# Patient Record
Sex: FEMALE | NOT HISPANIC OR LATINO | Employment: UNEMPLOYED | URBAN - METROPOLITAN AREA
[De-identification: names, ages, dates, MRNs, and addresses within clinical notes are randomized per-mention and may not be internally consistent; named-entity substitution may affect disease eponyms.]

---

## 2018-01-18 NOTE — PROGRESS NOTES
Assessment    1  Injury of upper extremity, left, initial encounter (959 2) (S49 92XA)   2  Fracture, humerus, head (812 09) (S42 293A)    Plan  Fracture, humerus, head    · Hydrocodone-Acetaminophen 7 5-325 MG Oral Tablet; TAKE 1 TABLET 3 times  daily PRN   · *1 - [facility] (ORTHOPEDIC SURGERY ) Physician  Referral  Consult  Status: Active  Requested for: [id number]  Care Summary provided  : Yes  Injury of upper extremity, left, initial encounter    · * XR SHOULDER 2+ VIEW LEFT; Status:Active; Requested for:18Mar2016;     Discussion/Summary  Discussion Summary:   Patient has fracture of head of humerus  Patient was placed in a long-arm splint and a sling  Advised to use Aleve for pain control and hydrocodone as backup  Follow-up with orthopedics on Monday  Information given  If any worsening of the pain or swelling or any numbness tingling or weakness of the hand, advised to go to ER without any delay  Medication Side Effects Reviewed: Possible side effects of new medications were reviewed with the patient/guardian today  Understands and agrees with treatment plan: The treatment plan was reviewed with the patient/guardian  The patient/guardian understands and agrees with the treatment plan      Chief Complaint    1  Arm Pain  Chief Complaint Free Text Note Form: Gale Shin around 3 pm while skying landed on left upper arm iced it and was seen by  not getting better un able to do ROM with arm states her upper lateral side of her arm is hurting  no open areas or ecchymosis noted at present time  History of Present Illness  HPI: Agree with nurse's note  Now patient is complaining of pain and inability to move her left shoulder  Patient complaining of pain localized in left lateral shoulder  Denies any distal numbness tingling or weakness of the hand or elbow  Denies any significant injury to any other part of the body  Denies any head injury or loss of consciousness     Hospital Based Practices Required Assessment:   Pain Assessment   the patient states they have pain  (on a scale of 0 to 10, the patient rates the pain at 9 )   Abuse And Domestic Violence Screen    Yes, the patient is safe at home  The patient states no one is hurting them  Depression And Suicide Screen  No, the patient has not had thoughts of hurting themself  No, the patient has not felt depressed in the past 7 days  Prefered Language is  [place]  Primary Language is  English  Review of Systems  Focused-Female:   Constitutional: No fever, no chills, feels well, no tiredness, no recent weight gain or loss  Social History    · Never smoker   · No alcohol use   · No drug use    Current Meds   1  No Reported Medications Recorded    Allergies    1  No Known Drug Allergies    Vitals  Signs [Data Includes: Current Encounter]   Recorded: [id number] 09:37PM   Temperature: 99 1 F  Heart Rate: 71  Respiration: 18  Systolic: 843  Diastolic: 57  O2 Saturation: 100  Pain Scale: 9    Physical Exam    Constitutional   General appearance: No acute distress, well appearing and well nourished  Musculoskeletal Moderate to severe restriction of activity range of motion in all planes especially lifting arm above the shoulder level  Mild-to-moderate swelling and moderate to severe tenderness to palpation of the lateral deltoid area  Also moderate tenderness to palpation in anterior joint line  Handgrip 5 out of 5  Full range of motion noticed in the wrist and elbow on the left side  Neurovascularly intact distally        Signatures   Electronically signed by : KAYLA Prado ; Mar 23 2016  4:39PM EST                       (Author)

## 2019-12-16 RX ORDER — TOLTERODINE 2 MG/1
2 CAPSULE, EXTENDED RELEASE ORAL
COMMUNITY
Start: 2018-01-08 | End: 2019-12-17 | Stop reason: ALTCHOICE

## 2019-12-16 RX ORDER — HYDROCODONE BITARTRATE AND ACETAMINOPHEN 7.5; 325 MG/1; MG/1
1 TABLET ORAL 3 TIMES DAILY
COMMUNITY
Start: 2016-03-18 | End: 2019-12-17 | Stop reason: ALTCHOICE

## 2019-12-17 ENCOUNTER — OFFICE VISIT (OUTPATIENT)
Dept: FAMILY MEDICINE CLINIC | Facility: CLINIC | Age: 59
End: 2019-12-17
Payer: COMMERCIAL

## 2019-12-17 VITALS
HEIGHT: 61 IN | HEART RATE: 68 BPM | WEIGHT: 147.4 LBS | SYSTOLIC BLOOD PRESSURE: 98 MMHG | DIASTOLIC BLOOD PRESSURE: 62 MMHG | BODY MASS INDEX: 27.83 KG/M2 | OXYGEN SATURATION: 96 % | TEMPERATURE: 97.7 F

## 2019-12-17 DIAGNOSIS — Z12.11 SCREENING FOR COLON CANCER: ICD-10-CM

## 2019-12-17 DIAGNOSIS — Z01.818 PRE-OPERATIVE GENERAL PHYSICAL EXAMINATION: Primary | ICD-10-CM

## 2019-12-17 DIAGNOSIS — H35.372 EPIRETINAL MEMBRANE (ERM) OF LEFT EYE: ICD-10-CM

## 2019-12-17 DIAGNOSIS — Z13.6 SCREENING FOR CARDIOVASCULAR CONDITION: ICD-10-CM

## 2019-12-17 DIAGNOSIS — Z12.11 SCREENING FOR COLORECTAL CANCER: ICD-10-CM

## 2019-12-17 DIAGNOSIS — Z11.4 SCREENING FOR HIV (HUMAN IMMUNODEFICIENCY VIRUS): ICD-10-CM

## 2019-12-17 DIAGNOSIS — Z12.12 SCREENING FOR COLORECTAL CANCER: ICD-10-CM

## 2019-12-17 DIAGNOSIS — Z13.29 SCREENING FOR THYROID DISORDER: ICD-10-CM

## 2019-12-17 PROCEDURE — 99243 OFF/OP CNSLTJ NEW/EST LOW 30: CPT | Performed by: FAMILY MEDICINE

## 2019-12-17 RX ORDER — NEOMYCIN SULFATE, POLYMYXIN B SULFATE AND DEXAMETHASONE 3.5; 10000; 1 MG/ML; [USP'U]/ML; MG/ML
SUSPENSION/ DROPS OPHTHALMIC
Refills: 0 | COMMUNITY
Start: 2019-09-11 | End: 2019-12-17 | Stop reason: ALTCHOICE

## 2019-12-17 NOTE — PROGRESS NOTES
[facility] PRE-OPERATIVE EVALUATION  [facility]  [facility]    NAME: Nadia Mata  AGE: 62 y o  SEX: female  : 1960     DATE: 2019    Family Practice Pre-Operative Evaluation      Chief Complaint: Pre-operative Evaluation     Surgery: Vitrectomy, membrane peeling  Anticipated Date of Surgery: 2020  Referring Provider: Orman Blizzard, MD       History of Present Illness:     Emili Hall is a 62 y o  female who presents to the office today for a preoperative consultation at the request of surgeon, Dr Michelle Tejada, who plans on performing above surgery  Planned anesthesia is local and retrobulbar block  Patient has a bleeding risk of: no recent abnormal bleeding  Patient does not have objections to receiving blood products if needed  Current anti-platelet/anti-coagulation medications that the patient is prescribed includes: none  Assessment of Chronic Conditions:   - no chronic medical conditions     Assessment of Cardiac Risk:  · Denies unstable or severe angina or MI in the last 6 weeks or history of stent placement in the last year   · Denies decompensated heart failure (e g  New onset heart failure, NYHA functional class IV heart failure, or worsening existing heart failure)  · Denies significant arrhythmias such as high grade AV block, symptomatic ventricular arrhythmia, newly recognized ventricular tachycardia, supraventricular tachycardia with resting heart rate >100, or symptomatic bradycardia  · Denies severe heart valve disease including aortic stenosis or symptomatic mitral stenosis     Exercise Capacity:  · Able to walk 4 blocks without symptoms?: Yes  · Able to walk 2 flights without symptoms?: Yes    Prior Anesthesia Reactions: No     Personal history of venous thromboembolic disease?  Yes, over 30 years ago, fractured leg, developed DVT while in cast; treated for approximately 6 months at that time; no issues since    History of steroid use for >2 weeks within last year? No         Review of Systems:     Review of Systems   Constitutional: Negative  HENT: Negative  Eyes: Positive for visual disturbance  Respiratory: Negative  Cardiovascular: Negative  Gastrointestinal: Negative  Genitourinary: Negative  Musculoskeletal: Negative  Skin: Negative  Neurological: Negative  Psychiatric/Behavioral: Negative  Current Problem List:     There is no problem list on file for this patient  Allergies:     No Known Allergies    Current Medications:     No current outpatient medications on file  Past Medical History:       History reviewed  No pertinent past medical history       Past Surgical History:   Procedure Laterality Date    FINGER SURGERY      KNEE ARTHROSCOPY      WISDOM TOOTH EXTRACTION          Family History   Problem Relation Age of Onset    Osteoporosis Mother         Social History     Socioeconomic History    Marital status: /Civil Union     Spouse name: Not on file    Number of children: Not on file    Years of education: Not on file    Highest education level: Not on file   Occupational History    Not on file   Social Needs    Financial resource strain: Not on file    Food insecurity:     Worry: Not on file     Inability: Not on file    Transportation needs:     Medical: Not on file     Non-medical: Not on file   Tobacco Use    Smoking status: Never Smoker    Smokeless tobacco: Never Used   Substance and Sexual Activity    Alcohol use: Yes     Comment: socially    Drug use: Never     Comment: No drug use - As per [employer]     Sexual activity: Not on file   Lifestyle    Physical activity:     Days per week: Not on file     Minutes per session: Not on file    Stress: Not on file   Relationships    Social connections:     Talks on phone: Not on file     Gets together: Not on file     Attends Anabaptism service: Not on file     Active member of club or organization: Not on file     Attends meetings of clubs or organizations: Not on file     Relationship status: Not on file    Intimate partner violence:     Fear of current or ex partner: Not on file     Emotionally abused: Not on file     Physically abused: Not on file     Forced sexual activity: Not on file   Other Topics Concern    Not on file   Social History Narrative    Not on file        Physical Exam:     BP 98/62 (BP Location: Left arm, Patient Position: Sitting, Cuff Size: Adult)   Pulse 68   Temp 97 7 °F (36 5 °C) (Tympanic)   Ht 5' 1 42" (1 56 m)   Wt 66 9 kg (147 lb 6 4 oz)   SpO2 96%   BMI 27 47 kg/m²     Physical Exam   Constitutional: She is oriented to person, place, and time  She appears well-developed and well-nourished  HENT:   Head: Normocephalic and atraumatic  Right Ear: External ear normal    Left Ear: External ear normal    Mouth/Throat: Oropharynx is clear and moist    Eyes: Pupils are equal, round, and reactive to light  Conjunctivae and EOM are normal    Neck: Normal range of motion  Neck supple  No thyromegaly present  No carotid bruits   Cardiovascular: Normal rate, regular rhythm and normal heart sounds  No murmur heard  Pulmonary/Chest: Effort normal and breath sounds normal  No respiratory distress  Abdominal: Soft  Bowel sounds are normal  She exhibits no distension  There is no tenderness  Musculoskeletal: Normal range of motion  Neurological: She is alert and oriented to person, place, and time  No cranial nerve deficit  Skin: Skin is warm  Capillary refill takes less than 2 seconds  Psychiatric: She has a normal mood and affect  Nursing note and vitals reviewed         Data:     Pre-operative work-up    Laboratory Results: n/a   No results found for: WBC, HGB, HCT, PLT, CHOL, TRIG, HDL, LDLDIRECT, ALT, AST, NA, K, CL, CREATININE, BUN, CO2, TSH, PSA, INR, GLUF, HGBA1C, MICROALBUR  EKG: n/a    Chest x-ray: n/a      Previous cardiopulmonary studies within the past year:  · Echocardiogram: no  · Cardiac Catheterization: no  · Stress Test: no  · Pulmonary Function Testing: no      Assessment & Recommendations:     1  Pre-operative general physical examination  CBC and differential    Comprehensive metabolic panel    Lipid panel    TSH, 3rd generation with Free T4 reflex    Vitamin D 25 hydroxy   2  Epiretinal membrane (ERM) of left eye     3  Screening for HIV (human immunodeficiency virus)  Human Immunodeficiency Virus 1/2 Antigen / Antibody ( Fourth Generation) with Reflex Testing   4  Screening for cardiovascular condition  CBC and differential    Comprehensive metabolic panel    Lipid panel    TSH, 3rd generation with Free T4 reflex    Vitamin D 25 hydroxy   5  Screening for thyroid disorder  CBC and differential    Comprehensive metabolic panel    Lipid panel    TSH, 3rd generation with Free T4 reflex    Vitamin D 25 hydroxy   6  Screening for colorectal cancer  Cologuard   7  Screening for colon cancer         Pre-Op Evaluation Assessment  62 y o  female with planned surgery:    Known risk factors for perioperative complications: None  Current medications which may produce withdrawal symptoms if withheld perioperatively: none    Pre-Op Evaluation Plan  1  Further preoperative workup as follows:   - None; no further preoperative work-up is required    2  Medication Management/Recommendations:   - None, continue medication regimen including morning of surgery, with sip of water    3  Prophylaxis for cardiac events with perioperative beta-blockers: not indicated  4  Patient requires further consultation with: None    Clearance  Pt is low medical risk for surgery       Rachel Morton MD  [address] [facility]  [address]  [address] [facility] [address]  Phone#  [phone]  Fax#  [phone]

## 2019-12-17 NOTE — PATIENT INSTRUCTIONS
Good luck with surgery! It was a pleasure meeting you today  I am giving you labs to get done prior to physical   Please schedule physical in 2 months after your surgery  You are due for colon cancer screening  I am giving you an order for the Cologuard  Please check with insurance that it is covered  Have a happy and healthy holiday

## 2020-05-22 ENCOUNTER — TELEPHONE (OUTPATIENT)
Dept: ADMINISTRATIVE | Facility: OTHER | Age: 60
End: 2020-05-22

## 2020-05-26 ENCOUNTER — OFFICE VISIT (OUTPATIENT)
Dept: FAMILY MEDICINE CLINIC | Facility: CLINIC | Age: 60
End: 2020-05-26
Payer: COMMERCIAL

## 2020-05-26 VITALS
BODY MASS INDEX: 26.4 KG/M2 | HEART RATE: 62 BPM | HEIGHT: 63 IN | DIASTOLIC BLOOD PRESSURE: 68 MMHG | WEIGHT: 149 LBS | RESPIRATION RATE: 16 BRPM | SYSTOLIC BLOOD PRESSURE: 102 MMHG | OXYGEN SATURATION: 98 % | TEMPERATURE: 98.2 F

## 2020-05-26 DIAGNOSIS — R79.89 ELEVATED LFTS: ICD-10-CM

## 2020-05-26 DIAGNOSIS — Z11.59 NEED FOR HEPATITIS C SCREENING TEST: ICD-10-CM

## 2020-05-26 DIAGNOSIS — R21 RASH: ICD-10-CM

## 2020-05-26 DIAGNOSIS — Z12.11 SCREENING FOR COLORECTAL CANCER: ICD-10-CM

## 2020-05-26 DIAGNOSIS — D56.1 BETA-THALASSEMIA (HCC): ICD-10-CM

## 2020-05-26 DIAGNOSIS — Z00.00 ANNUAL PHYSICAL EXAM: Primary | ICD-10-CM

## 2020-05-26 DIAGNOSIS — Z12.12 SCREENING FOR COLORECTAL CANCER: ICD-10-CM

## 2020-05-26 DIAGNOSIS — E55.9 VITAMIN D DEFICIENCY: ICD-10-CM

## 2020-05-26 PROCEDURE — 1036F TOBACCO NON-USER: CPT | Performed by: FAMILY MEDICINE

## 2020-05-26 PROCEDURE — 3008F BODY MASS INDEX DOCD: CPT | Performed by: FAMILY MEDICINE

## 2020-05-26 PROCEDURE — 99396 PREV VISIT EST AGE 40-64: CPT | Performed by: FAMILY MEDICINE

## 2020-05-26 PROCEDURE — 99213 OFFICE O/P EST LOW 20 MIN: CPT | Performed by: FAMILY MEDICINE

## 2020-05-26 RX ORDER — ERGOCALCIFEROL 1.25 MG/1
CAPSULE ORAL
Qty: 12 CAPSULE | Refills: 0 | Status: SHIPPED | OUTPATIENT
Start: 2020-05-26 | End: 2022-06-09 | Stop reason: ALTCHOICE

## 2020-07-14 ENCOUNTER — OFFICE VISIT (OUTPATIENT)
Dept: FAMILY MEDICINE CLINIC | Facility: CLINIC | Age: 60
End: 2020-07-14
Payer: COMMERCIAL

## 2020-07-14 VITALS
WEIGHT: 146.7 LBS | BODY MASS INDEX: 25.99 KG/M2 | HEIGHT: 63 IN | TEMPERATURE: 97.5 F | DIASTOLIC BLOOD PRESSURE: 72 MMHG | OXYGEN SATURATION: 98 % | HEART RATE: 58 BPM | SYSTOLIC BLOOD PRESSURE: 108 MMHG

## 2020-07-14 DIAGNOSIS — Z01.818 PRE-OPERATIVE GENERAL PHYSICAL EXAMINATION: ICD-10-CM

## 2020-07-14 DIAGNOSIS — H25.9 SENILE CATARACT OF LEFT EYE, UNSPECIFIED AGE-RELATED CATARACT TYPE: Primary | ICD-10-CM

## 2020-07-14 PROCEDURE — 99213 OFFICE O/P EST LOW 20 MIN: CPT | Performed by: FAMILY MEDICINE

## 2020-07-14 PROCEDURE — 1036F TOBACCO NON-USER: CPT | Performed by: FAMILY MEDICINE

## 2020-07-14 PROCEDURE — 3008F BODY MASS INDEX DOCD: CPT | Performed by: FAMILY MEDICINE

## 2020-07-14 NOTE — PROGRESS NOTES
[facility] PRACTICE PRE-OPERATIVE EVALUATION  [facility] - [facility]    NAME: Nadia Mata  AGE: 62 y o  SEX: female  : 1960     DATE: 2020    Family Practice Pre-Operative Evaluation      Chief Complaint: Pre-operative Evaluation     Surgery: cataract surgery left eye  Anticipated Date of Surgery: 2020  Referring Provider: Manoj Pickens MD       History of Present Illness:     Lisbet Damon is a 62 y o  female who presents to the office today for a preoperative consultation at the request of surgeon, Dr Kristy Hamilton, who plans on performing cataract syrgery on 2020   Planned anesthesia is local and IV sedation  Patient has a bleeding risk of: no recent abnormal bleeding and no remote history of abnormal bleeding  Patient does not have objections to receiving blood products if needed  Current anti-platelet/anti-coagulation medications that the patient is prescribed includes: none  Assessment of Chronic Conditions:   - no significant medical history     Assessment of Cardiac Risk:  · Denies unstable or severe angina or MI in the last 6 weeks or history of stent placement in the last year   · Denies decompensated heart failure (e g  New onset heart failure, NYHA functional class IV heart failure, or worsening existing heart failure)  · Denies significant arrhythmias such as high grade AV block, symptomatic ventricular arrhythmia, newly recognized ventricular tachycardia, supraventricular tachycardia with resting heart rate >100, or symptomatic bradycardia  · Denies severe heart valve disease including aortic stenosis or symptomatic mitral stenosis     Exercise Capacity:  · Able to walk 4 blocks without symptoms?: Yes  · Able to walk 2 flights without symptoms?: Yes    Prior Anesthesia Reactions: No     Personal history of venous thromboembolic disease?  Yes, DVT approx 30 years ago while leg casted, anticoagulated for 6 months; no issues since then    History of steroid use for >2 weeks within last year? No         Review of Systems:     Review of Systems   Constitutional: Negative  HENT: Negative  Eyes: Positive for visual disturbance  Respiratory: Negative  Cardiovascular: Negative  Gastrointestinal: Negative  Genitourinary: Negative  Musculoskeletal: Negative  Neurological: Negative  Psychiatric/Behavioral: Negative  Current Problem List:     Patient Active Problem List   Diagnosis    Beta-thalassemia ([facility] [address] )       Allergies:     No Known Allergies    Current Medications:       Current Outpatient Medications:     ergocalciferol (VITAMIN D2) 50,000 units, Take 1 tablet once or twice weekly until finished, Disp: 12 capsule, Rfl: 0    Past Medical History:       History reviewed  No pertinent past medical history       Past Surgical History:   Procedure Laterality Date    EYE SURGERY      FINGER SURGERY      KNEE ARTHROSCOPY      WISDOM TOOTH EXTRACTION          Family History   Problem Relation Age of Onset    Osteoporosis Mother         Social History     Socioeconomic History    Marital status: /Civil Union     Spouse name: Marie Torres Number of children: 2    Years of education: masters    Highest education level: Master's degree (e g , MA, MS, Reed, MEd, MSW, NATHALY)   Occupational History    Occupation:      Employer: [employer] resource strain: Not hard at all   [address] insecurity:     Worry: Never true     Inability: Never true    Transportation needs:     Medical: No     Non-medical: No   Tobacco Use    Smoking status: Never Smoker    Smokeless tobacco: Never Used   Substance and Sexual Activity    Alcohol use: Yes     Frequency: Monthly or less     Comment: socially    Drug use: Never     Comment: No drug use - As per [employer]     Sexual activity: Yes     Partners: Male   Lifestyle    Physical activity:     Days per week: 4 days     Minutes per session: Not on file    Stress: Not at all   Relationships    Social connections:     Talks on phone: Not on file     Gets together: Not on file     Attends Sikhism service: Never     Active member of club or organization: Yes     Attends meetings of clubs or organizations: Not on file     Relationship status:     Intimate partner violence:     Fear of current or ex partner: No     Emotionally abused: No     Physically abused: No     Forced sexual activity: No   Other Topics Concern    Not on file   Social History Narrative    Not on file        Physical Exam:     /72   Pulse 58   Temp 97 5 °F (36 4 °C) (Tympanic)   Ht 5' 2 76" (1 594 m)   Wt 66 5 kg (146 lb 11 2 oz)   SpO2 98%   BMI 26 19 kg/m²     Physical Exam   Constitutional: She is oriented to person, place, and time  She appears well-developed and well-nourished  HENT:   Head: Normocephalic and atraumatic  Right Ear: External ear normal    Left Ear: External ear normal    Mouth/Throat: Oropharynx is clear and moist    Eyes: Pupils are equal, round, and reactive to light  Conjunctivae and EOM are normal    Neck: Normal range of motion  Neck supple  No thyromegaly present  No carotid bruits   Cardiovascular: Normal rate, regular rhythm and normal heart sounds  No murmur heard  Pulmonary/Chest: Effort normal and breath sounds normal  No respiratory distress  Abdominal: Soft  Bowel sounds are normal  She exhibits no distension  There is no tenderness  Musculoskeletal: Normal range of motion  Neurological: She is alert and oriented to person, place, and time  No cranial nerve deficit  Skin: Skin is warm  Capillary refill takes less than 2 seconds  Psychiatric: She has a normal mood and affect  Nursing note and vitals reviewed         Data:     Pre-operative work-up    Laboratory Results: I have personally reviewed the pertinent laboratory results/reports      EKG: N/A    Chest x-ray: N/A      Previous cardiopulmonary studies within the past year:  · Echocardiogram: N/A  · Cardiac Catheterization: N/A  · Stress Test: N/A  · Pulmonary Function Testing: N/A      Assessment & Recommendations:     1  Senile cataract of left eye, unspecified age-related cataract type     2  Pre-operative general physical examination         Pre-Op Evaluation Assessment  62 y o  female with planned surgery:    Known risk factors for perioperative complications: None  Current medications which may produce withdrawal symptoms if withheld perioperatively: none  Pre-Op Evaluation Plan  1  Further preoperative workup as follows:   - None; no further preoperative work-up is required    2  Medication Management/Recommendations:   - Not applicable, not on any medications    3  Prophylaxis for cardiac events with perioperative beta-blockers: not indicated or as per surgical team    4   Patient requires further consultation with: None    Clearance  Pt is low risk for surgery        Adrianne Quinn MD  [facility]  [address] [place] [address]  Phone#  [phone]  Fax#  [phone]

## 2020-09-26 ENCOUNTER — PREP FOR PROCEDURE (OUTPATIENT)
Dept: GASTROENTEROLOGY | Facility: MEDICAL CENTER | Age: 60
End: 2020-09-26

## 2020-09-26 DIAGNOSIS — Z12.11 COLON CANCER SCREENING: Primary | ICD-10-CM

## 2020-11-06 ENCOUNTER — TELEPHONE (OUTPATIENT)
Dept: GASTROENTEROLOGY | Facility: CLINIC | Age: 60
End: 2020-11-06

## 2020-11-10 ENCOUNTER — ANESTHESIA EVENT (OUTPATIENT)
Dept: GASTROENTEROLOGY | Facility: MEDICAL CENTER | Age: 60
End: 2020-11-10

## 2020-11-11 ENCOUNTER — HOSPITAL ENCOUNTER (OUTPATIENT)
Dept: GASTROENTEROLOGY | Facility: MEDICAL CENTER | Age: 60
Setting detail: OUTPATIENT SURGERY
Discharge: HOME | End: 2020-11-11
Attending: INTERNAL MEDICINE
Payer: COMMERCIAL

## 2020-11-11 ENCOUNTER — ANESTHESIA (OUTPATIENT)
Dept: GASTROENTEROLOGY | Facility: MEDICAL CENTER | Age: 60
End: 2020-11-11

## 2020-11-11 VITALS
HEART RATE: 57 BPM | RESPIRATION RATE: 16 BRPM | TEMPERATURE: 97.5 F | OXYGEN SATURATION: 97 % | DIASTOLIC BLOOD PRESSURE: 52 MMHG | SYSTOLIC BLOOD PRESSURE: 84 MMHG

## 2020-11-11 VITALS — HEART RATE: 59 BPM

## 2020-11-11 DIAGNOSIS — Z12.11 COLON CANCER SCREENING: ICD-10-CM

## 2020-11-11 PROCEDURE — G0121 COLON CA SCRN NOT HI RSK IND: HCPCS | Performed by: INTERNAL MEDICINE

## 2020-11-11 RX ORDER — SODIUM CHLORIDE 9 MG/ML
125 INJECTION, SOLUTION INTRAVENOUS CONTINUOUS
Status: DISCONTINUED | OUTPATIENT
Start: 2020-11-11 | End: 2020-11-15 | Stop reason: HOSPADM

## 2020-11-11 RX ORDER — PROPOFOL 10 MG/ML
INJECTION, EMULSION INTRAVENOUS AS NEEDED
Status: DISCONTINUED | OUTPATIENT
Start: 2020-11-11 | End: 2020-11-11

## 2020-11-11 RX ADMIN — SODIUM CHLORIDE: 0.9 INJECTION, SOLUTION INTRAVENOUS at 08:18

## 2020-11-11 RX ADMIN — PROPOFOL 50 MG: 10 INJECTION, EMULSION INTRAVENOUS at 08:37

## 2020-11-11 RX ADMIN — PROPOFOL 100 MG: 10 INJECTION, EMULSION INTRAVENOUS at 08:33

## 2020-11-11 RX ADMIN — PROPOFOL 50 MG: 10 INJECTION, EMULSION INTRAVENOUS at 08:42

## 2021-06-01 ENCOUNTER — OFFICE VISIT (OUTPATIENT)
Dept: FAMILY MEDICINE CLINIC | Facility: CLINIC | Age: 61
End: 2021-06-01
Payer: COMMERCIAL

## 2021-06-01 VITALS
HEART RATE: 60 BPM | BODY MASS INDEX: 25.56 KG/M2 | DIASTOLIC BLOOD PRESSURE: 60 MMHG | HEIGHT: 62 IN | TEMPERATURE: 97.6 F | SYSTOLIC BLOOD PRESSURE: 90 MMHG | WEIGHT: 138.9 LBS | OXYGEN SATURATION: 98 %

## 2021-06-01 DIAGNOSIS — Z13.29 SCREENING FOR THYROID DISORDER: ICD-10-CM

## 2021-06-01 DIAGNOSIS — E55.9 VITAMIN D DEFICIENCY: ICD-10-CM

## 2021-06-01 DIAGNOSIS — R79.89 ELEVATED LFTS: ICD-10-CM

## 2021-06-01 DIAGNOSIS — H43.10 DIABETIC VITREOUS HEMORRHAGE (HCC): ICD-10-CM

## 2021-06-01 DIAGNOSIS — Z00.00 ANNUAL PHYSICAL EXAM: Primary | ICD-10-CM

## 2021-06-01 DIAGNOSIS — Z12.31 ENCOUNTER FOR SCREENING MAMMOGRAM FOR MALIGNANT NEOPLASM OF BREAST: ICD-10-CM

## 2021-06-01 DIAGNOSIS — E11.39 DIABETIC VITREOUS HEMORRHAGE (HCC): ICD-10-CM

## 2021-06-01 DIAGNOSIS — Z23 ENCOUNTER FOR IMMUNIZATION: ICD-10-CM

## 2021-06-01 DIAGNOSIS — Z13.6 SCREENING FOR CARDIOVASCULAR CONDITION: ICD-10-CM

## 2021-06-01 DIAGNOSIS — D56.1 BETA-THALASSEMIA (HCC): ICD-10-CM

## 2021-06-01 DIAGNOSIS — M79.645 PAIN OF LEFT THUMB: ICD-10-CM

## 2021-06-01 PROBLEM — H25.13 AGE-RELATED NUCLEAR CATARACT OF BOTH EYES: Status: ACTIVE | Noted: 2019-09-11

## 2021-06-01 PROBLEM — H43.812 VITREOUS DETACHMENT OF LEFT EYE: Status: ACTIVE | Noted: 2019-09-11

## 2021-06-01 PROBLEM — H04.123 DRY EYES: Status: ACTIVE | Noted: 2020-07-13

## 2021-06-01 PROBLEM — H43.392 VITREOUS OPACITIES OF LEFT EYE: Status: ACTIVE | Noted: 2021-03-05

## 2021-06-01 PROBLEM — H16.229 KERATOCONJUNCTIVITIS SICCA NOT DUE TO SJOGREN'S SYNDROME: Status: ACTIVE | Noted: 2020-07-13

## 2021-06-01 PROBLEM — H26.40 SECONDARY CATARACT OF LEFT EYE: Status: ACTIVE | Noted: 2021-03-05

## 2021-06-01 PROBLEM — H33.019: Status: ACTIVE | Noted: 2019-09-11

## 2021-06-01 PROBLEM — H33.309 TEAR OF RETINA WITHOUT DETACHMENT: Status: ACTIVE | Noted: 2020-07-13

## 2021-06-01 PROBLEM — H33.312 HORSESHOE TEAR OF RETINA OF LEFT EYE WITHOUT DETACHMENT: Status: ACTIVE | Noted: 2020-05-27

## 2021-06-01 PROCEDURE — 1036F TOBACCO NON-USER: CPT | Performed by: FAMILY MEDICINE

## 2021-06-01 PROCEDURE — 3008F BODY MASS INDEX DOCD: CPT | Performed by: FAMILY MEDICINE

## 2021-06-01 PROCEDURE — 90715 TDAP VACCINE 7 YRS/> IM: CPT

## 2021-06-01 PROCEDURE — 99396 PREV VISIT EST AGE 40-64: CPT | Performed by: FAMILY MEDICINE

## 2021-06-01 PROCEDURE — 3725F SCREEN DEPRESSION PERFORMED: CPT | Performed by: FAMILY MEDICINE

## 2021-06-01 PROCEDURE — 90471 IMMUNIZATION ADMIN: CPT

## 2021-06-01 RX ORDER — DIPHENHYDRAMINE HCL 25 MG
1 CAPSULE ORAL
COMMUNITY

## 2021-06-01 RX ORDER — CLOBETASOL PROPIONATE 0.46 MG/ML
SOLUTION TOPICAL
COMMUNITY

## 2021-06-01 NOTE — PATIENT INSTRUCTIONS
Please get your labwork done fasting  Do not eat/drink for about 8-12 hours prior to getting the labwork done, however you may drink water or black coffee while you are fasting  Please use a [facility] lab if possible, as we receive the lab results more quickly  We will notify you of your labwork results even if they are normal   Please contact us if you do not hear about your lab results after one week  Low Carb Recommendations:  Please follow a low carbohydrate, high protein diet  [employer] is a good mick/computer program to keep food logs  Your meals should be less than 30 grams of carbohydrates  Your snacks should be less than 15 grams of carbohydrates  You should drink at least 64 ounces of water daily  You should walk at least 30 minutes daily  I am going to get records from dermatology to review  Please call for any questions or concerns  Yearly well exams are recommended  Wellness Visit for Adults   AMBULATORY CARE:   A wellness visit  is when you see your healthcare provider to get screened for health problems  Your healthcare provider will also give you advice on how to stay healthy  Write down your questions so you remember to ask them  Ask your healthcare provider how often you should have a wellness visit  What happens at a wellness visit:  Your healthcare provider will ask about your health, and your family history of health problems  This includes high blood pressure, heart disease, and cancer  He or she will ask if you have symptoms that concern you, if you smoke, and about your mood  You may also be asked about your intake of medicines, supplements, food, and alcohol  Any of the following may be done:  · Your weight  will be checked  Your height may also be checked so your body mass index (BMI) can be calculated  Your BMI shows if you are at a healthy weight  · Your blood pressure  and heart rate will be checked  Your temperature may also be checked      · Blood and urine tests  may be done  Blood tests may be done to check your cholesterol levels  Abnormal cholesterol levels increase your risk for heart disease and stroke  You may also need a blood or urine test to check for diabetes if you are at increased risk  Urine tests may be done to look for signs of an infection or kidney disease  · A physical exam  includes checking your heartbeat and lungs with a stethoscope  Your healthcare provider may also check your skin to look for sun damage  · Screening tests  may be recommended  A screening test is done to check for diseases that may not cause symptoms  The screening tests you may need depend on your age, gender, family history, and lifestyle habits  For example, colorectal screening may be recommended if you are 48years old or older  Screening tests you need if you are a woman:   · A Pap smear  is used to screen for cervical cancer  Pap smears are usually done every 3 to 5 years depending on your age  You may need them more often if you have had abnormal Pap smear test results in the past  Ask your healthcare provider how often you should have a Pap smear  · A mammogram  is an x-ray of your breasts to screen for breast cancer  Experts recommend mammograms every 2 years starting at age 48 years  You may need a mammogram at age 52 years or younger if you have an increased risk for breast cancer  Talk to your healthcare provider about when you should start having mammograms and how often you need them  Vaccines you may need:   · Get an influenza vaccine  every year  The influenza vaccine protects you from the flu  Several types of viruses cause the flu  The viruses change over time, so new vaccines are made each year  · Get a tetanus-diphtheria (Td) booster vaccine  every 10 years  This vaccine protects you against tetanus and diphtheria  Tetanus is a severe infection that may cause painful muscle spasms and lockjaw   Diphtheria is a severe bacterial infection that causes a thick covering in the back of your mouth and throat  · Get a human papillomavirus (HPV) vaccine  if you are female and aged 23 to 32 or male 23 to 24 and never received it  This vaccine protects you from HPV infection  HPV is the most common infection spread by sexual contact  HPV may also cause vaginal, penile, and anal cancers  · Get a pneumococcal vaccine  if you are aged 72 years or older  The pneumococcal vaccine is an injection given to protect you from pneumococcal disease  Pneumococcal disease is an infection caused by pneumococcal bacteria  The infection may cause pneumonia, meningitis, or an ear infection  · Get a shingles vaccine  if you are 60 or older, even if you have had shingles before  The shingles vaccine is an injection to protect you from the varicella-zoster virus  This is the same virus that causes chickenpox  Shingles is a painful rash that develops in people who had chickenpox or have been exposed to the virus  How to eat healthy:  My Plate is a model for planning healthy meals  It shows the types and amounts of foods that should go on your plate  Fruits and vegetables make up about half of your plate, and grains and protein make up the other half  A serving of dairy is included on the side of your plate  The amount of calories and serving sizes you need depends on your age, gender, weight, and height  Examples of healthy foods are listed below:  · Eat a variety of vegetables  such as dark green, red, and orange vegetables  You can also include canned vegetables low in sodium (salt) and frozen vegetables without added butter or sauces  · Eat a variety of fresh fruits , canned fruit in 100% juice, frozen fruit, and dried fruit  · Include whole grains  At least half of the grains you eat should be whole grains   Examples include whole-wheat bread, wheat pasta, brown rice, and whole-grain cereals such as oatmeal     · Eat a variety of protein foods such as seafood (fish and shellfish), lean meat, and poultry without skin (turkey and chicken)  Examples of lean meats include pork leg, shoulder, or tenderloin, and beef round, sirloin, tenderloin, and extra lean ground beef  Other protein foods include eggs and egg substitutes, beans, peas, soy products, nuts, and seeds  · Choose low-fat dairy products such as skim or 1% milk or low-fat yogurt, cheese, and cottage cheese  · Limit unhealthy fats  such as butter, hard margarine, and shortening  Exercise:  Exercise at least 30 minutes per day on most days of the week  Some examples of exercise include walking, biking, dancing, and swimming  You can also fit in more physical activity by taking the stairs instead of the elevator or parking farther away from stores  Include muscle strengthening activities 2 days each week  Regular exercise provides many health benefits  It helps you manage your weight, and decreases your risk for type 2 diabetes, heart disease, stroke, and high blood pressure  Exercise can also help improve your mood  Ask your healthcare provider about the best exercise plan for you  General health and safety guidelines:   · Do not smoke  Nicotine and other chemicals in cigarettes and cigars can cause lung damage  Ask your healthcare provider for information if you currently smoke and need help to quit  E-cigarettes or smokeless tobacco still contain nicotine  Talk to your healthcare provider before you use these products  · Limit alcohol  A drink of alcohol is 12 ounces of beer, 5 ounces of wine, or 1½ ounces of liquor  · Lose weight, if needed  Being overweight increases your risk of certain health conditions  These include heart disease, high blood pressure, type 2 diabetes, and certain types of cancer  · Protect your skin  Do not sunbathe or use tanning beds  Use sunscreen with a SPF 15 or higher  Apply sunscreen at least 15 minutes before you go outside   Reapply sunscreen every 2 hours  Wear protective clothing, hats, and sunglasses when you are outside  · Drive safely  Always wear your seatbelt  Make sure everyone in your car wears a seatbelt  A seatbelt can save your life if you are in an accident  Do not use your cell phone when you are driving  This could distract you and cause an accident  Pull over if you need to make a call or send a text message  · Practice safe sex  Use latex condoms if are sexually active and have more than one partner  Your healthcare provider may recommend screening tests for sexually transmitted infections (STIs)  · Wear helmets, lifejackets, and protective gear  Always wear a helmet when you ride a bike or motorcycle, go skiing, or play sports that could cause a head injury  Wear protective equipment when you play sports  Wear a lifejacket when you are on a boat or doing water sports  © Copyright [address] Information is for End User's use only and may not be sold, redistributed or otherwise used for commercial purposes  All illustrations and images included in CareNotes® are the copyrighted property of [employer] D [employer] M [employer]  or [address] [place] [address]  The above information is an  only  It is not intended as medical advice for individual conditions or treatments  Talk to your doctor, nurse or pharmacist before following any medical regimen to see if it is safe and effective for you  Wellness Visit for Adults   AMBULATORY CARE:   A wellness visit  is when you see your healthcare provider to get screened for health problems  Your healthcare provider will also give you advice on how to stay healthy  Write down your questions so you remember to ask them  Ask your healthcare provider how often you should have a wellness visit  What happens at a wellness visit:  Your healthcare provider will ask about your health, and your family history of health problems  This includes high blood pressure, heart disease, and cancer   He or she will ask if you have symptoms that concern you, if you smoke, and about your mood  You may also be asked about your intake of medicines, supplements, food, and alcohol  Any of the following may be done:  · Your weight  will be checked  Your height may also be checked so your body mass index (BMI) can be calculated  Your BMI shows if you are at a healthy weight  · Your blood pressure  and heart rate will be checked  Your temperature may also be checked  · Blood and urine tests  may be done  Blood tests may be done to check your cholesterol levels  Abnormal cholesterol levels increase your risk for heart disease and stroke  You may also need a blood or urine test to check for diabetes if you are at increased risk  Urine tests may be done to look for signs of an infection or kidney disease  · A physical exam  includes checking your heartbeat and lungs with a stethoscope  Your healthcare provider may also check your skin to look for sun damage  · Screening tests  may be recommended  A screening test is done to check for diseases that may not cause symptoms  The screening tests you may need depend on your age, gender, family history, and lifestyle habits  For example, colorectal screening may be recommended if you are 48years old or older  Screening tests you need if you are a woman:   · A Pap smear  is used to screen for cervical cancer  Pap smears are usually done every 3 to 5 years depending on your age  You may need them more often if you have had abnormal Pap smear test results in the past  Ask your healthcare provider how often you should have a Pap smear  · A mammogram  is an x-ray of your breasts to screen for breast cancer  Experts recommend mammograms every 2 years starting at age 48 years  You may need a mammogram at age 52 years or younger if you have an increased risk for breast cancer   Talk to your healthcare provider about when you should start having mammograms and how often you need them     Vaccines you may need:   · Get an influenza vaccine  every year  The influenza vaccine protects you from the flu  Several types of viruses cause the flu  The viruses change over time, so new vaccines are made each year  · Get a tetanus-diphtheria (Td) booster vaccine  every 10 years  This vaccine protects you against tetanus and diphtheria  Tetanus is a severe infection that may cause painful muscle spasms and lockjaw  Diphtheria is a severe bacterial infection that causes a thick covering in the back of your mouth and throat  · Get a human papillomavirus (HPV) vaccine  if you are female and aged 23 to 32 or male 23 to 24 and never received it  This vaccine protects you from HPV infection  HPV is the most common infection spread by sexual contact  HPV may also cause vaginal, penile, and anal cancers  · Get a pneumococcal vaccine  if you are aged 72 years or older  The pneumococcal vaccine is an injection given to protect you from pneumococcal disease  Pneumococcal disease is an infection caused by pneumococcal bacteria  The infection may cause pneumonia, meningitis, or an ear infection  · Get a shingles vaccine  if you are 60 or older, even if you have had shingles before  The shingles vaccine is an injection to protect you from the varicella-zoster virus  This is the same virus that causes chickenpox  Shingles is a painful rash that develops in people who had chickenpox or have been exposed to the virus  How to eat healthy:  My Plate is a model for planning healthy meals  It shows the types and amounts of foods that should go on your plate  Fruits and vegetables make up about half of your plate, and grains and protein make up the other half  A serving of dairy is included on the side of your plate  The amount of calories and serving sizes you need depends on your age, gender, weight, and height   Examples of healthy foods are listed below:  · Eat a variety of vegetables  such as dark green, red, and orange vegetables  You can also include canned vegetables low in sodium (salt) and frozen vegetables without added butter or sauces  · Eat a variety of fresh fruits , canned fruit in 100% juice, frozen fruit, and dried fruit  · Include whole grains  At least half of the grains you eat should be whole grains  Examples include whole-wheat bread, wheat pasta, brown rice, and whole-grain cereals such as oatmeal     · Eat a variety of protein foods such as seafood (fish and shellfish), lean meat, and poultry without skin (turkey and chicken)  Examples of lean meats include pork leg, shoulder, or tenderloin, and beef round, sirloin, tenderloin, and extra lean ground beef  Other protein foods include eggs and egg substitutes, beans, peas, soy products, nuts, and seeds  · Choose low-fat dairy products such as skim or 1% milk or low-fat yogurt, cheese, and cottage cheese  · Limit unhealthy fats  such as butter, hard margarine, and shortening  Exercise:  Exercise at least 30 minutes per day on most days of the week  Some examples of exercise include walking, biking, dancing, and swimming  You can also fit in more physical activity by taking the stairs instead of the elevator or parking farther away from stores  Include muscle strengthening activities 2 days each week  Regular exercise provides many health benefits  It helps you manage your weight, and decreases your risk for type 2 diabetes, heart disease, stroke, and high blood pressure  Exercise can also help improve your mood  Ask your healthcare provider about the best exercise plan for you  General health and safety guidelines:   · Do not smoke  Nicotine and other chemicals in cigarettes and cigars can cause lung damage  Ask your healthcare provider for information if you currently smoke and need help to quit  E-cigarettes or smokeless tobacco still contain nicotine  Talk to your healthcare provider before you use these products      · Limit alcohol  A drink of alcohol is 12 ounces of beer, 5 ounces of wine, or 1½ ounces of liquor  · Lose weight, if needed  Being overweight increases your risk of certain health conditions  These include heart disease, high blood pressure, type 2 diabetes, and certain types of cancer  · Protect your skin  Do not sunbathe or use tanning beds  Use sunscreen with a SPF 15 or higher  Apply sunscreen at least 15 minutes before you go outside  Reapply sunscreen every 2 hours  Wear protective clothing, hats, and sunglasses when you are outside  · Drive safely  Always wear your seatbelt  Make sure everyone in your car wears a seatbelt  A seatbelt can save your life if you are in an accident  Do not use your cell phone when you are driving  This could distract you and cause an accident  Pull over if you need to make a call or send a text message  · Practice safe sex  Use latex condoms if are sexually active and have more than one partner  Your healthcare provider may recommend screening tests for sexually transmitted infections (STIs)  · Wear helmets, lifejackets, and protective gear  Always wear a helmet when you ride a bike or motorcycle, go skiing, or play sports that could cause a head injury  Wear protective equipment when you play sports  Wear a lifejacket when you are on a boat or doing water sports  © Copyright [address] Information is for End User's use only and may not be sold, redistributed or otherwise used for commercial purposes  All illustrations and images included in CareNotes® are the copyrighted property of [employer] M [employer]  or [facility] Jarvis Roberts   The above information is an  only  It is not intended as medical advice for individual conditions or treatments  Talk to your doctor, nurse or pharmacist before following any medical regimen to see if it is safe and effective for you

## 2021-06-01 NOTE — PROGRESS NOTES
Subjective:    HPI  Evonne Thorpe is a 56 y o  female here today for:  Chief Complaint   Patient presents with    Physical Exam          ---Above per clinical staff & reviewed  ---  HPI:  78-year-old female here for her well exam   Patient is doing well  Patient had her to eye surgeries and is feeling better  Patient is due for her mammogram   An order was given to her  Patient is due for a tetanus vaccine which was given today  Patient had colonoscopy in 2020 which was normal   Patient had mildly elevated LFTs last year and hepatitis-C screening and liver function panel was ordered  Patient never got this done so these labs are reprinted for her  Patient had COVID in November and is holding off on getting the vaccine at this time  Patient has lost 6 lb  She states that she is able to exercise more  Patient has seen dermatology for her skin issues on her eyes  I do not have these records in her chart and we will call the dermatology office for this  She states that she was told it was psoriasis and was given a steroid liquid for her scalp  They did not give her any treatment for her eyelids  She now has a spot on her neck that has been bothering her  I did tell her that I would get those records to review and that I would call in a steroid cream for her neck  Patient complains of left thumb pain  I did discuss that this could be arthritis and not an uncommon location  I did discuss an x-ray just check the severity  I also discussed possible physical therapy and/or injection if it becomes increasingly bothersome  The following portions of the patient's history were reviewed and updated as appropriate: allergies, current medications, past family history, past medical history, past social history, past surgical history and problem list     History reviewed  No pertinent past medical history      Past Surgical History:   Procedure Laterality Date    EYE SURGERY      FINGER SURGERY      KNEE ARTHROSCOPY      WISDOM TOOTH EXTRACTION         Social History     Socioeconomic History    Marital status: /Civil Union     Spouse name: Betsey Curran Number of children: 2    Years of education: masters    Highest education level: Master's degree (eamon jacobson , MA, MS, Reed, MEd, MSW, NATHALY)   Occupational History    Occupation:      Employer: [address] [employer] Financial resource strain: Not hard at all    Food insecurity     Worry: Never true     Inability: Never true    Transportation needs     Medical: No     Non-medical: No   Tobacco Use    Smoking status: Never Smoker    Smokeless tobacco: Never Used   Substance and Sexual Activity    Alcohol use: Yes     Frequency: Monthly or less     Comment: socially    Drug use: Never     Comment: No drug use - As per [employer]     Sexual activity: Yes     Partners: Male   Lifestyle    Physical activity     Days per week: 4 days     Minutes per session: None    Stress: Not at all   Relationships    Social connections     Talks on phone: None     Gets together: None     Attends Confucianist service: Never     Active member of club or organization: Yes     Attends meetings of clubs or organizations: None     Relationship status:     Intimate partner violence     Fear of current or ex partner: No     Emotionally abused: No     Physically abused: No     Forced sexual activity: No   Other Topics Concern    None   Social History Narrative    None       Current Outpatient Medications   Medication Sig Dispense Refill    clobetasol (TEMOVATE) 0 05 % external solution clobetasol 0 05 % scalp solution      dextran 70-hypromellose (Artificial Tears) 0 1-0 3 % ophthalmic solution Apply 1 drop to eye      ergocalciferol (VITAMIN D2) 50,000 units Take 1 tablet once or twice weekly until finished 12 capsule 0     No current facility-administered medications for this visit  Review of Systems   Constitutional: Negative  Negative for chills and fever  HENT: Negative  Negative for ear pain and sore throat  Eyes: Negative for pain and visual disturbance  Respiratory: Negative  Negative for cough and shortness of breath  Cardiovascular: Negative  Negative for chest pain and palpitations  Gastrointestinal: Negative  Negative for abdominal pain and vomiting  Genitourinary: Negative  Negative for dysuria and hematuria  Musculoskeletal: Positive for joint swelling  Negative for arthralgias and back pain  Skin: Positive for rash  Negative for color change  Neurological: Negative  Negative for seizures and syncope  Psychiatric/Behavioral: Negative  All other systems reviewed and are negative  Objective:    BP 90/60 (BP Location: Left arm, Patient Position: Sitting, Cuff Size: Adult)   Pulse 60   Temp 97 6 °F (36 4 °C) (Temporal)   Ht 5' 2" (1 575 m)   Wt 63 kg (138 lb 14 4 oz)   SpO2 98%   BMI 25 41 kg/m²   Wt Readings from Last 3 Encounters:   06/01/21 63 kg (138 lb 14 4 oz)   07/14/20 66 5 kg (146 lb 11 2 oz)   05/26/20 67 6 kg (149 lb)     BP Readings from Last 3 Encounters:   06/01/21 90/60   11/11/20 (!) 84/52   07/14/20 108/72       No results found for: WBC, HGB, HCT, PLT, CHOL, TRIG, HDL, LDLDIRECT, ALT, AST, NA, K, CL, CREATININE, BUN, CO2, TSH, PSA, INR, GLUF, HGBA1C, MICROALBUR    Physical Exam  Vitals signs and nursing note reviewed  Constitutional:       Appearance: Normal appearance  She is well-developed  HENT:      Head: Normocephalic and atraumatic  Right Ear: External ear normal       Left Ear: External ear normal       Nose: Nose normal       Mouth/Throat:      Mouth: Mucous membranes are moist    Eyes:      Conjunctiva/sclera: Conjunctivae normal       Pupils: Pupils are equal, round, and reactive to light  Neck:      Musculoskeletal: Normal range of motion and neck supple  Thyroid: No thyromegaly        Comments: No carotid bruits  Cardiovascular:      Rate and Rhythm: Normal rate and regular rhythm  Heart sounds: Normal heart sounds  No murmur  Pulmonary:      Effort: Pulmonary effort is normal  No respiratory distress  Breath sounds: Normal breath sounds  Abdominal:      General: Bowel sounds are normal  There is no distension  Palpations: Abdomen is soft  Tenderness: There is no abdominal tenderness  Musculoskeletal: Normal range of motion  General: No swelling, tenderness or deformity  Skin:     General: Skin is warm  Capillary Refill: Capillary refill takes less than 2 seconds  Neurological:      Mental Status: She is alert and oriented to person, place, and time  Cranial Nerves: No cranial nerve deficit  Psychiatric:         Mood and Affect: Mood normal          Thought Content: Thought content normal                BMI Counseling: Body mass index is 25 41 kg/m²  The BMI is above normal  Nutrition recommendations include moderation in carbohydrate intake and increasing intake of lean protein  Exercise recommendations include moderate physical activity 150 minutes/week  No pharmacotherapy was ordered  Patient referred to PCP due to patient being overweight  Assessment/Plan:   Jhoan Nolan was seen today for physical exam     Diagnoses and all orders for this visit:    Annual physical exam  -     Lipid panel; Future  -     CBC and Platelet; Future  -     Comprehensive metabolic panel; Future  -     TSH, 3rd generation with Free T4 reflex; Future    Encounter for screening mammogram for malignant neoplasm of breast  -     Mammo screening bilateral w 3d & cad; Future  -     Lipid panel; Future  -     CBC and Platelet; Future  -     Comprehensive metabolic panel; Future  -     TSH, 3rd generation with Free T4 reflex; Future    Screening for cardiovascular condition  -     Lipid panel; Future  -     CBC and Platelet; Future  -     Comprehensive metabolic panel;  Future  -     TSH, 3rd generation with Free T4 reflex; Future    Screening for thyroid disorder  -     Lipid panel; Future  -     CBC and Platelet; Future  -     Comprehensive metabolic panel; Future  -     TSH, 3rd generation with Free T4 reflex; Future    Elevated LFTs  -     Lipid panel; Future  -     CBC and Platelet; Future  -     Comprehensive metabolic panel; Future  -     TSH, 3rd generation with Free T4 reflex; Future    Beta-thalassemia (HCC)    Diabetic vitreous hemorrhage (HCC)    Vitamin D deficiency  -     Vitamin D 25 hydroxy; Future    Encounter for immunization  -     TDAP VACCINE GREATER THAN OR EQUAL TO 8YO IM    Pain of left thumb  -     XR hand 3+ vw left; Future      Return in about 1 year (around 6/1/2022) for Annual physical   Patient Instructions     Please get your labwork done fasting  Do not eat/drink for about 8-12 hours prior to getting the labwork done, however you may drink water or black coffee while you are fasting  Please use a [facility] lab if possible, as we receive the lab results more quickly  We will notify you of your labwork results even if they are normal   Please contact us if you do not hear about your lab results after one week  Low Carb Recommendations:  Please follow a low carbohydrate, high protein diet  [employer] is a good mick/computer program to keep food logs  Your meals should be less than 30 grams of carbohydrates  Your snacks should be less than 15 grams of carbohydrates  You should drink at least 64 ounces of water daily  You should walk at least 30 minutes daily  I am going to get records from dermatology to review  Please call for any questions or concerns  Yearly well exams are recommended  Wellness Visit for Adults   AMBULATORY CARE:   A wellness visit  is when you see your healthcare provider to get screened for health problems  Your healthcare provider will also give you advice on how to stay healthy  Write down your questions so you remember to ask them   Ask your healthcare provider how often you should have a wellness visit  What happens at a wellness visit:  Your healthcare provider will ask about your health, and your family history of health problems  This includes high blood pressure, heart disease, and cancer  He or she will ask if you have symptoms that concern you, if you smoke, and about your mood  You may also be asked about your intake of medicines, supplements, food, and alcohol  Any of the following may be done:  · Your weight  will be checked  Your height may also be checked so your body mass index (BMI) can be calculated  Your BMI shows if you are at a healthy weight  · Your blood pressure  and heart rate will be checked  Your temperature may also be checked  · Blood and urine tests  may be done  Blood tests may be done to check your cholesterol levels  Abnormal cholesterol levels increase your risk for heart disease and stroke  You may also need a blood or urine test to check for diabetes if you are at increased risk  Urine tests may be done to look for signs of an infection or kidney disease  · A physical exam  includes checking your heartbeat and lungs with a stethoscope  Your healthcare provider may also check your skin to look for sun damage  · Screening tests  may be recommended  A screening test is done to check for diseases that may not cause symptoms  The screening tests you may need depend on your age, gender, family history, and lifestyle habits  For example, colorectal screening may be recommended if you are 48years old or older  Screening tests you need if you are a woman:   · A Pap smear  is used to screen for cervical cancer  Pap smears are usually done every 3 to 5 years depending on your age  You may need them more often if you have had abnormal Pap smear test results in the past  Ask your healthcare provider how often you should have a Pap smear  · A mammogram  is an x-ray of your breasts to screen for breast cancer   Experts recommend mammograms every 2 years starting at age 48 years  You may need a mammogram at age 52 years or younger if you have an increased risk for breast cancer  Talk to your healthcare provider about when you should start having mammograms and how often you need them  Vaccines you may need:   · Get an influenza vaccine  every year  The influenza vaccine protects you from the flu  Several types of viruses cause the flu  The viruses change over time, so new vaccines are made each year  · Get a tetanus-diphtheria (Td) booster vaccine  every 10 years  This vaccine protects you against tetanus and diphtheria  Tetanus is a severe infection that may cause painful muscle spasms and lockjaw  Diphtheria is a severe bacterial infection that causes a thick covering in the back of your mouth and throat  · Get a human papillomavirus (HPV) vaccine  if you are female and aged 23 to 32 or male 23 to 24 and never received it  This vaccine protects you from HPV infection  HPV is the most common infection spread by sexual contact  HPV may also cause vaginal, penile, and anal cancers  · Get a pneumococcal vaccine  if you are aged 72 years or older  The pneumococcal vaccine is an injection given to protect you from pneumococcal disease  Pneumococcal disease is an infection caused by pneumococcal bacteria  The infection may cause pneumonia, meningitis, or an ear infection  · Get a shingles vaccine  if you are 60 or older, even if you have had shingles before  The shingles vaccine is an injection to protect you from the varicella-zoster virus  This is the same virus that causes chickenpox  Shingles is a painful rash that develops in people who had chickenpox or have been exposed to the virus  How to eat healthy:  My Plate is a model for planning healthy meals  It shows the types and amounts of foods that should go on your plate   Fruits and vegetables make up about half of your plate, and grains and protein make up the other half  A serving of dairy is included on the side of your plate  The amount of calories and serving sizes you need depends on your age, gender, weight, and height  Examples of healthy foods are listed below:  · Eat a variety of vegetables  such as dark green, red, and orange vegetables  You can also include canned vegetables low in sodium (salt) and frozen vegetables without added butter or sauces  · Eat a variety of fresh fruits , canned fruit in 100% juice, frozen fruit, and dried fruit  · Include whole grains  At least half of the grains you eat should be whole grains  Examples include whole-wheat bread, wheat pasta, brown rice, and whole-grain cereals such as oatmeal     · Eat a variety of protein foods such as seafood (fish and shellfish), lean meat, and poultry without skin (turkey and chicken)  Examples of lean meats include pork leg, shoulder, or tenderloin, and beef round, sirloin, tenderloin, and extra lean ground beef  Other protein foods include eggs and egg substitutes, beans, peas, soy products, nuts, and seeds  · Choose low-fat dairy products such as skim or 1% milk or low-fat yogurt, cheese, and cottage cheese  · Limit unhealthy fats  such as butter, hard margarine, and shortening  Exercise:  Exercise at least 30 minutes per day on most days of the week  Some examples of exercise include walking, biking, dancing, and swimming  You can also fit in more physical activity by taking the stairs instead of the elevator or parking farther away from stores  Include muscle strengthening activities 2 days each week  Regular exercise provides many health benefits  It helps you manage your weight, and decreases your risk for type 2 diabetes, heart disease, stroke, and high blood pressure  Exercise can also help improve your mood  Ask your healthcare provider about the best exercise plan for you  General health and safety guidelines:   · Do not smoke    Nicotine and other chemicals in cigarettes and cigars can cause lung damage  Ask your healthcare provider for information if you currently smoke and need help to quit  E-cigarettes or smokeless tobacco still contain nicotine  Talk to your healthcare provider before you use these products  · Limit alcohol  A drink of alcohol is 12 ounces of beer, 5 ounces of wine, or 1½ ounces of liquor  · Lose weight, if needed  Being overweight increases your risk of certain health conditions  These include heart disease, high blood pressure, type 2 diabetes, and certain types of cancer  · Protect your skin  Do not sunbathe or use tanning beds  Use sunscreen with a SPF 15 or higher  Apply sunscreen at least 15 minutes before you go outside  Reapply sunscreen every 2 hours  Wear protective clothing, hats, and sunglasses when you are outside  · Drive safely  Always wear your seatbelt  Make sure everyone in your car wears a seatbelt  A seatbelt can save your life if you are in an accident  Do not use your cell phone when you are driving  This could distract you and cause an accident  Pull over if you need to make a call or send a text message  · Practice safe sex  Use latex condoms if are sexually active and have more than one partner  Your healthcare provider may recommend screening tests for sexually transmitted infections (STIs)  · Wear helmets, lifejackets, and protective gear  Always wear a helmet when you ride a bike or motorcycle, go skiing, or play sports that could cause a head injury  Wear protective equipment when you play sports  Wear a lifejacket when you are on a boat or doing water sports  © Copyright [address] Information is for End User's use only and may not be sold, redistributed or otherwise used for commercial purposes  All illustrations and images included in CareNotes® are the copyrighted property of [employer]  or [facility] Jarvis Roberts   The above information is an  only   It is not intended as medical advice for individual conditions or treatments  Talk to your doctor, nurse or pharmacist before following any medical regimen to see if it is safe and effective for you  Wellness Visit for Adults   AMBULATORY CARE:   A wellness visit  is when you see your healthcare provider to get screened for health problems  Your healthcare provider will also give you advice on how to stay healthy  Write down your questions so you remember to ask them  Ask your healthcare provider how often you should have a wellness visit  What happens at a wellness visit:  Your healthcare provider will ask about your health, and your family history of health problems  This includes high blood pressure, heart disease, and cancer  He or she will ask if you have symptoms that concern you, if you smoke, and about your mood  You may also be asked about your intake of medicines, supplements, food, and alcohol  Any of the following may be done:  · Your weight  will be checked  Your height may also be checked so your body mass index (BMI) can be calculated  Your BMI shows if you are at a healthy weight  · Your blood pressure  and heart rate will be checked  Your temperature may also be checked  · Blood and urine tests  may be done  Blood tests may be done to check your cholesterol levels  Abnormal cholesterol levels increase your risk for heart disease and stroke  You may also need a blood or urine test to check for diabetes if you are at increased risk  Urine tests may be done to look for signs of an infection or kidney disease  · A physical exam  includes checking your heartbeat and lungs with a stethoscope  Your healthcare provider may also check your skin to look for sun damage  · Screening tests  may be recommended  A screening test is done to check for diseases that may not cause symptoms  The screening tests you may need depend on your age, gender, family history, and lifestyle habits   For example, colorectal screening may be recommended if you are 48years old or older  Screening tests you need if you are a woman:   · A Pap smear  is used to screen for cervical cancer  Pap smears are usually done every 3 to 5 years depending on your age  You may need them more often if you have had abnormal Pap smear test results in the past  Ask your healthcare provider how often you should have a Pap smear  · A mammogram  is an x-ray of your breasts to screen for breast cancer  Experts recommend mammograms every 2 years starting at age 48 years  You may need a mammogram at age 52 years or younger if you have an increased risk for breast cancer  Talk to your healthcare provider about when you should start having mammograms and how often you need them  Vaccines you may need:   · Get an influenza vaccine  every year  The influenza vaccine protects you from the flu  Several types of viruses cause the flu  The viruses change over time, so new vaccines are made each year  · Get a tetanus-diphtheria (Td) booster vaccine  every 10 years  This vaccine protects you against tetanus and diphtheria  Tetanus is a severe infection that may cause painful muscle spasms and lockjaw  Diphtheria is a severe bacterial infection that causes a thick covering in the back of your mouth and throat  · Get a human papillomavirus (HPV) vaccine  if you are female and aged 23 to 32 or male 23 to 24 and never received it  This vaccine protects you from HPV infection  HPV is the most common infection spread by sexual contact  HPV may also cause vaginal, penile, and anal cancers  · Get a pneumococcal vaccine  if you are aged 72 years or older  The pneumococcal vaccine is an injection given to protect you from pneumococcal disease  Pneumococcal disease is an infection caused by pneumococcal bacteria  The infection may cause pneumonia, meningitis, or an ear infection  · Get a shingles vaccine  if you are 60 or older, even if you have had shingles before   The shingles vaccine is an injection to protect you from the varicella-zoster virus  This is the same virus that causes chickenpox  Shingles is a painful rash that develops in people who had chickenpox or have been exposed to the virus  How to eat healthy:  My Plate is a model for planning healthy meals  It shows the types and amounts of foods that should go on your plate  Fruits and vegetables make up about half of your plate, and grains and protein make up the other half  A serving of dairy is included on the side of your plate  The amount of calories and serving sizes you need depends on your age, gender, weight, and height  Examples of healthy foods are listed below:  · Eat a variety of vegetables  such as dark green, red, and orange vegetables  You can also include canned vegetables low in sodium (salt) and frozen vegetables without added butter or sauces  · Eat a variety of fresh fruits , canned fruit in 100% juice, frozen fruit, and dried fruit  · Include whole grains  At least half of the grains you eat should be whole grains  Examples include whole-wheat bread, wheat pasta, brown rice, and whole-grain cereals such as oatmeal     · Eat a variety of protein foods such as seafood (fish and shellfish), lean meat, and poultry without skin (turkey and chicken)  Examples of lean meats include pork leg, shoulder, or tenderloin, and beef round, sirloin, tenderloin, and extra lean ground beef  Other protein foods include eggs and egg substitutes, beans, peas, soy products, nuts, and seeds  · Choose low-fat dairy products such as skim or 1% milk or low-fat yogurt, cheese, and cottage cheese  · Limit unhealthy fats  such as butter, hard margarine, and shortening  Exercise:  Exercise at least 30 minutes per day on most days of the week  Some examples of exercise include walking, biking, dancing, and swimming   You can also fit in more physical activity by taking the stairs instead of the elevator or parking farther away from stores  Include muscle strengthening activities 2 days each week  Regular exercise provides many health benefits  It helps you manage your weight, and decreases your risk for type 2 diabetes, heart disease, stroke, and high blood pressure  Exercise can also help improve your mood  Ask your healthcare provider about the best exercise plan for you  General health and safety guidelines:   · Do not smoke  Nicotine and other chemicals in cigarettes and cigars can cause lung damage  Ask your healthcare provider for information if you currently smoke and need help to quit  E-cigarettes or smokeless tobacco still contain nicotine  Talk to your healthcare provider before you use these products  · Limit alcohol  A drink of alcohol is 12 ounces of beer, 5 ounces of wine, or 1½ ounces of liquor  · Lose weight, if needed  Being overweight increases your risk of certain health conditions  These include heart disease, high blood pressure, type 2 diabetes, and certain types of cancer  · Protect your skin  Do not sunbathe or use tanning beds  Use sunscreen with a SPF 15 or higher  Apply sunscreen at least 15 minutes before you go outside  Reapply sunscreen every 2 hours  Wear protective clothing, hats, and sunglasses when you are outside  · Drive safely  Always wear your seatbelt  Make sure everyone in your car wears a seatbelt  A seatbelt can save your life if you are in an accident  Do not use your cell phone when you are driving  This could distract you and cause an accident  Pull over if you need to make a call or send a text message  · Practice safe sex  Use latex condoms if are sexually active and have more than one partner  Your healthcare provider may recommend screening tests for sexually transmitted infections (STIs)  · Wear helmets, lifejackets, and protective gear    Always wear a helmet when you ride a bike or motorcycle, go skiing, or play sports that could cause a head injury  Wear protective equipment when you play sports  Wear a lifejacket when you are on a boat or doing water sports  © Copyright [address] Information is for End User's use only and may not be sold, redistributed or otherwise used for commercial purposes  All illustrations and images included in CareNotes® are the copyrighted property of A D A M , Inc  or [facility] Jarvis Roberts   The above information is an  only  It is not intended as medical advice for individual conditions or treatments  Talk to your doctor, nurse or pharmacist before following any medical regimen to see if it is safe and effective for you

## 2021-06-10 ENCOUNTER — APPOINTMENT (OUTPATIENT)
Dept: LAB | Facility: CLINIC | Age: 61
End: 2021-06-10
Payer: COMMERCIAL

## 2021-06-10 DIAGNOSIS — Z00.00 ANNUAL PHYSICAL EXAM: ICD-10-CM

## 2021-06-10 DIAGNOSIS — R79.89 ELEVATED LFTS: ICD-10-CM

## 2021-06-10 DIAGNOSIS — Z13.29 SCREENING FOR THYROID DISORDER: ICD-10-CM

## 2021-06-10 DIAGNOSIS — E55.9 VITAMIN D DEFICIENCY: ICD-10-CM

## 2021-06-10 DIAGNOSIS — Z12.31 ENCOUNTER FOR SCREENING MAMMOGRAM FOR MALIGNANT NEOPLASM OF BREAST: ICD-10-CM

## 2021-06-10 DIAGNOSIS — Z13.6 SCREENING FOR CARDIOVASCULAR CONDITION: ICD-10-CM

## 2021-06-10 LAB
25(OH)D3 SERPL-MCNC: 21.2 NG/ML (ref 30–100)
ALBUMIN SERPL BCP-MCNC: 3.7 G/DL (ref 3.5–5)
ALP SERPL-CCNC: 46 U/L (ref 46–116)
ALT SERPL W P-5'-P-CCNC: 39 U/L (ref 12–78)
ANION GAP SERPL CALCULATED.3IONS-SCNC: 3 MMOL/L (ref 4–13)
AST SERPL W P-5'-P-CCNC: 30 U/L (ref 5–45)
BILIRUB SERPL-MCNC: 0.67 MG/DL (ref 0.2–1)
BUN SERPL-MCNC: 18 MG/DL (ref 5–25)
CALCIUM SERPL-MCNC: 9.6 MG/DL (ref 8.3–10.1)
CHLORIDE SERPL-SCNC: 104 MMOL/L (ref 100–108)
CHOLEST SERPL-MCNC: 179 MG/DL (ref 50–200)
CO2 SERPL-SCNC: 30 MMOL/L (ref 21–32)
CREAT SERPL-MCNC: 0.88 MG/DL (ref 0.6–1.3)
ERYTHROCYTE [DISTWIDTH] IN BLOOD BY AUTOMATED COUNT: 18.1 % (ref 11.6–15.1)
GFR SERPL CREATININE-BSD FRML MDRD: 71 ML/MIN/1.73SQ M
GLUCOSE P FAST SERPL-MCNC: 81 MG/DL (ref 65–99)
HCT VFR BLD AUTO: 37.1 % (ref 34.8–46.1)
HDLC SERPL-MCNC: 82 MG/DL
HGB BLD-MCNC: 10.8 G/DL (ref 11.5–15.4)
LDLC SERPL CALC-MCNC: 84 MG/DL (ref 0–100)
MCH RBC QN AUTO: 18.2 PG (ref 26.8–34.3)
MCHC RBC AUTO-ENTMCNC: 29.1 G/DL (ref 31.4–37.4)
MCV RBC AUTO: 63 FL (ref 82–98)
NONHDLC SERPL-MCNC: 97 MG/DL
PLATELET # BLD AUTO: 245 THOUSANDS/UL (ref 149–390)
PMV BLD AUTO: 10.9 FL (ref 8.9–12.7)
POTASSIUM SERPL-SCNC: 4.6 MMOL/L (ref 3.5–5.3)
PROT SERPL-MCNC: 7.5 G/DL (ref 6.4–8.2)
RBC # BLD AUTO: 5.92 MILLION/UL (ref 3.81–5.12)
SODIUM SERPL-SCNC: 137 MMOL/L (ref 136–145)
TRIGL SERPL-MCNC: 66 MG/DL
TSH SERPL DL<=0.05 MIU/L-ACNC: 1.99 UIU/ML (ref 0.36–3.74)
WBC # BLD AUTO: 5.43 THOUSAND/UL (ref 4.31–10.16)

## 2021-06-10 PROCEDURE — 85027 COMPLETE CBC AUTOMATED: CPT

## 2021-06-10 PROCEDURE — 82306 VITAMIN D 25 HYDROXY: CPT

## 2021-06-10 PROCEDURE — 84443 ASSAY THYROID STIM HORMONE: CPT

## 2021-06-10 PROCEDURE — 80061 LIPID PANEL: CPT

## 2021-06-10 PROCEDURE — 80053 COMPREHEN METABOLIC PANEL: CPT

## 2021-06-10 PROCEDURE — 36415 COLL VENOUS BLD VENIPUNCTURE: CPT

## 2021-06-14 ENCOUNTER — APPOINTMENT (OUTPATIENT)
Dept: RADIOLOGY | Facility: MEDICAL CENTER | Age: 61
End: 2021-06-14
Payer: COMMERCIAL

## 2021-06-14 ENCOUNTER — TELEPHONE (OUTPATIENT)
Dept: FAMILY MEDICINE CLINIC | Facility: CLINIC | Age: 61
End: 2021-06-14

## 2021-06-14 DIAGNOSIS — R21 RASH: Primary | ICD-10-CM

## 2021-06-14 DIAGNOSIS — M79.645 PAIN OF LEFT THUMB: ICD-10-CM

## 2021-06-14 PROCEDURE — 73140 X-RAY EXAM OF FINGER(S): CPT

## 2021-06-14 RX ORDER — TRIAMCINOLONE ACETONIDE 5 MG/G
CREAM TOPICAL 3 TIMES DAILY
Qty: 30 G | Refills: 0 | Status: SHIPPED | OUTPATIENT
Start: 2021-06-14 | End: 2022-06-09 | Stop reason: ALTCHOICE

## 2021-06-14 NOTE — TELEPHONE ENCOUNTER
Patient called in stating that she will need a stronger cream called in for her rash on her neck? She said she discussed this with you at her last visit?

## 2021-07-14 ENCOUNTER — TELEPHONE (OUTPATIENT)
Dept: FAMILY MEDICINE CLINIC | Facility: CLINIC | Age: 61
End: 2021-07-14

## 2021-07-14 NOTE — TELEPHONE ENCOUNTER
----- Message from Norah Tee sent at 7/13/2021  5:09 PM EDT -----  Regarding: Test Results Question  Contact: [phone]  When viewing lab and x-ray test results on [place], I see a notation that Dr Lian Steward tried to call me and would try again the next day  That was from almost a month ago  As far as I can tell, no one ever tried to call me about these test results

## 2022-05-23 ENCOUNTER — RA CDI HCC (OUTPATIENT)
Dept: OTHER | Facility: HOSPITAL | Age: 62
End: 2022-05-23

## 2022-05-23 NOTE — PROGRESS NOTES
[facility] 75  coding opportunities       Chart reviewed, no opportunity found: CHART REVIEWED, NO OPPORTUNITY FOUND        Patients Insurance        Commercial Insurance: [employer]

## 2022-06-09 ENCOUNTER — OFFICE VISIT (OUTPATIENT)
Dept: FAMILY MEDICINE CLINIC | Facility: CLINIC | Age: 62
End: 2022-06-09
Payer: COMMERCIAL

## 2022-06-09 ENCOUNTER — TELEPHONE (OUTPATIENT)
Dept: ADMINISTRATIVE | Facility: OTHER | Age: 62
End: 2022-06-09

## 2022-06-09 VITALS
SYSTOLIC BLOOD PRESSURE: 102 MMHG | OXYGEN SATURATION: 98 % | BODY MASS INDEX: 26.08 KG/M2 | HEIGHT: 63 IN | DIASTOLIC BLOOD PRESSURE: 62 MMHG | HEART RATE: 60 BPM | WEIGHT: 147.2 LBS | TEMPERATURE: 96.7 F

## 2022-06-09 DIAGNOSIS — R79.89 ELEVATED LFTS: ICD-10-CM

## 2022-06-09 DIAGNOSIS — Z78.0 ASYMPTOMATIC POSTMENOPAUSAL STATE: ICD-10-CM

## 2022-06-09 DIAGNOSIS — D56.1 BETA-THALASSEMIA (HCC): ICD-10-CM

## 2022-06-09 DIAGNOSIS — Z13.820 SCREENING FOR OSTEOPOROSIS: ICD-10-CM

## 2022-06-09 DIAGNOSIS — Z13.6 SCREENING FOR CARDIOVASCULAR CONDITION: ICD-10-CM

## 2022-06-09 DIAGNOSIS — Z12.31 ENCOUNTER FOR SCREENING MAMMOGRAM FOR BREAST CANCER: ICD-10-CM

## 2022-06-09 DIAGNOSIS — M25.50 ARTHRALGIA, UNSPECIFIED JOINT: ICD-10-CM

## 2022-06-09 DIAGNOSIS — M79.675 PAIN OF TOE OF LEFT FOOT: ICD-10-CM

## 2022-06-09 DIAGNOSIS — Z00.00 ANNUAL PHYSICAL EXAM: Primary | ICD-10-CM

## 2022-06-09 DIAGNOSIS — H33.309: ICD-10-CM

## 2022-06-09 DIAGNOSIS — L40.9 PSORIASIS: ICD-10-CM

## 2022-06-09 PROBLEM — S42.293A CLOSED FRACTURE OF HEAD OF HUMERUS: Status: ACTIVE | Noted: 2021-08-10

## 2022-06-09 PROBLEM — H35.372 EPIRETINAL MEMBRANE (ERM) OF LEFT EYE: Status: ACTIVE | Noted: 2019-09-11

## 2022-06-09 PROBLEM — E11.39 DIABETIC VITREOUS HEMORRHAGE (HCC): Status: RESOLVED | Noted: 2019-09-11 | Resolved: 2022-06-09

## 2022-06-09 PROBLEM — H43.10 DIABETIC VITREOUS HEMORRHAGE (HCC): Status: RESOLVED | Noted: 2019-09-11 | Resolved: 2022-06-09

## 2022-06-09 PROCEDURE — 3008F BODY MASS INDEX DOCD: CPT | Performed by: FAMILY MEDICINE

## 2022-06-09 PROCEDURE — 99396 PREV VISIT EST AGE 40-64: CPT | Performed by: FAMILY MEDICINE

## 2022-06-09 PROCEDURE — 99214 OFFICE O/P EST MOD 30 MIN: CPT | Performed by: FAMILY MEDICINE

## 2022-06-09 PROCEDURE — 1036F TOBACCO NON-USER: CPT | Performed by: FAMILY MEDICINE

## 2022-06-09 PROCEDURE — 3725F SCREEN DEPRESSION PERFORMED: CPT | Performed by: FAMILY MEDICINE

## 2022-06-09 NOTE — PATIENT INSTRUCTIONS
Please get your labwork done fasting  Do not eat/drink for about 8-12 hours prior to getting the labwork done, however you may drink water or black coffee while you are fasting  Please use a [facility] lab if possible, as we receive the lab results more quickly  We will notify you of your labwork results even if they are normal   Please contact us if you do not hear about your lab results after one week  Low Carb Recommendations:  Please follow a low carbohydrate, high protein diet  Wh[employer]View[employer] is a good mick/computer program to keep food logs  Your meals should be less than 30 grams of carbohydrates  Your snacks should be less than 15 grams of carbohydrates  You should drink at least 64 ounces of water daily  You should walk at least 30 minutes daily  Get toe xray when you can  Check with insurance regarding DEXA scan, pneumonia vaccine  Mammogram due in July  Yearly visit recommended  Call for any questions or concerns  Wellness Visit for Adults   AMBULATORY CARE:   A wellness visit  is when you see your healthcare provider to get screened for health problems  Your healthcare provider will also give you advice on how to stay healthy  Write down your questions so you remember to ask them  Ask your healthcare provider how often you should have a wellness visit  What happens at a wellness visit:  Your healthcare provider will ask about your health, and your family history of health problems  This includes high blood pressure, heart disease, and cancer  He or she will ask if you have symptoms that concern you, if you smoke, and about your mood  You may also be asked about your intake of medicines, supplements, food, and alcohol  Any of the following may be done: Your weight  will be checked  Your height may also be checked so your body mass index (BMI) can be calculated  Your BMI shows if you are at a healthy weight  Your blood pressure  and heart rate will be checked   Your temperature may also be checked  Blood and urine tests  may be done  Blood tests may be done to check your cholesterol levels  Abnormal cholesterol levels increase your risk for heart disease and stroke  You may also need a blood or urine test to check for diabetes if you are at increased risk  Urine tests may be done to look for signs of an infection or kidney disease  A physical exam  includes checking your heartbeat and lungs with a stethoscope  Your healthcare provider may also check your skin to look for sun damage  Screening tests  may be recommended  A screening test is done to check for diseases that may not cause symptoms  The screening tests you may need depend on your age, gender, family history, and lifestyle habits  For example, colorectal screening may be recommended if you are 48years old or older  Screening tests you need if you are a woman:   A Pap smear  is used to screen for cervical cancer  Pap smears are usually done every 3 to 5 years depending on your age  You may need them more often if you have had abnormal Pap smear test results in the past  Ask your healthcare provider how often you should have a Pap smear  A mammogram  is an x-ray of your breasts to screen for breast cancer  Experts recommend mammograms every 2 years starting at age 48 years  You may need a mammogram at age 52 years or younger if you have an increased risk for breast cancer  Talk to your healthcare provider about when you should start having mammograms and how often you need them  Vaccines you may need:   Get an influenza vaccine  every year  The influenza vaccine protects you from the flu  Several types of viruses cause the flu  The viruses change over time, so new vaccines are made each year  Get a tetanus-diphtheria (Td) booster vaccine  every 10 years  This vaccine protects you against tetanus and diphtheria  Tetanus is a severe infection that may cause painful muscle spasms and lockjaw  Diphtheria is a severe bacterial infection that causes a thick covering in the back of your mouth and throat  Get a human papillomavirus (HPV) vaccine  if you are female and aged 23 to 32 or male 23 to 24 and never received it  This vaccine protects you from HPV infection  HPV is the most common infection spread by sexual contact  HPV may also cause vaginal, penile, and anal cancers  Get a pneumococcal vaccine  if you are aged 72 years or older  The pneumococcal vaccine is an injection given to protect you from pneumococcal disease  Pneumococcal disease is an infection caused by pneumococcal bacteria  The infection may cause pneumonia, meningitis, or an ear infection  Get a shingles vaccine  if you are 60 or older, even if you have had shingles before  The shingles vaccine is an injection to protect you from the varicella-zoster virus  This is the same virus that causes chickenpox  Shingles is a painful rash that develops in people who had chickenpox or have been exposed to the virus  How to eat healthy:  My Plate is a model for planning healthy meals  It shows the types and amounts of foods that should go on your plate  Fruits and vegetables make up about half of your plate, and grains and protein make up the other half  A serving of dairy is included on the side of your plate  The amount of calories and serving sizes you need depends on your age, gender, weight, and height  Examples of healthy foods are listed below:  Eat a variety of vegetables  such as dark green, red, and orange vegetables  You can also include canned vegetables low in sodium (salt) and frozen vegetables without added butter or sauces  Eat a variety of fresh fruits , canned fruit in 100% juice, frozen fruit, and dried fruit  Include whole grains  At least half of the grains you eat should be whole grains   Examples include whole-wheat bread, wheat pasta, brown rice, and whole-grain cereals such as oatmeal     Eat a variety of protein foods such as seafood (fish and shellfish), lean meat, and poultry without skin (turkey and chicken)  Examples of lean meats include pork leg, shoulder, or tenderloin, and beef round, sirloin, tenderloin, and extra lean ground beef  Other protein foods include eggs and egg substitutes, beans, peas, soy products, nuts, and seeds  Choose low-fat dairy products such as skim or 1% milk or low-fat yogurt, cheese, and cottage cheese  Limit unhealthy fats  such as butter, hard margarine, and shortening  Exercise:  Exercise at least 30 minutes per day on most days of the week  Some examples of exercise include walking, biking, dancing, and swimming  You can also fit in more physical activity by taking the stairs instead of the elevator or parking farther away from stores  Include muscle strengthening activities 2 days each week  Regular exercise provides many health benefits  It helps you manage your weight, and decreases your risk for type 2 diabetes, heart disease, stroke, and high blood pressure  Exercise can also help improve your mood  Ask your healthcare provider about the best exercise plan for you  General health and safety guidelines:   Do not smoke  Nicotine and other chemicals in cigarettes and cigars can cause lung damage  Ask your healthcare provider for information if you currently smoke and need help to quit  E-cigarettes or smokeless tobacco still contain nicotine  Talk to your healthcare provider before you use these products  Limit alcohol  A drink of alcohol is 12 ounces of beer, 5 ounces of wine, or 1½ ounces of liquor  Lose weight, if needed  Being overweight increases your risk of certain health conditions  These include heart disease, high blood pressure, type 2 diabetes, and certain types of cancer  Protect your skin  Do not sunbathe or use tanning beds  Use sunscreen with a SPF 15 or higher  Apply sunscreen at least 15 minutes before you go outside   Reapply sunscreen every 2 hours  Wear protective clothing, hats, and sunglasses when you are outside  Drive safely  Always wear your seatbelt  Make sure everyone in your car wears a seatbelt  A seatbelt can save your life if you are in an accident  Do not use your cell phone when you are driving  This could distract you and cause an accident  Pull over if you need to make a call or send a text message  Practice safe sex  Use latex condoms if are sexually active and have more than one partner  Your healthcare provider may recommend screening tests for sexually transmitted infections (STIs)  Wear helmets, lifejackets, and protective gear  Always wear a helmet when you ride a bike or motorcycle, go skiing, or play sports that could cause a head injury  Wear protective equipment when you play sports  Wear a lifejacket when you are on a boat or doing water sports  © Copyright [employer] 2022 Information is for End User's use only and may not be sold, redistributed or otherwise used for commercial purposes  All illustrations and images included in CareNotes® are the copyrighted property of [employer]  or [facility] aJrvis Roberts   The above information is an  only  It is not intended as medical advice for individual conditions or treatments  Talk to your doctor, nurse or pharmacist before following any medical regimen to see if it is safe and effective for you

## 2022-06-09 NOTE — PROGRESS NOTES
Subjective:    HPI  Lisseth Morales is a 59 y o  female here today for:  Chief Complaint   Patient presents with    Physical Exam    Toe Pain     Got hit with a softball on her right foot and her toe looks different and she is having right  knee pain started two days ago          ---Above per clinical staff & reviewed  ---  HPI:  54yof here for well exam  Pt also has some issues to discuss  Due for mammogram and DEXA- had one in 2013 for family history of bone loss- recommended she check with insurance regarding DEXA scan  Has been seen in past by dermatology and had biopsy of area on eyelid- diagnosed with psoriasis  Is wondering if joint pain was possibly related to psoriasis  Hit in foot with softball awhile ago, still has issue with toe discoloration- pic of toe at time of incident showed what looked like hematoma right at the base of the nail    The following portions of the patient's history were reviewed and updated as appropriate: allergies, current medications, past family history, past medical history, past social history, past surgical history and problem list     History reviewed  No pertinent past medical history      Past Surgical History:   Procedure Laterality Date    EYE SURGERY      FINGER SURGERY      KNEE ARTHROSCOPY      WISDOM TOOTH EXTRACTION         Social History     Socioeconomic History    Marital status: /Civil Union     Spouse name: Kristin Tolbert Number of children: 2    Years of education: masters    Highest education level: Master's degree (e g , MA, MS, Reed, MEd, MSW, NATHALY)   Occupational History    Occupation:      Employer: [employer]   Tobacco Use    Smoking status: Never Smoker    Smokeless tobacco: Never Used   Vaping Use    Vaping Use: Never used   Substance and Sexual Activity    Alcohol use: Yes     Comment: socially    Drug use: Never     Comment: No drug use - As per [employer]     Sexual activity: Yes     Partners: Male   Other Topics Concern    None   Social History Narrative    None     Social Determinants of Health     Financial Resource Strain: Not on file   Food Insecurity: Not on file   Transportation Needs: Not on file   Physical Activity: Not on file   Stress: Not on file   Social Connections: Not on file   Intimate Partner Violence: Not on file   Housing Stability: Not on file       Current Outpatient Medications   Medication Sig Dispense Refill    clobetasol (TEMOVATE) 0 05 % external solution clobetasol 0 05 % scalp solution      dextran 70-hypromellose (Artificial Tears) 0 1-0 3 % ophthalmic solution Apply 1 drop to eye      Nutritional Supplements (VITAMIN D BOOSTER PO) Take by mouth       No current facility-administered medications for this visit  Review of Systems   Constitutional: Negative  Negative for chills and fever  HENT: Negative  Negative for ear pain and sore throat  Eyes: Negative for pain and visual disturbance  Respiratory: Negative  Negative for cough and shortness of breath  Cardiovascular: Negative  Negative for chest pain and palpitations  Gastrointestinal: Negative  Negative for abdominal pain and vomiting  Genitourinary: Negative  Negative for dysuria and hematuria  Musculoskeletal: Positive for joint swelling  Negative for arthralgias and back pain  Skin: Positive for rash  Negative for color change  Neurological: Negative  Negative for seizures and syncope  Psychiatric/Behavioral: Negative           Objective:    /62 (BP Location: Left arm, Patient Position: Sitting, Cuff Size: Adult)   Pulse 60   Temp (!) 96 7 °F (35 9 °C) (Temporal)   Ht 5' 2 99" (1 6 m)   Wt 66 8 kg (147 lb 3 2 oz)   SpO2 98%   BMI 26 08 kg/m²   Wt Readings from Last 3 Encounters:   06/09/22 66 8 kg (147 lb 3 2 oz)   06/01/21 63 kg (138 lb 14 4 oz)   07/14/20 66 5 kg (146 lb 11 2 oz)     BP Readings from Last 3 Encounters:   06/09/22 102/62   06/01/21 90/60   11/11/20 (!) 84/52       Lab Results   Component Value Date    WBC 5 10 06/16/2022    HGB 10 2 (L) 06/16/2022    HCT 33 5 (L) 06/16/2022     06/16/2022    TRIG 66 06/10/2021    HDL 82 06/10/2021    ALT 39 06/10/2021    AST 30 06/10/2021    K 4 6 06/10/2021     06/10/2021    CREATININE 0 88 06/10/2021    BUN 18 06/10/2021    CO2 30 06/10/2021    GLUF 81 06/10/2021       Physical Exam  Vitals and nursing note reviewed  Constitutional:       Appearance: Normal appearance  She is well-developed  HENT:      Head: Normocephalic and atraumatic  Right Ear: External ear normal       Left Ear: External ear normal       Nose: Nose normal       Mouth/Throat:      Mouth: Mucous membranes are moist    Eyes:      Conjunctiva/sclera: Conjunctivae normal       Pupils: Pupils are equal, round, and reactive to light  Neck:      Thyroid: No thyromegaly  Comments: No carotid bruits  Cardiovascular:      Rate and Rhythm: Normal rate and regular rhythm  Heart sounds: Normal heart sounds  No murmur heard  Pulmonary:      Effort: Pulmonary effort is normal  No respiratory distress  Breath sounds: Normal breath sounds  Abdominal:      General: Bowel sounds are normal  There is no distension  Palpations: Abdomen is soft  Tenderness: There is no abdominal tenderness  Musculoskeletal:         General: Normal range of motion  Cervical back: Normal range of motion and neck supple  Comments: Toe with slight discoloration at base of cuticle   Skin:     General: Skin is warm  Capillary Refill: Capillary refill takes less than 2 seconds  Neurological:      Mental Status: She is alert and oriented to person, place, and time  Cranial Nerves: No cranial nerve deficit  Psychiatric:         Mood and Affect: Mood normal          Thought Content: Thought content normal                BMI Counseling: Body mass index is 26 08 kg/m²   The BMI is above normal  Nutrition recommendations include moderation in carbohydrate intake and increasing intake of lean protein  Exercise recommendations include moderate physical activity 150 minutes/week  No pharmacotherapy was ordered  Patient referred to PCP  Rationale for BMI follow-up plan is due to patient being overweight or obese  Depression Screening and Follow-up Plan: Patient was screened for depression during today's encounter  They screened negative with a PHQ-2 score of 0  Assessment/Plan:   Abi Foster was seen today for physical exam and toe pain  Diagnoses and all orders for this visit:    Annual physical exam  -     Lipid panel; Future  -     Hemoglobin A1C; Future  -     CBC and Platelet; Future  -     Comprehensive metabolic panel; Future  -     TSH, 3rd generation with Free T4 reflex; Future    Beta-thalassemia ([facility] [address] )  -     Lipid panel; Future  -     Hemoglobin A1C; Future  -     CBC and Platelet; Future  -     Comprehensive metabolic panel; Future  -     TSH, 3rd generation with Free T4 reflex; Future    Tear of retina without detachment, unspecified laterality  -     Lipid panel; Future  -     Hemoglobin A1C; Future  -     CBC and Platelet; Future  -     Comprehensive metabolic panel; Future  -     TSH, 3rd generation with Free T4 reflex; Future  -     Uric acid; Future  -     Sedimentation rate, automated; Future  -     C-reactive protein; Future  -     RF Screen w/ Reflex to Titer; Future  -     Cyclic citrul peptide antibody, IgG; Future  -     Antinuclear Antibodies (AIME), IFA; Future    Elevated LFTs  -     Lipid panel; Future  -     Hemoglobin A1C; Future  -     CBC and Platelet; Future  -     Comprehensive metabolic panel; Future  -     TSH, 3rd generation with Free T4 reflex; Future    Encounter for screening mammogram for breast cancer  -     Mammo screening bilateral w 3d & cad; Future  -     Lipid panel; Future  -     Hemoglobin A1C; Future  -     CBC and Platelet; Future  -     Comprehensive metabolic panel;  Future  -     TSH, 3rd generation with Free T4 reflex; Future    Screening for cardiovascular condition  -     Lipid panel; Future  -     Hemoglobin A1C; Future  -     CBC and Platelet; Future  -     Comprehensive metabolic panel; Future  -     TSH, 3rd generation with Free T4 reflex; Future    Asymptomatic postmenopausal state  -     DXA bone density spine hip and pelvis; Future    Screening for osteoporosis  -     DXA bone density spine hip and pelvis; Future    Pain of toe of left foot  -     XR foot 3+ vw left; Future  -     Uric acid; Future  -     Sedimentation rate, automated; Future  -     C-reactive protein; Future  -     RF Screen w/ Reflex to Titer; Future  -     Cyclic citrul peptide antibody, IgG; Future  -     Antinuclear Antibodies (AIME), IFA; Future    BMI 26 0-26 9,adult    Psoriasis  -     Uric acid; Future  -     Sedimentation rate, automated; Future  -     C-reactive protein; Future  -     RF Screen w/ Reflex to Titer; Future  -     Cyclic citrul peptide antibody, IgG; Future  -     Antinuclear Antibodies (AIME), IFA; Future    Arthralgia, unspecified joint  -     Uric acid; Future  -     Sedimentation rate, automated; Future  -     C-reactive protein; Future  -     RF Screen w/ Reflex to Titer; Future  -     Cyclic citrul peptide antibody, IgG; Future  -     Antinuclear Antibodies (AIME), IFA; Future    Return in about 1 year (around 6/9/2023) for Annual physical   Patient Instructions   Please get your labwork done fasting  Do not eat/drink for about 8-12 hours prior to getting the labwork done, however you may drink water or black coffee while you are fasting  Please use a [facility] lab if possible, as we receive the lab results more quickly  We will notify you of your labwork results even if they are normal   Please contact us if you do not hear about your lab results after one week  Low Carb Recommendations:  Please follow a low carbohydrate, high protein diet    MyRADEUMPal is a good mick/computer program to keep food logs  Your meals should be less than 30 grams of carbohydrates  Your snacks should be less than 15 grams of carbohydrates  You should drink at least 64 ounces of water daily  You should walk at least 30 minutes daily  Get toe xray when you can  Check with insurance regarding DEXA scan, pneumonia vaccine  Mammogram due in July  Yearly visit recommended  Call for any questions or concerns  Wellness Visit for Adults   AMBULATORY CARE:   A wellness visit  is when you see your healthcare provider to get screened for health problems  Your healthcare provider will also give you advice on how to stay healthy  Write down your questions so you remember to ask them  Ask your healthcare provider how often you should have a wellness visit  What happens at a wellness visit:  Your healthcare provider will ask about your health, and your family history of health problems  This includes high blood pressure, heart disease, and cancer  He or she will ask if you have symptoms that concern you, if you smoke, and about your mood  You may also be asked about your intake of medicines, supplements, food, and alcohol  Any of the following may be done: Your weight  will be checked  Your height may also be checked so your body mass index (BMI) can be calculated  Your BMI shows if you are at a healthy weight  Your blood pressure  and heart rate will be checked  Your temperature may also be checked  Blood and urine tests  may be done  Blood tests may be done to check your cholesterol levels  Abnormal cholesterol levels increase your risk for heart disease and stroke  You may also need a blood or urine test to check for diabetes if you are at increased risk  Urine tests may be done to look for signs of an infection or kidney disease  A physical exam  includes checking your heartbeat and lungs with a stethoscope  Your healthcare provider may also check your skin to look for sun damage      Screening tests may be recommended  A screening test is done to check for diseases that may not cause symptoms  The screening tests you may need depend on your age, gender, family history, and lifestyle habits  For example, colorectal screening may be recommended if you are 48years old or older  Screening tests you need if you are a woman:   A Pap smear  is used to screen for cervical cancer  Pap smears are usually done every 3 to 5 years depending on your age  You may need them more often if you have had abnormal Pap smear test results in the past  Ask your healthcare provider how often you should have a Pap smear  A mammogram  is an x-ray of your breasts to screen for breast cancer  Experts recommend mammograms every 2 years starting at age 48 years  You may need a mammogram at age 52 years or younger if you have an increased risk for breast cancer  Talk to your healthcare provider about when you should start having mammograms and how often you need them  Vaccines you may need:   Get an influenza vaccine  every year  The influenza vaccine protects you from the flu  Several types of viruses cause the flu  The viruses change over time, so new vaccines are made each year  Get a tetanus-diphtheria (Td) booster vaccine  every 10 years  This vaccine protects you against tetanus and diphtheria  Tetanus is a severe infection that may cause painful muscle spasms and lockjaw  Diphtheria is a severe bacterial infection that causes a thick covering in the back of your mouth and throat  Get a human papillomavirus (HPV) vaccine  if you are female and aged 23 to 32 or male 23 to 24 and never received it  This vaccine protects you from HPV infection  HPV is the most common infection spread by sexual contact  HPV may also cause vaginal, penile, and anal cancers  Get a pneumococcal vaccine  if you are aged 72 years or older  The pneumococcal vaccine is an injection given to protect you from pneumococcal disease   Pneumococcal disease is an infection caused by pneumococcal bacteria  The infection may cause pneumonia, meningitis, or an ear infection  Get a shingles vaccine  if you are 60 or older, even if you have had shingles before  The shingles vaccine is an injection to protect you from the varicella-zoster virus  This is the same virus that causes chickenpox  Shingles is a painful rash that develops in people who had chickenpox or have been exposed to the virus  How to eat healthy:  My Plate is a model for planning healthy meals  It shows the types and amounts of foods that should go on your plate  Fruits and vegetables make up about half of your plate, and grains and protein make up the other half  A serving of dairy is included on the side of your plate  The amount of calories and serving sizes you need depends on your age, gender, weight, and height  Examples of healthy foods are listed below:  Eat a variety of vegetables  such as dark green, red, and orange vegetables  You can also include canned vegetables low in sodium (salt) and frozen vegetables without added butter or sauces  Eat a variety of fresh fruits , canned fruit in 100% juice, frozen fruit, and dried fruit  Include whole grains  At least half of the grains you eat should be whole grains  Examples include whole-wheat bread, wheat pasta, brown rice, and whole-grain cereals such as oatmeal     Eat a variety of protein foods such as seafood (fish and shellfish), lean meat, and poultry without skin (turkey and chicken)  Examples of lean meats include pork leg, shoulder, or tenderloin, and beef round, sirloin, tenderloin, and extra lean ground beef  Other protein foods include eggs and egg substitutes, beans, peas, soy products, nuts, and seeds  Choose low-fat dairy products such as skim or 1% milk or low-fat yogurt, cheese, and cottage cheese  Limit unhealthy fats  such as butter, hard margarine, and shortening         Exercise:  Exercise at least 30 minutes per day on most days of the week  Some examples of exercise include walking, biking, dancing, and swimming  You can also fit in more physical activity by taking the stairs instead of the elevator or parking farther away from stores  Include muscle strengthening activities 2 days each week  Regular exercise provides many health benefits  It helps you manage your weight, and decreases your risk for type 2 diabetes, heart disease, stroke, and high blood pressure  Exercise can also help improve your mood  Ask your healthcare provider about the best exercise plan for you  General health and safety guidelines:   Do not smoke  Nicotine and other chemicals in cigarettes and cigars can cause lung damage  Ask your healthcare provider for information if you currently smoke and need help to quit  E-cigarettes or smokeless tobacco still contain nicotine  Talk to your healthcare provider before you use these products  Limit alcohol  A drink of alcohol is 12 ounces of beer, 5 ounces of wine, or 1½ ounces of liquor  Lose weight, if needed  Being overweight increases your risk of certain health conditions  These include heart disease, high blood pressure, type 2 diabetes, and certain types of cancer  Protect your skin  Do not sunbathe or use tanning beds  Use sunscreen with a SPF 15 or higher  Apply sunscreen at least 15 minutes before you go outside  Reapply sunscreen every 2 hours  Wear protective clothing, hats, and sunglasses when you are outside  Drive safely  Always wear your seatbelt  Make sure everyone in your car wears a seatbelt  A seatbelt can save your life if you are in an accident  Do not use your cell phone when you are driving  This could distract you and cause an accident  Pull over if you need to make a call or send a text message  Practice safe sex  Use latex condoms if are sexually active and have more than one partner   Your healthcare provider may recommend screening tests for sexually transmitted infections (STIs)  Wear helmets, lifejackets, and protective gear  Always wear a helmet when you ride a bike or motorcycle, go skiing, or play sports that could cause a head injury  Wear protective equipment when you play sports  Wear a lifejacket when you are on a boat or doing water sports  © Copyright [employer] 2022 Information is for End User's use only and may not be sold, redistributed or otherwise used for commercial purposes  All illustrations and images included in CareNotes® are the copyrighted property of [employer]  or [address] [place], [place] [address]mendel Roberts   The above information is an  only  It is not intended as medical advice for individual conditions or treatments  Talk to your doctor, nurse or pharmacist before following any medical regimen to see if it is safe and effective for you

## 2022-06-09 NOTE — TELEPHONE ENCOUNTER
Upon review of the In Basket request we were able to locate, review, and update the patient chart as requested for Mammogram     Any additional questions or concerns should be emailed to the Practice Liaisons via [email or web address] email, please do not reply via In Basket      Thank you  Vincent Garcia MA

## 2022-06-09 NOTE — TELEPHONE ENCOUNTER
----- Message from Parth Putnam sent at 6/8/2022  3:53 PM EDT -----  Regarding: care gap request  06/08/22 3:53 PM    Hello, our patient attached above has had Mammogram completed/performed  Please assist in updating the patient chart by pulling the Care Everywhere (CE) document  The date of service is 07/09/2021       Thank you,  Grzegorz Gary MA   [address]

## 2022-06-15 ENCOUNTER — APPOINTMENT (OUTPATIENT)
Dept: RADIOLOGY | Facility: MEDICAL CENTER | Age: 62
End: 2022-06-15
Payer: COMMERCIAL

## 2022-06-15 DIAGNOSIS — M79.675 PAIN OF TOE OF LEFT FOOT: ICD-10-CM

## 2022-06-15 PROCEDURE — 73630 X-RAY EXAM OF FOOT: CPT

## 2022-06-16 ENCOUNTER — TELEPHONE (OUTPATIENT)
Dept: FAMILY MEDICINE CLINIC | Facility: CLINIC | Age: 62
End: 2022-06-16

## 2022-06-16 ENCOUNTER — LAB (OUTPATIENT)
Dept: LAB | Facility: CLINIC | Age: 62
End: 2022-06-16
Payer: COMMERCIAL

## 2022-06-16 DIAGNOSIS — R79.89 ELEVATED LFTS: ICD-10-CM

## 2022-06-16 DIAGNOSIS — M79.675 PAIN OF TOE OF LEFT FOOT: ICD-10-CM

## 2022-06-16 DIAGNOSIS — Z00.00 ANNUAL PHYSICAL EXAM: ICD-10-CM

## 2022-06-16 DIAGNOSIS — Z12.31 ENCOUNTER FOR SCREENING MAMMOGRAM FOR BREAST CANCER: ICD-10-CM

## 2022-06-16 DIAGNOSIS — Z13.6 SCREENING FOR CARDIOVASCULAR CONDITION: ICD-10-CM

## 2022-06-16 DIAGNOSIS — M25.50 ARTHRALGIA, UNSPECIFIED JOINT: ICD-10-CM

## 2022-06-16 DIAGNOSIS — H33.309: ICD-10-CM

## 2022-06-16 DIAGNOSIS — L40.9 PSORIASIS: ICD-10-CM

## 2022-06-16 DIAGNOSIS — D56.1 BETA-THALASSEMIA (HCC): ICD-10-CM

## 2022-06-16 LAB
ALBUMIN SERPL BCP-MCNC: 3.6 G/DL (ref 3.5–5)
ALP SERPL-CCNC: 43 U/L (ref 46–116)
ALT SERPL W P-5'-P-CCNC: 31 U/L (ref 12–78)
ANION GAP SERPL CALCULATED.3IONS-SCNC: 1 MMOL/L (ref 4–13)
AST SERPL W P-5'-P-CCNC: 28 U/L (ref 5–45)
BILIRUB SERPL-MCNC: 0.7 MG/DL (ref 0.2–1)
BUN SERPL-MCNC: 18 MG/DL (ref 5–25)
CALCIUM SERPL-MCNC: 8.8 MG/DL (ref 8.3–10.1)
CHLORIDE SERPL-SCNC: 110 MMOL/L (ref 100–108)
CHOLEST SERPL-MCNC: 160 MG/DL
CO2 SERPL-SCNC: 29 MMOL/L (ref 21–32)
CREAT SERPL-MCNC: 0.89 MG/DL (ref 0.6–1.3)
CRP SERPL QL: <3 MG/L
ERYTHROCYTE [DISTWIDTH] IN BLOOD BY AUTOMATED COUNT: 17.7 % (ref 11.6–15.1)
ERYTHROCYTE [SEDIMENTATION RATE] IN BLOOD: 24 MM/HOUR (ref 0–29)
EST. AVERAGE GLUCOSE BLD GHB EST-MCNC: 97 MG/DL
GFR SERPL CREATININE-BSD FRML MDRD: 69 ML/MIN/1.73SQ M
GLUCOSE P FAST SERPL-MCNC: 88 MG/DL (ref 65–99)
HBA1C MFR BLD: 5 %
HCT VFR BLD AUTO: 33.5 % (ref 34.8–46.1)
HDLC SERPL-MCNC: 78 MG/DL
HGB BLD-MCNC: 10.2 G/DL (ref 11.5–15.4)
LDLC SERPL CALC-MCNC: 69 MG/DL (ref 0–100)
MCH RBC QN AUTO: 18.9 PG (ref 26.8–34.3)
MCHC RBC AUTO-ENTMCNC: 30.4 G/DL (ref 31.4–37.4)
MCV RBC AUTO: 62 FL (ref 82–98)
NONHDLC SERPL-MCNC: 82 MG/DL
PLATELET # BLD AUTO: 222 THOUSANDS/UL (ref 149–390)
PMV BLD AUTO: 10.9 FL (ref 8.9–12.7)
POTASSIUM SERPL-SCNC: 4.1 MMOL/L (ref 3.5–5.3)
PROT SERPL-MCNC: 7.4 G/DL (ref 6.4–8.2)
RBC # BLD AUTO: 5.39 MILLION/UL (ref 3.81–5.12)
RHEUMATOID FACT SER QL LA: NEGATIVE
SODIUM SERPL-SCNC: 140 MMOL/L (ref 136–145)
TRIGL SERPL-MCNC: 66 MG/DL
TSH SERPL DL<=0.05 MIU/L-ACNC: 3.34 UIU/ML (ref 0.45–4.5)
URATE SERPL-MCNC: 4 MG/DL (ref 2–6.8)
WBC # BLD AUTO: 5.1 THOUSAND/UL (ref 4.31–10.16)

## 2022-06-16 PROCEDURE — 86430 RHEUMATOID FACTOR TEST QUAL: CPT

## 2022-06-16 PROCEDURE — 85652 RBC SED RATE AUTOMATED: CPT

## 2022-06-16 PROCEDURE — 84550 ASSAY OF BLOOD/URIC ACID: CPT

## 2022-06-16 PROCEDURE — 80061 LIPID PANEL: CPT

## 2022-06-16 PROCEDURE — 80053 COMPREHEN METABOLIC PANEL: CPT

## 2022-06-16 PROCEDURE — 83036 HEMOGLOBIN GLYCOSYLATED A1C: CPT

## 2022-06-16 PROCEDURE — 86140 C-REACTIVE PROTEIN: CPT

## 2022-06-16 PROCEDURE — 86038 ANTINUCLEAR ANTIBODIES: CPT

## 2022-06-16 PROCEDURE — 85027 COMPLETE CBC AUTOMATED: CPT

## 2022-06-16 PROCEDURE — 84443 ASSAY THYROID STIM HORMONE: CPT

## 2022-06-16 PROCEDURE — 36415 COLL VENOUS BLD VENIPUNCTURE: CPT

## 2022-06-16 PROCEDURE — 3044F HG A1C LEVEL LT 7.0%: CPT | Performed by: FAMILY MEDICINE

## 2022-06-16 PROCEDURE — 86200 CCP ANTIBODY: CPT

## 2022-06-16 NOTE — PROGRESS NOTES
Subjective:    HPI  Eulalia Dawson is a 59 y o  female here today for:  Chief Complaint   Patient presents with    Physical Exam    Toe Pain     Got hit with a softball on her right foot and her toe looks different and she is having right  knee pain started two days ago          ---Above per clinical staff & reviewed  ---  HPI:  54yof here for well exam  Pt also has some issues to discuss  Due for mammogram and DEXA- had one in 2013 for family history of bone loss- recommended she check with insurance regarding DEXA scan  Has been seen in past by dermatology and had biopsy of area on eyelid- diagnosed with psoriasis  Is wondering if joint pain was possibly related to psoriasis  Discussed that I would look into it and order appropriate labs  Hit in foot with softball awhile ago, still has issue with toe discoloration- pic of toe at time of incident showed what looked like hematoma right at the base of the nail  Will get xray of foot to rule out fracture  PHQ-2/9 Depression Screening    Little interest or pleasure in doing things: 0 - not at all  Feeling down, depressed, or hopeless: 0 - not at all  PHQ-2 Score: 0  PHQ-2 Interpretation: Negative depression screen           The following portions of the patient's history were reviewed and updated as appropriate: allergies, current medications, past family history, past medical history, past social history, past surgical history and problem list     History reviewed  No pertinent past medical history      Past Surgical History:   Procedure Laterality Date    EYE SURGERY      FINGER SURGERY      KNEE ARTHROSCOPY      WISDOM TOOTH EXTRACTION         Social History     Socioeconomic History    Marital status: /Civil Union     Spouse name: Kenton Allen Number of children: 2    Years of education: masters    Highest education level: Master's degree (e g , MA, MS, Reed, MEd, MSW, NATHALY)   Occupational History    Occupation:      Employer: [employer]   Tobacco Use    Smoking status: Never Smoker    Smokeless tobacco: Never Used   Vaping Use    Vaping Use: Never used   Substance and Sexual Activity    Alcohol use: Yes     Comment: socially    Drug use: Never     Comment: No drug use - As per [employer]     Sexual activity: Yes     Partners: Male   Other Topics Concern    None   Social History Narrative    None     Social Determinants of Health     Financial Resource Strain: Not on file   Food Insecurity: Not on file   Transportation Needs: Not on file   Physical Activity: Not on file   Stress: Not on file   Social Connections: Not on file   Intimate Partner Violence: Not on file   Housing Stability: Not on file       Current Outpatient Medications   Medication Sig Dispense Refill    clobetasol (TEMOVATE) 0 05 % external solution clobetasol 0 05 % scalp solution      dextran 70-hypromellose (Artificial Tears) 0 1-0 3 % ophthalmic solution Apply 1 drop to eye      Nutritional Supplements (VITAMIN D BOOSTER PO) Take by mouth       No current facility-administered medications for this visit  Review of Systems   Constitutional: Negative  Negative for chills and fever  HENT: Negative  Negative for ear pain and sore throat  Eyes: Negative for pain and visual disturbance  Respiratory: Negative  Negative for cough and shortness of breath  Cardiovascular: Negative  Negative for chest pain and palpitations  Gastrointestinal: Negative  Negative for abdominal pain and vomiting  Genitourinary: Negative  Negative for dysuria and hematuria  Musculoskeletal: Positive for joint swelling  Negative for arthralgias and back pain  Skin: Positive for rash  Negative for color change  Neurological: Negative  Negative for seizures and syncope  Psychiatric/Behavioral: Negative           Objective:    /62 (BP Location: Left arm, Patient Position: Sitting, Cuff Size: Adult)   Pulse 60   Temp (!) 96 7 °F (35 9 °C) (Temporal)   Ht 5' 2 99" (1 6 m)   Wt 66 8 kg (147 lb 3 2 oz)   SpO2 98%   BMI 26 08 kg/m²   Wt Readings from Last 3 Encounters:   06/09/22 66 8 kg (147 lb 3 2 oz)   06/01/21 63 kg (138 lb 14 4 oz)   07/14/20 66 5 kg (146 lb 11 2 oz)     BP Readings from Last 3 Encounters:   06/09/22 102/62   06/01/21 90/60   11/11/20 (!) 84/52       Lab Results   Component Value Date    WBC 5 43 06/10/2021    HGB 10 8 (L) 06/10/2021    HCT 37 1 06/10/2021     06/10/2021    TRIG 66 06/10/2021    HDL 82 06/10/2021    ALT 39 06/10/2021    AST 30 06/10/2021    K 4 6 06/10/2021     06/10/2021    CREATININE 0 88 06/10/2021    BUN 18 06/10/2021    CO2 30 06/10/2021    GLUF 81 06/10/2021       Physical Exam  Vitals and nursing note reviewed  Constitutional:       Appearance: Normal appearance  She is well-developed  HENT:      Head: Normocephalic and atraumatic  Right Ear: External ear normal       Left Ear: External ear normal       Nose: Nose normal       Mouth/Throat:      Mouth: Mucous membranes are moist    Eyes:      Conjunctiva/sclera: Conjunctivae normal       Pupils: Pupils are equal, round, and reactive to light  Neck:      Thyroid: No thyromegaly  Comments: No carotid bruits  Cardiovascular:      Rate and Rhythm: Normal rate and regular rhythm  Heart sounds: Normal heart sounds  No murmur heard  Pulmonary:      Effort: Pulmonary effort is normal  No respiratory distress  Breath sounds: Normal breath sounds  Abdominal:      General: Bowel sounds are normal  There is no distension  Palpations: Abdomen is soft  Tenderness: There is no abdominal tenderness  Musculoskeletal:         General: Normal range of motion  Cervical back: Normal range of motion and neck supple  Comments: Toe with slight discoloration at base of cuticle   Skin:     General: Skin is warm  Capillary Refill: Capillary refill takes less than 2 seconds     Neurological:      Mental Status: She is alert and oriented to person, place, and time  Cranial Nerves: No cranial nerve deficit  Psychiatric:         Mood and Affect: Mood normal          Thought Content: Thought content normal                BMI Counseling: Body mass index is 26 08 kg/m²  The BMI is above normal  Nutrition recommendations include moderation in carbohydrate intake and increasing intake of lean protein  Exercise recommendations include moderate physical activity 150 minutes/week  No pharmacotherapy was ordered  Patient referred to PCP  Rationale for BMI follow-up plan is due to patient being overweight or obese  Depression Screening and Follow-up Plan: Patient was screened for depression during today's encounter  They screened negative with a PHQ-2 score of 0  Assessment/Plan:   Jayne Mcclain was seen today for physical exam and toe pain  Diagnoses and all orders for this visit:    Annual physical exam  -     Lipid panel; Future  -     Hemoglobin A1C; Future  -     CBC and Platelet; Future  -     Comprehensive metabolic panel; Future  -     TSH, 3rd generation with Free T4 reflex; Future    Beta-thalassemia ([facility] [address] )  -     Lipid panel; Future  -     Hemoglobin A1C; Future  -     CBC and Platelet; Future  -     Comprehensive metabolic panel; Future  -     TSH, 3rd generation with Free T4 reflex; Future    Tear of retina without detachment, unspecified laterality  -     Lipid panel; Future  -     Hemoglobin A1C; Future  -     CBC and Platelet; Future  -     Comprehensive metabolic panel; Future  -     TSH, 3rd generation with Free T4 reflex; Future  -     Uric acid; Future  -     Sedimentation rate, automated; Future  -     C-reactive protein; Future  -     RF Screen w/ Reflex to Titer; Future  -     Cyclic citrul peptide antibody, IgG; Future  -     Antinuclear Antibodies (AIME), IFA; Future    Elevated LFTs  -     Lipid panel; Future  -     Hemoglobin A1C; Future  -     CBC and Platelet;  Future  -     Comprehensive metabolic panel; Future  -     TSH, 3rd generation with Free T4 reflex; Future    Encounter for screening mammogram for breast cancer  -     Mammo screening bilateral w 3d & cad; Future  -     Lipid panel; Future  -     Hemoglobin A1C; Future  -     CBC and Platelet; Future  -     Comprehensive metabolic panel; Future  -     TSH, 3rd generation with Free T4 reflex; Future    Screening for cardiovascular condition  -     Lipid panel; Future  -     Hemoglobin A1C; Future  -     CBC and Platelet; Future  -     Comprehensive metabolic panel; Future  -     TSH, 3rd generation with Free T4 reflex; Future    Asymptomatic postmenopausal state  -     DXA bone density spine hip and pelvis; Future    Screening for osteoporosis  -     DXA bone density spine hip and pelvis; Future    Pain of toe of left foot  -     XR foot 3+ vw left; Future  -     Uric acid; Future  -     Sedimentation rate, automated; Future  -     C-reactive protein; Future  -     RF Screen w/ Reflex to Titer; Future  -     Cyclic citrul peptide antibody, IgG; Future  -     Antinuclear Antibodies (AIME), IFA; Future    BMI 26 0-26 9,adult    Psoriasis  -     Uric acid; Future  -     Sedimentation rate, automated; Future  -     C-reactive protein; Future  -     RF Screen w/ Reflex to Titer; Future  -     Cyclic citrul peptide antibody, IgG; Future  -     Antinuclear Antibodies (AIME), IFA; Future    Arthralgia, unspecified joint  -     Uric acid; Future  -     Sedimentation rate, automated; Future  -     C-reactive protein; Future  -     RF Screen w/ Reflex to Titer; Future  -     Cyclic citrul peptide antibody, IgG; Future  -     Antinuclear Antibodies (AIME), IFA; Future      Return in about 1 year (around 6/9/2023) for Annual physical   Patient Instructions     Please get your labwork done fasting  Do not eat/drink for about 8-12 hours prior to getting the labwork done, however you may drink water or black coffee while you are fasting    Please use a [employer] [employer]s lab if possible, as we receive the lab results more quickly  We will notify you of your labwork results even if they are normal   Please contact us if you do not hear about your lab results after one week  Low Carb Recommendations:  Please follow a low carbohydrate, high protein diet  [employer] is a good mick/computer program to keep food logs  Your meals should be less than 30 grams of carbohydrates  Your snacks should be less than 15 grams of carbohydrates  You should drink at least 64 ounces of water daily  You should walk at least 30 minutes daily  Get toe xray when you can  Check with insurance regarding DEXA scan, pneumonia vaccine  Mammogram due in July  Yearly visit recommended  Call for any questions or concerns  Wellness Visit for Adults   AMBULATORY CARE:   A wellness visit  is when you see your healthcare provider to get screened for health problems  Your healthcare provider will also give you advice on how to stay healthy  Write down your questions so you remember to ask them  Ask your healthcare provider how often you should have a wellness visit  What happens at a wellness visit:  Your healthcare provider will ask about your health, and your family history of health problems  This includes high blood pressure, heart disease, and cancer  He or she will ask if you have symptoms that concern you, if you smoke, and about your mood  You may also be asked about your intake of medicines, supplements, food, and alcohol  Any of the following may be done:  · Your weight  will be checked  Your height may also be checked so your body mass index (BMI) can be calculated  Your BMI shows if you are at a healthy weight  · Your blood pressure  and heart rate will be checked  Your temperature may also be checked  · Blood and urine tests  may be done  Blood tests may be done to check your cholesterol levels  Abnormal cholesterol levels increase your risk for heart disease and stroke   You may also need a blood or urine test to check for diabetes if you are at increased risk  Urine tests may be done to look for signs of an infection or kidney disease  · A physical exam  includes checking your heartbeat and lungs with a stethoscope  Your healthcare provider may also check your skin to look for sun damage  · Screening tests  may be recommended  A screening test is done to check for diseases that may not cause symptoms  The screening tests you may need depend on your age, gender, family history, and lifestyle habits  For example, colorectal screening may be recommended if you are 48years old or older  Screening tests you need if you are a woman:   · A Pap smear  is used to screen for cervical cancer  Pap smears are usually done every 3 to 5 years depending on your age  You may need them more often if you have had abnormal Pap smear test results in the past  Ask your healthcare provider how often you should have a Pap smear  · A mammogram  is an x-ray of your breasts to screen for breast cancer  Experts recommend mammograms every 2 years starting at age 48 years  You may need a mammogram at age 52 years or younger if you have an increased risk for breast cancer  Talk to your healthcare provider about when you should start having mammograms and how often you need them  Vaccines you may need:   · Get an influenza vaccine  every year  The influenza vaccine protects you from the flu  Several types of viruses cause the flu  The viruses change over time, so new vaccines are made each year  · Get a tetanus-diphtheria (Td) booster vaccine  every 10 years  This vaccine protects you against tetanus and diphtheria  Tetanus is a severe infection that may cause painful muscle spasms and lockjaw  Diphtheria is a severe bacterial infection that causes a thick covering in the back of your mouth and throat      · Get a human papillomavirus (HPV) vaccine  if you are female and aged 23 to 32 or male 23 to 24 and never received it  This vaccine protects you from HPV infection  HPV is the most common infection spread by sexual contact  HPV may also cause vaginal, penile, and anal cancers  · Get a pneumococcal vaccine  if you are aged 72 years or older  The pneumococcal vaccine is an injection given to protect you from pneumococcal disease  Pneumococcal disease is an infection caused by pneumococcal bacteria  The infection may cause pneumonia, meningitis, or an ear infection  · Get a shingles vaccine  if you are 60 or older, even if you have had shingles before  The shingles vaccine is an injection to protect you from the varicella-zoster virus  This is the same virus that causes chickenpox  Shingles is a painful rash that develops in people who had chickenpox or have been exposed to the virus  How to eat healthy:  My Plate is a model for planning healthy meals  It shows the types and amounts of foods that should go on your plate  Fruits and vegetables make up about half of your plate, and grains and protein make up the other half  A serving of dairy is included on the side of your plate  The amount of calories and serving sizes you need depends on your age, gender, weight, and height  Examples of healthy foods are listed below:  · Eat a variety of vegetables  such as dark green, red, and orange vegetables  You can also include canned vegetables low in sodium (salt) and frozen vegetables without added butter or sauces  · Eat a variety of fresh fruits , canned fruit in 100% juice, frozen fruit, and dried fruit  · Include whole grains  At least half of the grains you eat should be whole grains  Examples include whole-wheat bread, wheat pasta, brown rice, and whole-grain cereals such as oatmeal     · Eat a variety of protein foods such as seafood (fish and shellfish), lean meat, and poultry without skin (turkey and chicken)   Examples of lean meats include pork leg, shoulder, or tenderloin, and beef round, sirloin, tenderloin, and extra lean ground beef  Other protein foods include eggs and egg substitutes, beans, peas, soy products, nuts, and seeds  · Choose low-fat dairy products such as skim or 1% milk or low-fat yogurt, cheese, and cottage cheese  · Limit unhealthy fats  such as butter, hard margarine, and shortening  Exercise:  Exercise at least 30 minutes per day on most days of the week  Some examples of exercise include walking, biking, dancing, and swimming  You can also fit in more physical activity by taking the stairs instead of the elevator or parking farther away from stores  Include muscle strengthening activities 2 days each week  Regular exercise provides many health benefits  It helps you manage your weight, and decreases your risk for type 2 diabetes, heart disease, stroke, and high blood pressure  Exercise can also help improve your mood  Ask your healthcare provider about the best exercise plan for you  General health and safety guidelines:   · Do not smoke  Nicotine and other chemicals in cigarettes and cigars can cause lung damage  Ask your healthcare provider for information if you currently smoke and need help to quit  E-cigarettes or smokeless tobacco still contain nicotine  Talk to your healthcare provider before you use these products  · Limit alcohol  A drink of alcohol is 12 ounces of beer, 5 ounces of wine, or 1½ ounces of liquor  · Lose weight, if needed  Being overweight increases your risk of certain health conditions  These include heart disease, high blood pressure, type 2 diabetes, and certain types of cancer  · Protect your skin  Do not sunbathe or use tanning beds  Use sunscreen with a SPF 15 or higher  Apply sunscreen at least 15 minutes before you go outside  Reapply sunscreen every 2 hours  Wear protective clothing, hats, and sunglasses when you are outside  · Drive safely  Always wear your seatbelt   Make sure everyone in your car wears a seatbelt  A seatbelt can save your life if you are in an accident  Do not use your cell phone when you are driving  This could distract you and cause an accident  Pull over if you need to make a call or send a text message  · Practice safe sex  Use latex condoms if are sexually active and have more than one partner  Your healthcare provider may recommend screening tests for sexually transmitted infections (STIs)  · Wear helmets, lifejackets, and protective gear  Always wear a helmet when you ride a bike or motorcycle, go skiing, or play sports that could cause a head injury  Wear protective equipment when you play sports  Wear a lifejacket when you are on a boat or doing water sports  © Copyright [employer] 2022 Information is for End User's use only and may not be sold, redistributed or otherwise used for commercial purposes  All illustrations and images included in CareNotes® are the copyrighted property of A D A M [employer] Inc  or [facility] Jarvis Roberts   The above information is an  only  It is not intended as medical advice for individual conditions or treatments  Talk to your doctor, nurse or pharmacist before following any medical regimen to see if it is safe and effective for you

## 2022-06-16 NOTE — TELEPHONE ENCOUNTER
Please let pt know there is a acute/subacute of her toe  I would recommends she see podiatry to see what type of treatment is recommended at this point since it is several weeks out from her injury

## 2022-06-16 NOTE — TELEPHONE ENCOUNTER
L Foot significant findings, review report from 6/15/22  If any questions please call reading/signing radiologist on report

## 2022-06-17 LAB
ANA NUCLEOLAR TITR SER: ABNORMAL {TITER}
ANA TITR SER IF: POSITIVE {TITER}
CCP AB SER IA-ACNC: 1.5
SL AMB NOTE:: ABNORMAL

## 2022-06-23 DIAGNOSIS — H33.309: ICD-10-CM

## 2022-06-23 DIAGNOSIS — M25.50 ARTHRALGIA, UNSPECIFIED JOINT: ICD-10-CM

## 2022-06-23 DIAGNOSIS — R76.8 ANA POSITIVE: Primary | ICD-10-CM

## 2022-06-23 DIAGNOSIS — L40.9 PSORIASIS: ICD-10-CM

## 2022-08-05 ENCOUNTER — HOSPITAL ENCOUNTER (OUTPATIENT)
Dept: BONE DENSITY | Facility: MEDICAL CENTER | Age: 62
Discharge: HOME | End: 2022-08-05
Payer: COMMERCIAL

## 2022-08-05 DIAGNOSIS — Z13.820 SCREENING FOR OSTEOPOROSIS: ICD-10-CM

## 2022-08-05 DIAGNOSIS — Z78.0 ASYMPTOMATIC POSTMENOPAUSAL STATE: ICD-10-CM

## 2022-08-05 PROCEDURE — 77080 DXA BONE DENSITY AXIAL: CPT

## 2022-10-12 PROBLEM — H16.229 KERATOCONJUNCTIVITIS SICCA NOT DUE TO SJOGREN'S SYNDROME: Status: RESOLVED | Noted: 2020-07-13 | Resolved: 2022-10-12

## 2022-12-19 ENCOUNTER — APPOINTMENT (OUTPATIENT)
Dept: LAB | Facility: CLINIC | Age: 62
End: 2022-12-19

## 2022-12-19 DIAGNOSIS — H16.223 KERATOCONJUNCTIVITIS SICCA OF BOTH EYES NOT DUE TO SJOGREN'S SYNDROME: ICD-10-CM

## 2022-12-19 DIAGNOSIS — E55.9 VITAMIN D DEFICIENCY DISEASE: ICD-10-CM

## 2022-12-20 LAB
ENA SS-A AB SER-ACNC: <0.2 AI (ref 0–0.9)
ENA SS-B AB SER-ACNC: <0.2 AI (ref 0–0.9)

## 2023-06-05 ENCOUNTER — RA CDI HCC (OUTPATIENT)
Dept: OTHER | Facility: HOSPITAL | Age: 63
End: 2023-06-05

## 2023-06-05 NOTE — PROGRESS NOTES
[facility]  coding opportunities       Chart reviewed, no opportunity found: CHART REVIEWED, NO OPPORTUNITY FOUND        Patients Insurance        Commercial Insurance: [address] [place] [address]

## 2023-06-20 ENCOUNTER — OFFICE VISIT (OUTPATIENT)
Dept: FAMILY MEDICINE CLINIC | Facility: CLINIC | Age: 63
End: 2023-06-20
Payer: COMMERCIAL

## 2023-06-20 VITALS
TEMPERATURE: 97.3 F | HEIGHT: 63 IN | BODY MASS INDEX: 25.91 KG/M2 | DIASTOLIC BLOOD PRESSURE: 70 MMHG | WEIGHT: 146.2 LBS | HEART RATE: 55 BPM | OXYGEN SATURATION: 98 % | SYSTOLIC BLOOD PRESSURE: 122 MMHG

## 2023-06-20 DIAGNOSIS — H25.13 AGE-RELATED NUCLEAR CATARACT OF BOTH EYES: ICD-10-CM

## 2023-06-20 DIAGNOSIS — D56.1 BETA-THALASSEMIA (HCC): ICD-10-CM

## 2023-06-20 DIAGNOSIS — Z13.6 SCREENING FOR CARDIOVASCULAR CONDITION: ICD-10-CM

## 2023-06-20 DIAGNOSIS — Z13.29 SCREENING FOR THYROID DISORDER: ICD-10-CM

## 2023-06-20 DIAGNOSIS — Z00.00 ANNUAL PHYSICAL EXAM: Primary | ICD-10-CM

## 2023-06-20 DIAGNOSIS — H04.123 DRY EYES: ICD-10-CM

## 2023-06-20 PROCEDURE — 99396 PREV VISIT EST AGE 40-64: CPT | Performed by: FAMILY MEDICINE

## 2023-06-20 NOTE — PROGRESS NOTES
Subjective:    HPI  Larry Ortega is a 57 y o  female here today for:  Chief Complaint   Patient presents with   • Physical Exam          ---Above per clinical staff & reviewed  ---  HPI:  [id number] here for well exam  Pt doing well  Has seen dermatology, rheumatology  Had PAP smear 2022 at [address] has area of dryness on eyelids- tried calling dermatology for another cream, has not had callback  Had used Eucrisa but it is $700- used cream that daughter had for eczema and this worked  Due for labs  Declines HIV and Hep C screening    PHQ-2/9 Depression Screening    Little interest or pleasure in doing things: 0 - not at all  Feeling down, depressed, or hopeless: 0 - not at all  Trouble falling or staying asleep, or sleeping too much: 0 - not at all  Feeling tired or having little energy: 0 - not at all  Poor appetite or overeatin - not at all  Feeling bad about yourself - or that you are a failure or have let yourself or your family down: 0 - not at all  Trouble concentrating on things, such as reading the newspaper or watching television: 0 - not at all  Moving or speaking so slowly that other people could have noticed  Or the opposite - being so fidgety or restless that you have been moving around a lot more than usual: 0 - not at all  Thoughts that you would be better off dead, or of hurting yourself in some way: 0 - not at all  PHQ-2 Score: 0  PHQ-2 Interpretation: Negative depression screen  PHQ-9 Score: 0   PHQ-9 Interpretation: No or Minimal depression        LINDA-7 Flowsheet Screening    Flowsheet Row Most Recent Value   Over the last 2 weeks, how often have you been bothered by any of the following problems?     Feeling nervous, anxious, or on edge 0   Not being able to stop or control worrying 0   Worrying too much about different things 0   Trouble relaxing 0   Being so restless that it is hard to sit still 0   Becoming easily annoyed or irritable 0   Feeling afraid as if something awful might happen 0   LINDA-7 Total Score 0                  The following portions of the patient's history were reviewed and updated as appropriate: allergies, current medications, past family history, past medical history, past social history, past surgical history and problem list     History reviewed  No pertinent past medical history  Past Surgical History:   Procedure Laterality Date   • EYE SURGERY     • FINGER SURGERY     • KNEE ARTHROSCOPY     • WISDOM TOOTH EXTRACTION         Social History     Socioeconomic History   • Marital status: /Civil Union     Spouse name: Linda Hernandez   • Number of children: 2   • Years of education: masters   • Highest education level: Master's degree (e g , MA, MS, Reed, MEd, MSW, NATHALY)   Occupational History   • Occupation:      Employer: [employer]    Tobacco Use   • Smoking status: Never   • Smokeless tobacco: Never   Vaping Use   • Vaping Use: Never used   Substance and Sexual Activity   • Alcohol use: Yes     Comment: socially   • Drug use: Never     Comment: No drug use - As per [employer]    • Sexual activity: Yes     Partners: Male   Other Topics Concern   • None   Social History Narrative   • None     Social Determinants of Health     Financial Resource Strain: Low Risk  (6/1/2021)    Overall Financial Resource Strain (CARDIA)    • Difficulty of Paying Living Expenses: Not hard at all   Food Insecurity: No Food Insecurity (6/1/2021)    Hunger Vital Sign    • Worried About Running Out of Food in the Last Year: Never true    • Ran Out of Food in the Last Year: Never true   Transportation Needs: No Transportation Needs (6/1/2021)    PRAPARE - Transportation    • Lack of Transportation (Medical): No    • Lack of Transportation (Non-Medical):  No   Physical Activity: Unknown (5/26/2020)    Exercise Vital Sign    • Days of Exercise per Week: 4 days    • Minutes of Exercise per Session: Not asked   Stress: No Stress Concern Present (5/26/2020)    [employer] of Occupational Health - Occupational Stress Questionnaire    • Feeling of Stress : Not at all   Social Connections: Unknown (5/26/2020)    Social Connection and Isolation Panel [NHANES]    • Frequency of Communication with Friends and Family: Not on file    • Frequency of Social Gatherings with Friends and Family: Not on file    • Attends Protestant Services: Never    • Active Member of Clubs or Organizations: Yes    • Attends Club or Organization Meetings: Not on file    • Marital Status:    Intimate Partner Violence: Not At Risk (5/26/2020)    Humiliation, Afraid, Rape, and Kick questionnaire    • Fear of Current or Ex-Partner: No    • Emotionally Abused: No    • Physically Abused: No    • Sexually Abused: No   Housing Stability: Not on file       Current Outpatient Medications   Medication Sig Dispense Refill   • clobetasol (TEMOVATE) 0 05 % external solution clobetasol 0 05 % scalp solution     • dextran 70-hypromellose (Artificial Tears) 0 1-0 3 % ophthalmic solution Apply 1 drop to eye     • Nutritional Supplements (VITAMIN D BOOSTER PO) Take by mouth       No current facility-administered medications for this visit  Review of Systems   Constitutional: Negative  Negative for chills and fever  HENT: Negative  Negative for ear pain and sore throat  Eyes: Negative for pain and visual disturbance  Respiratory: Negative  Negative for cough and shortness of breath  Cardiovascular: Negative  Negative for chest pain and palpitations  Gastrointestinal: Negative  Negative for abdominal pain and vomiting  Genitourinary: Negative  Negative for dysuria and hematuria  Musculoskeletal: Negative for arthralgias and back pain  Skin: Negative for color change and rash  Neurological: Negative  Negative for seizures and syncope  Psychiatric/Behavioral: Negative           Objective:    /70 (BP Location: Left arm, Patient Position: Sitting, Cuff Size: Adult)   Pulse 55   Temp (!) 97 3 "°F (36 3 °C) (Temporal)   Ht 5' 3\" (1 6 m)   Wt 66 3 kg (146 lb 3 2 oz)   SpO2 98%   BMI 25 90 kg/m²   Wt Readings from Last 3 Encounters:   06/20/23 66 3 kg (146 lb 3 2 oz)   06/09/22 66 8 kg (147 lb 3 2 oz)   06/01/21 63 kg (138 lb 14 4 oz)     BP Readings from Last 3 Encounters:   06/20/23 122/70   06/09/22 102/62   06/01/21 90/60       Lab Results   Component Value Date    WBC 5 10 06/16/2022    HGB 10 2 (L) 06/16/2022    HCT 33 5 (L) 06/16/2022     06/16/2022    TRIG 66 06/16/2022    HDL 78 06/16/2022    ALT 31 06/16/2022    AST 28 06/16/2022    K 4 1 06/16/2022     (H) 06/16/2022    CREATININE 0 89 06/16/2022    BUN 18 06/16/2022    CO2 29 06/16/2022    GLUF 88 06/16/2022    HGBA1C 5 0 06/16/2022       Physical Exam  Vitals and nursing note reviewed  Constitutional:       Appearance: Normal appearance  She is well-developed  HENT:      Head: Normocephalic and atraumatic  Right Ear: Tympanic membrane and external ear normal       Left Ear: Tympanic membrane and external ear normal       Nose: Nose normal       Mouth/Throat:      Mouth: Mucous membranes are moist    Eyes:      Conjunctiva/sclera: Conjunctivae normal       Pupils: Pupils are equal, round, and reactive to light  Neck:      Thyroid: No thyromegaly  Comments: No carotid bruits  Cardiovascular:      Rate and Rhythm: Normal rate and regular rhythm  Heart sounds: Normal heart sounds  No murmur heard  Pulmonary:      Effort: Pulmonary effort is normal  No respiratory distress  Breath sounds: Normal breath sounds  Abdominal:      General: Bowel sounds are normal  There is no distension  Palpations: Abdomen is soft  Tenderness: There is no abdominal tenderness  Musculoskeletal:         General: Normal range of motion  Cervical back: Normal range of motion and neck supple  Skin:     General: Skin is warm  Capillary Refill: Capillary refill takes less than 2 seconds     Neurological:      " Mental Status: She is alert and oriented to person, place, and time  Cranial Nerves: No cranial nerve deficit  Psychiatric:         Mood and Affect: Mood normal          Thought Content: Thought content normal                        Assessment/Plan:   Zion Ogden was seen today for physical exam     Diagnoses and all orders for this visit:    Annual physical exam  -     Lipid panel; Future  -     CBC and Platelet; Future  -     Comprehensive metabolic panel; Future  -     TSH, 3rd generation with Free T4 reflex; Future    Screening for cardiovascular condition  -     Lipid panel; Future  -     CBC and Platelet; Future  -     Comprehensive metabolic panel; Future  -     TSH, 3rd generation with Free T4 reflex; Future    BMI 25 0-25 9,adult  -     Lipid panel; Future  -     CBC and Platelet; Future  -     Comprehensive metabolic panel; Future  -     TSH, 3rd generation with Free T4 reflex; Future    Age-related nuclear cataract of both eyes  -     Lipid panel; Future  -     CBC and Platelet; Future  -     Comprehensive metabolic panel; Future  -     TSH, 3rd generation with Free T4 reflex; Future    Beta-thalassemia ([facility] [address] )  -     Lipid panel; Future  -     CBC and Platelet; Future  -     Comprehensive metabolic panel; Future  -     TSH, 3rd generation with Free T4 reflex; Future    Dry eyes  -     Lipid panel; Future  -     CBC and Platelet; Future  -     Comprehensive metabolic panel; Future  -     TSH, 3rd generation with Free T4 reflex; Future    Screening for thyroid disorder  -     Lipid panel; Future  -     CBC and Platelet; Future  -     Comprehensive metabolic panel; Future  -     TSH, 3rd generation with Free T4 reflex; Future      Return in about 1 year (around 6/20/2024) for Annual physical   Patient Instructions     Please get your labwork done fasting  Do not eat/drink for about 8-12 hours prior to getting the labwork done, however you may drink water or black coffee while you are fasting    Please use a [facility] lab if possible, as we receive the lab results more quickly  We will notify you of your labwork results even if they are normal   Please contact us if you do not hear about your lab results after one week  Low Carb Recommendations:  Please follow a low carbohydrate, high protein diet  [employer] is a good mick/computer program to keep food logs  Your meals should be less than 30 grams of carbohydrates  Your snacks should be less than 15 grams of carbohydrates  You should drink at least 64 ounces of water daily  You should walk at least 30 minutes daily  Wellness Visit for Adults   AMBULATORY CARE:   A wellness visit  is when you see your healthcare provider to get screened for health problems  Your healthcare provider will also give you advice on how to stay healthy  Write down your questions so you remember to ask them  Ask your healthcare provider how often you should have a wellness visit  What happens at a wellness visit:  Your healthcare provider will ask about your health, and your family history of health problems  This includes high blood pressure, heart disease, and cancer  He or she will ask if you have symptoms that concern you, if you smoke, and about your mood  You may also be asked about your intake of medicines, supplements, food, and alcohol  Any of the following may be done:  • Your weight  will be checked  Your height may also be checked so your body mass index (BMI) can be calculated  Your BMI shows if you are at a healthy weight  • Your blood pressure  and heart rate will be checked  Your temperature may also be checked  • Blood and urine tests  may be done  Blood tests may be done to check your cholesterol levels  Abnormal cholesterol levels increase your risk for heart disease and stroke  You may also need a blood or urine test to check for diabetes if you are at increased risk   Urine tests may be done to look for signs of an infection or kidney disease  • A physical exam  includes checking your heartbeat and lungs with a stethoscope  Your healthcare provider may also check your skin to look for sun damage  • Screening tests  may be recommended  A screening test is done to check for diseases that may not cause symptoms  The screening tests you may need depend on your age, gender, family history, and lifestyle habits  For example, colorectal screening may be recommended if you are 48years old or older  Screening tests you need if you are a woman:   • A Pap smear  is used to screen for cervical cancer  Pap smears are usually done every 3 to 5 years depending on your age  You may need them more often if you have had abnormal Pap smear test results in the past  Ask your healthcare provider how often you should have a Pap smear  • A mammogram  is an x-ray of your breasts to screen for breast cancer  Experts recommend mammograms every 2 years starting at age 48 years  You may need a mammogram at age 52 years or younger if you have an increased risk for breast cancer  Talk to your healthcare provider about when you should start having mammograms and how often you need them  Vaccines you may need:   • Get an influenza vaccine  every year  The influenza vaccine protects you from the flu  Several types of viruses cause the flu  The viruses change over time, so new vaccines are made each year  • Get a tetanus-diphtheria (Td) booster vaccine  every 10 years  This vaccine protects you against tetanus and diphtheria  Tetanus is a severe infection that may cause painful muscle spasms and lockjaw  Diphtheria is a severe bacterial infection that causes a thick covering in the back of your mouth and throat  • Get a human papillomavirus (HPV) vaccine  if you are female and aged 23 to 32 or male 23 to 24 and never received it  This vaccine protects you from HPV infection  HPV is the most common infection spread by sexual contact   HPV may also cause vaginal, penile, and anal cancers  • Get a pneumococcal vaccine  if you are aged 72 years or older  The pneumococcal vaccine is an injection given to protect you from pneumococcal disease  Pneumococcal disease is an infection caused by pneumococcal bacteria  The infection may cause pneumonia, meningitis, or an ear infection  • Get a shingles vaccine  if you are 60 or older, even if you have had shingles before  The shingles vaccine is an injection to protect you from the varicella-zoster virus  This is the same virus that causes chickenpox  Shingles is a painful rash that develops in people who had chickenpox or have been exposed to the virus  How to eat healthy:  My Plate is a model for planning healthy meals  It shows the types and amounts of foods that should go on your plate  Fruits and vegetables make up about half of your plate, and grains and protein make up the other half  A serving of dairy is included on the side of your plate  The amount of calories and serving sizes you need depends on your age, gender, weight, and height  Examples of healthy foods are listed below:  • Eat a variety of vegetables  such as dark green, red, and orange vegetables  You can also include canned vegetables low in sodium (salt) and frozen vegetables without added butter or sauces  • Eat a variety of fresh fruits , canned fruit in 100% juice, frozen fruit, and dried fruit  • Include whole grains  At least half of the grains you eat should be whole grains  Examples include whole-wheat bread, wheat pasta, brown rice, and whole-grain cereals such as oatmeal     • Eat a variety of protein foods such as seafood (fish and shellfish), lean meat, and poultry without skin (turkey and chicken)  Examples of lean meats include pork leg, shoulder, or tenderloin, and beef round, sirloin, tenderloin, and extra lean ground beef   Other protein foods include eggs and egg substitutes, beans, peas, soy products, nuts, and seeds     • Choose low-fat dairy products such as skim or 1% milk or low-fat yogurt, cheese, and cottage cheese  • Limit unhealthy fats  such as butter, hard margarine, and shortening  Exercise:  Exercise at least 30 minutes per day on most days of the week  Some examples of exercise include walking, biking, dancing, and swimming  You can also fit in more physical activity by taking the stairs instead of the elevator or parking farther away from stores  Include muscle strengthening activities 2 days each week  Regular exercise provides many health benefits  It helps you manage your weight, and decreases your risk for type 2 diabetes, heart disease, stroke, and high blood pressure  Exercise can also help improve your mood  Ask your healthcare provider about the best exercise plan for you  General health and safety guidelines:   • Do not smoke  Nicotine and other chemicals in cigarettes and cigars can cause lung damage  Ask your healthcare provider for information if you currently smoke and need help to quit  E-cigarettes or smokeless tobacco still contain nicotine  Talk to your healthcare provider before you use these products  • Limit alcohol  A drink of alcohol is 12 ounces of beer, 5 ounces of wine, or 1½ ounces of liquor  • Lose weight, if needed  Being overweight increases your risk of certain health conditions  These include heart disease, high blood pressure, type 2 diabetes, and certain types of cancer  • Protect your skin  Do not sunbathe or use tanning beds  Use sunscreen with a SPF 15 or higher  Apply sunscreen at least 15 minutes before you go outside  Reapply sunscreen every 2 hours  Wear protective clothing, hats, and sunglasses when you are outside  • Drive safely  Always wear your seatbelt  Make sure everyone in your car wears a seatbelt  A seatbelt can save your life if you are in an accident  Do not use your cell phone when you are driving   This could distract you and cause an accident  Pull over if you need to make a call or send a text message  • Practice safe sex  Use latex condoms if are sexually active and have more than one partner  Your healthcare provider may recommend screening tests for sexually transmitted infections (STIs)  • Wear helmets, lifejackets, and protective gear  Always wear a helmet when you ride a bike or motorcycle, go skiing, or play sports that could cause a head injury  Wear protective equipment when you play sports  Wear a lifejacket when you are on a boat or doing water sports  © Copyright [facility] 2022 Information is for End User's use only and may not be sold, redistributed or otherwise used for commercial purposes  The above information is an  only  It is not intended as medical advice for individual conditions or treatments  Talk to your doctor, nurse or pharmacist before following any medical regimen to see if it is safe and effective for you

## 2023-06-20 NOTE — PATIENT INSTRUCTIONS
Please get your labwork done fasting  Do not eat/drink for about 8-12 hours prior to getting the labwork done, however you may drink water or black coffee while you are fasting  Please use a [facility] lab if possible, as we receive the lab results more quickly  We will notify you of your labwork results even if they are normal   Please contact us if you do not hear about your lab results after one week  Low Carb Recommendations:  Please follow a low carbohydrate, high protein diet  Tailored Fit is a good mick/computer program to keep food logs  Your meals should be less than 30 grams of carbohydrates  Your snacks should be less than 15 grams of carbohydrates  You should drink at least 64 ounces of water daily  You should walk at least 30 minutes daily  Wellness Visit for Adults   AMBULATORY CARE:   A wellness visit  is when you see your healthcare provider to get screened for health problems  Your healthcare provider will also give you advice on how to stay healthy  Write down your questions so you remember to ask them  Ask your healthcare provider how often you should have a wellness visit  What happens at a wellness visit:  Your healthcare provider will ask about your health, and your family history of health problems  This includes high blood pressure, heart disease, and cancer  He or she will ask if you have symptoms that concern you, if you smoke, and about your mood  You may also be asked about your intake of medicines, supplements, food, and alcohol  Any of the following may be done: Your weight  will be checked  Your height may also be checked so your body mass index (BMI) can be calculated  Your BMI shows if you are at a healthy weight  Your blood pressure  and heart rate will be checked  Your temperature may also be checked  Blood and urine tests  may be done  Blood tests may be done to check your cholesterol levels   Abnormal cholesterol levels increase your risk for heart disease and stroke  You may also need a blood or urine test to check for diabetes if you are at increased risk  Urine tests may be done to look for signs of an infection or kidney disease  A physical exam  includes checking your heartbeat and lungs with a stethoscope  Your healthcare provider may also check your skin to look for sun damage  Screening tests  may be recommended  A screening test is done to check for diseases that may not cause symptoms  The screening tests you may need depend on your age, gender, family history, and lifestyle habits  For example, colorectal screening may be recommended if you are 48years old or older  Screening tests you need if you are a woman:   A Pap smear  is used to screen for cervical cancer  Pap smears are usually done every 3 to 5 years depending on your age  You may need them more often if you have had abnormal Pap smear test results in the past  Ask your healthcare provider how often you should have a Pap smear  A mammogram  is an x-ray of your breasts to screen for breast cancer  Experts recommend mammograms every 2 years starting at age 48 years  You may need a mammogram at age 52 years or younger if you have an increased risk for breast cancer  Talk to your healthcare provider about when you should start having mammograms and how often you need them  Vaccines you may need:   Get an influenza vaccine  every year  The influenza vaccine protects you from the flu  Several types of viruses cause the flu  The viruses change over time, so new vaccines are made each year  Get a tetanus-diphtheria (Td) booster vaccine  every 10 years  This vaccine protects you against tetanus and diphtheria  Tetanus is a severe infection that may cause painful muscle spasms and lockjaw  Diphtheria is a severe bacterial infection that causes a thick covering in the back of your mouth and throat      Get a human papillomavirus (HPV) vaccine  if you are female and aged 23 to 32 or male 23 to 24 and never received it  This vaccine protects you from HPV infection  HPV is the most common infection spread by sexual contact  HPV may also cause vaginal, penile, and anal cancers  Get a pneumococcal vaccine  if you are aged 72 years or older  The pneumococcal vaccine is an injection given to protect you from pneumococcal disease  Pneumococcal disease is an infection caused by pneumococcal bacteria  The infection may cause pneumonia, meningitis, or an ear infection  Get a shingles vaccine  if you are 60 or older, even if you have had shingles before  The shingles vaccine is an injection to protect you from the varicella-zoster virus  This is the same virus that causes chickenpox  Shingles is a painful rash that develops in people who had chickenpox or have been exposed to the virus  How to eat healthy:  My Plate is a model for planning healthy meals  It shows the types and amounts of foods that should go on your plate  Fruits and vegetables make up about half of your plate, and grains and protein make up the other half  A serving of dairy is included on the side of your plate  The amount of calories and serving sizes you need depends on your age, gender, weight, and height  Examples of healthy foods are listed below:  Eat a variety of vegetables  such as dark green, red, and orange vegetables  You can also include canned vegetables low in sodium (salt) and frozen vegetables without added butter or sauces  Eat a variety of fresh fruits , canned fruit in 100% juice, frozen fruit, and dried fruit  Include whole grains  At least half of the grains you eat should be whole grains  Examples include whole-wheat bread, wheat pasta, brown rice, and whole-grain cereals such as oatmeal     Eat a variety of protein foods such as seafood (fish and shellfish), lean meat, and poultry without skin (turkey and chicken)   Examples of lean meats include pork leg, shoulder, or tenderloin, and beef round, sirloin, tenderloin, and extra lean ground beef  Other protein foods include eggs and egg substitutes, beans, peas, soy products, nuts, and seeds  Choose low-fat dairy products such as skim or 1% milk or low-fat yogurt, cheese, and cottage cheese  Limit unhealthy fats  such as butter, hard margarine, and shortening  Exercise:  Exercise at least 30 minutes per day on most days of the week  Some examples of exercise include walking, biking, dancing, and swimming  You can also fit in more physical activity by taking the stairs instead of the elevator or parking farther away from stores  Include muscle strengthening activities 2 days each week  Regular exercise provides many health benefits  It helps you manage your weight, and decreases your risk for type 2 diabetes, heart disease, stroke, and high blood pressure  Exercise can also help improve your mood  Ask your healthcare provider about the best exercise plan for you  General health and safety guidelines:   Do not smoke  Nicotine and other chemicals in cigarettes and cigars can cause lung damage  Ask your healthcare provider for information if you currently smoke and need help to quit  E-cigarettes or smokeless tobacco still contain nicotine  Talk to your healthcare provider before you use these products  Limit alcohol  A drink of alcohol is 12 ounces of beer, 5 ounces of wine, or 1½ ounces of liquor  Lose weight, if needed  Being overweight increases your risk of certain health conditions  These include heart disease, high blood pressure, type 2 diabetes, and certain types of cancer  Protect your skin  Do not sunbathe or use tanning beds  Use sunscreen with a SPF 15 or higher  Apply sunscreen at least 15 minutes before you go outside  Reapply sunscreen every 2 hours  Wear protective clothing, hats, and sunglasses when you are outside  Drive safely  Always wear your seatbelt  Make sure everyone in your car wears a seatbelt   A seatbelt can save your life if you are in an accident  Do not use your cell phone when you are driving  This could distract you and cause an accident  Pull over if you need to make a call or send a text message  Practice safe sex  Use latex condoms if are sexually active and have more than one partner  Your healthcare provider may recommend screening tests for sexually transmitted infections (STIs)  Wear helmets, lifejackets, and protective gear  Always wear a helmet when you ride a bike or motorcycle, go skiing, or play sports that could cause a head injury  Wear protective equipment when you play sports  Wear a lifejacket when you are on a boat or doing water sports  © Copyright [employer] 2022 Information is for End User's use only and may not be sold, redistributed or otherwise used for commercial purposes  The above information is an  only  It is not intended as medical advice for individual conditions or treatments  Talk to your doctor, nurse or pharmacist before following any medical regimen to see if it is safe and effective for you

## 2023-06-27 ENCOUNTER — APPOINTMENT (OUTPATIENT)
Dept: LAB | Facility: CLINIC | Age: 63
End: 2023-06-27
Payer: COMMERCIAL

## 2023-06-27 DIAGNOSIS — D56.1 BETA-THALASSEMIA (HCC): ICD-10-CM

## 2023-06-27 DIAGNOSIS — Z13.6 SCREENING FOR CARDIOVASCULAR CONDITION: ICD-10-CM

## 2023-06-27 DIAGNOSIS — Z00.00 ANNUAL PHYSICAL EXAM: ICD-10-CM

## 2023-06-27 DIAGNOSIS — Z13.29 SCREENING FOR THYROID DISORDER: ICD-10-CM

## 2023-06-27 DIAGNOSIS — H04.123 DRY EYES: ICD-10-CM

## 2023-06-27 DIAGNOSIS — H25.13 AGE-RELATED NUCLEAR CATARACT OF BOTH EYES: ICD-10-CM

## 2023-06-27 LAB
ALBUMIN SERPL BCP-MCNC: 3.9 G/DL (ref 3.5–5)
ALP SERPL-CCNC: 40 U/L (ref 46–116)
ALT SERPL W P-5'-P-CCNC: 32 U/L (ref 12–78)
ANION GAP SERPL CALCULATED.3IONS-SCNC: 1 MMOL/L
AST SERPL W P-5'-P-CCNC: 33 U/L (ref 5–45)
BILIRUB SERPL-MCNC: 0.72 MG/DL (ref 0.2–1)
BUN SERPL-MCNC: 18 MG/DL (ref 5–25)
CALCIUM SERPL-MCNC: 9.2 MG/DL (ref 8.3–10.1)
CHLORIDE SERPL-SCNC: 111 MMOL/L (ref 96–108)
CHOLEST SERPL-MCNC: 178 MG/DL
CO2 SERPL-SCNC: 27 MMOL/L (ref 21–32)
CREAT SERPL-MCNC: 1.04 MG/DL (ref 0.6–1.3)
ERYTHROCYTE [DISTWIDTH] IN BLOOD BY AUTOMATED COUNT: 16.6 % (ref 11.6–15.1)
GFR SERPL CREATININE-BSD FRML MDRD: 56 ML/MIN/1.73SQ M
GLUCOSE P FAST SERPL-MCNC: 92 MG/DL (ref 65–99)
HCT VFR BLD AUTO: 33.7 % (ref 34.8–46.1)
HDLC SERPL-MCNC: 93 MG/DL
HGB BLD-MCNC: 9.9 G/DL (ref 11.5–15.4)
LDLC SERPL CALC-MCNC: 76 MG/DL (ref 0–100)
MCH RBC QN AUTO: 18.7 PG (ref 26.8–34.3)
MCHC RBC AUTO-ENTMCNC: 29.4 G/DL (ref 31.4–37.4)
MCV RBC AUTO: 64 FL (ref 82–98)
NONHDLC SERPL-MCNC: 85 MG/DL
PLATELET # BLD AUTO: 236 THOUSANDS/UL (ref 149–390)
PMV BLD AUTO: 11 FL (ref 8.9–12.7)
POTASSIUM SERPL-SCNC: 4 MMOL/L (ref 3.5–5.3)
PROT SERPL-MCNC: 7.3 G/DL (ref 6.4–8.4)
RBC # BLD AUTO: 5.3 MILLION/UL (ref 3.81–5.12)
SODIUM SERPL-SCNC: 139 MMOL/L (ref 135–147)
TRIGL SERPL-MCNC: 45 MG/DL
TSH SERPL DL<=0.05 MIU/L-ACNC: 2.23 UIU/ML (ref 0.45–4.5)
WBC # BLD AUTO: 4.66 THOUSAND/UL (ref 4.31–10.16)

## 2023-06-27 PROCEDURE — 80053 COMPREHEN METABOLIC PANEL: CPT

## 2023-06-27 PROCEDURE — 84443 ASSAY THYROID STIM HORMONE: CPT

## 2023-06-27 PROCEDURE — 80061 LIPID PANEL: CPT

## 2023-06-27 PROCEDURE — 36415 COLL VENOUS BLD VENIPUNCTURE: CPT

## 2023-06-27 PROCEDURE — 85027 COMPLETE CBC AUTOMATED: CPT

## 2024-01-11 ENCOUNTER — EVALUATION (OUTPATIENT)
Dept: PHYSICAL THERAPY | Facility: CLINIC | Age: 64
End: 2024-01-11
Payer: MEDICARE

## 2024-01-11 DIAGNOSIS — M25.662 KNEE STIFFNESS, LEFT: Primary | ICD-10-CM

## 2024-01-11 DIAGNOSIS — M25.652 HIP STIFFNESS, LEFT: ICD-10-CM

## 2024-01-11 DIAGNOSIS — G89.18 ACUTE POST-OPERATIVE PAIN: ICD-10-CM

## 2024-01-11 PROCEDURE — 97162 PT EVAL MOD COMPLEX 30 MIN: CPT

## 2024-01-11 PROCEDURE — 97110 THERAPEUTIC EXERCISES: CPT

## 2024-01-11 NOTE — PROGRESS NOTES
PT Evaluation     Today's date: 2024  Patient name: Nadia Mata  : 1960  MRN: [id number]  Referring provider: Althea Greene  Dx:   Encounter Diagnosis     ICD-10-CM    1. Knee stiffness, left  M25.662       2. Hip stiffness, left  M25.652       3. Acute post-operative pain  G89.18                      Assessment  Assessment details: Problem List:  1) Decreased mobility of left hip and knee - addressing with manual therapy, stretches, ROM activities  2) Decreased strength of left hip and knee - addressing with therapeutic exercise, neuro-reeducation    Nadia Mata is a pleasant 63 y.o. female who presents with weakness and decreased mobility of left hip and knee secondary to recent femur fx and subsequent ORIF.  She has tightness of the quadriceps, hamstrings, gluteal muscles, pain along the lateral portion of the thigh and knee, and swelling of the involved limb resulting in ROM deficits and impaired function of the LE.  No further referral appears necessary at this time based upon examination results.  I expect she will improve as PT will provide interventions tailored towards improving strength, range, and function of the operated limb.        Comparable signs:  1) Knee flexion ROM ~ 66  2) Knee extension strength - 3  Impairments: abnormal gait, abnormal or restricted ROM, activity intolerance, impaired balance, impaired physical strength, lacks appropriate home exercise program, pain with function and weight-bearing intolerance    Symptom irritability: moderateUnderstanding of Dx/Px/POC: good   Prognosis: good    Goals  Short Term Goals: to be achieved by 4 weeks  1) Patient to be independent with basic HEP.  2) Decrease pain to 4/10 at its worst.  3) Increase R knee flex ROM to 115 deg to increase function post-op.   4) Increase R knee ext ROM to -5 deg to improve gait post-op.  5) Increase LE strength by 1/2 MMT grade in all deficient planes post-op.    Long Term Goals: to be achieved by  discharge  1) Patient to be independent with comprehensive HEP.  2) R knee ROM WNL all planes to improve a/iadls post-op.  3) Increase LE strength to 5/5 MMT grade in all planes to improve a/iadls post-op.  4) Patient to report no sleep interruption secondary to pain post-op.  5) Increase ambulatory tolerance to 60 min post-op without AD.     Plan  Plan details: Address physical impairments found during IE via therapeutic exercises, neuromuscular re-education, and manual therapy to improve functional tolerance. Develop HEP for self-management of symptoms.    Patient would benefit from: skilled physical therapy  Referral necessary: No  Planned therapy interventions: home exercise program, flexibility, joint mobilization, manual therapy, massage, neuromuscular re-education, patient education, postural training, prosthetic fitting/training, strengthening, stretching, therapeutic activities, therapeutic exercise and therapeutic training  Frequency: 2x week  Duration in visits: 16  Duration in weeks: 8  Plan of Care beginning date: 1/11/2024  Plan of Care expiration date: 3/7/2024  Treatment plan discussed with: patient      Subjective Evaluation    History of Present Illness  Date of surgery: 1/11/2024  Mechanism of injury: surgery and trauma  Mechanism of injury: Pt recent ORIF at L femur, fx occurred during skiing trip.  Pt had 3 hr 30 minute drive home from hospital, reports concerns w/ moderate swelling of knee and calf.  Pt reports receiving tylenol/oxycodone for pain, states that ice helped however in hospital reports that they were not receiving it on the proper schedule of 20 on/ 20 off.  Pt reports pain and tightness along the lateral aspect of left thigh and over the left knee joint.   Pt reports symptom aggravation highest when attempting to lift the operated limb, has first floor setup at home for time being.  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           Not a recurrent problem   Quality of life: good    Patient Goals  Patient goals for therapy: decreased edema, decreased pain, increased motion, return to work and increased strength  Patient goal: Would like to mobile enough to go on planned vacation to [place] in March  Pain  Current pain ratin  At best pain ratin  At worst pain ratin  Location: Lateral side of left thigh  Quality: discomfort and dull ache  Relieving factors: rest, change in position and ice  Aggravating factors: walking  Progression: worsening    Social Support  Steps to enter house: yes  1  Stairs in house: yes   Lives in: multiple-level home  Lives with: spouse and adult children    Employment status: not working  Treatments  Current treatment: medication        Objective     Observations     Right Knee   Positive for edema.     Right Ankle/Foot   Positive for edema.     Active Range of Motion     Right Hip   Flexion: WFL  Extension: WFL  Abduction: WFL  Left Knee   Flexion: 66 degrees     Right Knee   Flexion: WFL  Extension: WFL  Left Ankle/Foot   Dorsiflexion (kf): WFL  Plantar flexion: WFL    Right Ankle/Foot   Dorsiflexion (kf): WFL  Plantar flexion: WFL    Strength/Myotome Testing     Left Hip   Planes of Motion   Flexion: 3-  Abduction: 3    Left Knee   Flexion: 3  Extension: 3    Additional Strength Details  Pt performed quad set in supine, had good, palpable quad contraction.             Precautions: NA      Manuals             Knee PROM             STM of Quad             Patellar mobs                          Neuro Re-Ed             Quad set HEP            Weight shifting HEP            SLR             LAQ             Supine Hip ABD W/ band            Glute set/bridge             STS             Ther Ex             Ankle pumps HEP            Heel slides HEP            Seated knee flexion stretch             Heel prop stretch             Pt education  CS            RB - activity tolerance                                        Ther Activity                                       Gait Training                                       Modalities

## 2024-01-11 NOTE — LETTER
2024    Althea Greene  [address] [facility] [address]    Patient: Nadia Mata   YOB: 1960   Date of Visit: 2024     Encounter Diagnosis     ICD-10-CM    1. Knee stiffness, left  M25.662 PT plan of care cert/re-cert      2. Hip stiffness, left  M25.652 PT plan of care cert/re-cert      3. Acute post-operative pain  G89.18 PT plan of care cert/re-cert          Dear Dr. Greene:    Thank you for your recent referral of Nadia Mata. Please review the attached evaluation summary from Nadia's recent visit.     Please verify that you agree with the plan of care by signing the attached order.     If you have any questions or concerns, please do not hesitate to call.     I sincerely appreciate the opportunity to share in the care of one of your patients and hope to have another opportunity to work with you in the near future.       Sincerely,    Scarlet Blair, PT      Referring Provider:      I certify that I have read the below Plan of Care and certify the need for these services furnished under this plan of treatment while under my care.                    Althea Greene  [address] [id number]  Via Fax: [phone]          PT Evaluation     Today's date: 2024  Patient name: Nadia Mata  : 1960  MRN: [id number]  Referring provider: Althea Greene  Dx:   Encounter Diagnosis     ICD-10-CM    1. Knee stiffness, left  M25.662       2. Hip stiffness, left  M25.652       3. Acute post-operative pain  G89.18                      Assessment  Assessment details: Problem List:  1) Decreased mobility of left hip and knee - addressing with manual therapy, stretches, ROM activities  2) Decreased strength of left hip and knee - addressing with therapeutic exercise, neuro-reeducation    Nadia Mata is a pleasant 63 y.o. female who presents with weakness and decreased mobility of left hip and knee secondary to recent femur fx and subsequent ORIF.  She has tightness of  the quadriceps, hamstrings, gluteal muscles, pain along the lateral portion of the thigh and knee, and swelling of the involved limb resulting in ROM deficits and impaired function of the LE.  No further referral appears necessary at this time based upon examination results.  I expect she will improve as PT will provide interventions tailored towards improving strength, range, and function of the operated limb.        Comparable signs:  1) Knee flexion ROM ~ 66  2) Knee extension strength - 3  Impairments: abnormal gait, abnormal or restricted ROM, activity intolerance, impaired balance, impaired physical strength, lacks appropriate home exercise program, pain with function and weight-bearing intolerance    Symptom irritability: moderateUnderstanding of Dx/Px/POC: good   Prognosis: good    Goals  Short Term Goals: to be achieved by 4 weeks  1) Patient to be independent with basic HEP.  2) Decrease pain to 4/10 at its worst.  3) Increase R knee flex ROM to 115 deg to increase function post-op.   4) Increase R knee ext ROM to -5 deg to improve gait post-op.  5) Increase LE strength by 1/2 MMT grade in all deficient planes post-op.    Long Term Goals: to be achieved by discharge  1) Patient to be independent with comprehensive HEP.  2) R knee ROM WNL all planes to improve a/iadls post-op.  3) Increase LE strength to 5/5 MMT grade in all planes to improve a/iadls post-op.  4) Patient to report no sleep interruption secondary to pain post-op.  5) Increase ambulatory tolerance to 60 min post-op without AD.     Plan  Plan details: Address physical impairments found during IE via therapeutic exercises, neuromuscular re-education, and manual therapy to improve functional tolerance. Develop HEP for self-management of symptoms.    Patient would benefit from: skilled physical therapy  Referral necessary: No  Planned therapy interventions: home exercise program, flexibility, joint mobilization, manual therapy, massage,  neuromuscular re-education, patient education, postural training, prosthetic fitting/training, strengthening, stretching, therapeutic activities, therapeutic exercise and therapeutic training  Frequency: 2x week  Duration in visits: 16  Duration in weeks: 8  Plan of Care beginning date: 2024  Plan of Care expiration date: 3/7/2024  Treatment plan discussed with: patient      Subjective Evaluation    History of Present Illness  Date of surgery: 2024  Mechanism of injury: surgery and trauma  Mechanism of injury: Pt recent ORIF at L femur, fx occurred during skiing trip.  Pt had 3 hr 30 minute drive home from hospital, reports concerns w/ moderate swelling of knee and calf.  Pt reports receiving tylenol/oxycodone for pain, states that ice helped however in hospital reports that they were not receiving it on the proper schedule of 20 on/ 20 off.  Pt reports pain and tightness along the lateral aspect of left thigh and over the left knee joint.   Pt reports symptom aggravation highest when attempting to lift the operated limb, has first floor setup at home for time being.  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           Not a recurrent problem   Quality of life: good    Patient Goals  Patient goals for therapy: decreased edema, decreased pain, increased motion, return to work and increased strength  Patient goal: Would like to mobile enough to go on planned vacation to [place] in March  Pain  Current pain ratin  At best pain ratin  At worst pain ratin  Location: Lateral side of left thigh  Quality: discomfort and dull ache  Relieving factors: rest, change in position and ice  Aggravating factors: walking  Progression: worsening    Social Support  Steps to enter house: yes  1  Stairs in house: yes   Lives in: multiple-level home  Lives with: spouse and adult children    Employment  status: not working  Treatments  Current treatment: medication        Objective     Observations     Right Knee   Positive for edema.     Right Ankle/Foot   Positive for edema.     Active Range of Motion     Right Hip   Flexion: WFL  Extension: WFL  Abduction: WFL  Left Knee   Flexion: 66 degrees     Right Knee   Flexion: WFL  Extension: WFL  Left Ankle/Foot   Dorsiflexion (kf): WFL  Plantar flexion: WFL    Right Ankle/Foot   Dorsiflexion (kf): WFL  Plantar flexion: WFL    Strength/Myotome Testing     Left Hip   Planes of Motion   Flexion: 3-  Abduction: 3    Left Knee   Flexion: 3  Extension: 3    Additional Strength Details  Pt performed quad set in supine, had good, palpable quad contraction.             Precautions: NA      Manuals 1/11            Knee PROM             STM of Quad             Patellar mobs                          Neuro Re-Ed             Quad set HEP            Weight shifting HEP            SLR             LAQ             Supine Hip ABD W/ band            Glute set/bridge             STS             Ther Ex             Ankle pumps HEP            Heel slides HEP            Seated knee flexion stretch             Heel prop stretch             Pt education CS            RB - activity tolerance                                        Ther Activity                                       Gait Training                                       Modalities

## 2024-01-16 ENCOUNTER — OFFICE VISIT (OUTPATIENT)
Dept: PHYSICAL THERAPY | Facility: CLINIC | Age: 64
End: 2024-01-16
Payer: MEDICARE

## 2024-01-16 ENCOUNTER — APPOINTMENT (OUTPATIENT)
Dept: RADIOLOGY | Facility: MEDICAL CENTER | Age: 64
End: 2024-01-16
Payer: MEDICARE

## 2024-01-16 ENCOUNTER — OFFICE VISIT (OUTPATIENT)
Dept: FAMILY MEDICINE CLINIC | Facility: CLINIC | Age: 64
End: 2024-01-16
Payer: MEDICARE

## 2024-01-16 ENCOUNTER — OFFICE VISIT (OUTPATIENT)
Dept: OBGYN CLINIC | Facility: MEDICAL CENTER | Age: 64
End: 2024-01-16
Payer: MEDICARE

## 2024-01-16 VITALS
TEMPERATURE: 97 F | HEIGHT: 63 IN | HEART RATE: 82 BPM | BODY MASS INDEX: 25.9 KG/M2 | OXYGEN SATURATION: 100 % | SYSTOLIC BLOOD PRESSURE: 100 MMHG | DIASTOLIC BLOOD PRESSURE: 60 MMHG

## 2024-01-16 VITALS
HEIGHT: 63 IN | BODY MASS INDEX: 24.98 KG/M2 | SYSTOLIC BLOOD PRESSURE: 108 MMHG | HEART RATE: 79 BPM | WEIGHT: 141 LBS | DIASTOLIC BLOOD PRESSURE: 65 MMHG

## 2024-01-16 DIAGNOSIS — S42.292D CLOSED FRACTURE OF HEAD OF LEFT HUMERUS WITH ROUTINE HEALING, SUBSEQUENT ENCOUNTER: ICD-10-CM

## 2024-01-16 DIAGNOSIS — M25.562 ACUTE PAIN OF LEFT KNEE: Primary | ICD-10-CM

## 2024-01-16 DIAGNOSIS — M25.662 KNEE STIFFNESS, LEFT: Primary | ICD-10-CM

## 2024-01-16 DIAGNOSIS — D56.1 BETA-THALASSEMIA (HCC): ICD-10-CM

## 2024-01-16 DIAGNOSIS — Z98.890 STATUS POST OPEN REDUCTION AND INTERNAL FIXATION (ORIF) OF FRACTURE: ICD-10-CM

## 2024-01-16 DIAGNOSIS — M25.652 HIP STIFFNESS, LEFT: ICD-10-CM

## 2024-01-16 DIAGNOSIS — G89.18 ACUTE POST-OPERATIVE PAIN: ICD-10-CM

## 2024-01-16 DIAGNOSIS — Z87.81 STATUS POST OPEN REDUCTION AND INTERNAL FIXATION (ORIF) OF FRACTURE: ICD-10-CM

## 2024-01-16 DIAGNOSIS — Z98.890 STATUS POST OPEN REDUCTION AND INTERNAL FIXATION (ORIF) OF FRACTURE: Primary | ICD-10-CM

## 2024-01-16 DIAGNOSIS — L90.0 LICHEN SCLEROSUS: ICD-10-CM

## 2024-01-16 DIAGNOSIS — Z87.81 STATUS POST OPEN REDUCTION AND INTERNAL FIXATION (ORIF) OF FRACTURE: Primary | ICD-10-CM

## 2024-01-16 PROCEDURE — 97112 NEUROMUSCULAR REEDUCATION: CPT

## 2024-01-16 PROCEDURE — 99204 OFFICE O/P NEW MOD 45 MIN: CPT | Performed by: STUDENT IN AN ORGANIZED HEALTH CARE EDUCATION/TRAINING PROGRAM

## 2024-01-16 PROCEDURE — 97140 MANUAL THERAPY 1/> REGIONS: CPT

## 2024-01-16 PROCEDURE — 73552 X-RAY EXAM OF FEMUR 2/>: CPT

## 2024-01-16 PROCEDURE — 97110 THERAPEUTIC EXERCISES: CPT

## 2024-01-16 PROCEDURE — 99214 OFFICE O/P EST MOD 30 MIN: CPT | Performed by: FAMILY MEDICINE

## 2024-01-16 RX ORDER — CLOBETASOL PROPIONATE 0.5 MG/G
OINTMENT TOPICAL 2 TIMES DAILY
COMMUNITY
End: 2024-01-16 | Stop reason: SDUPTHER

## 2024-01-16 RX ORDER — MELOXICAM 7.5 MG/1
TABLET ORAL
Qty: 30 TABLET | Refills: 1 | Status: SHIPPED | OUTPATIENT
Start: 2024-01-16

## 2024-01-16 RX ORDER — IBUPROFEN 600 MG/1
TABLET ORAL
COMMUNITY

## 2024-01-16 RX ORDER — OXYCODONE HYDROCHLORIDE 5 MG/1
TABLET ORAL
COMMUNITY
Start: 2024-01-10

## 2024-01-16 RX ORDER — ENOXAPARIN SODIUM 100 MG/ML
INJECTION SUBCUTANEOUS
COMMUNITY
Start: 2024-01-10

## 2024-01-16 RX ORDER — CLOBETASOL PROPIONATE 0.5 MG/G
OINTMENT TOPICAL 2 TIMES DAILY
Qty: 60 G | Refills: 0 | Status: SHIPPED | OUTPATIENT
Start: 2024-01-16
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
## 2024-01-16 NOTE — PROGRESS NOTES
Daily Note     Today's date: 2024  Patient name: Nadia Mata  : 1960  MRN: [id number]  Referring provider: Althea Greene CRNP  Dx: No diagnosis found.               Subjective: ***      Objective: See treatment diary below      Assessment: Tolerated treatment {Tolerated treatment :}. Patient {assessment:}      Plan: {PLAN:[id number]}     Precautions: NA      Manuals            Knee PROM             STM of Quad             Patellar mobs                          Neuro Re-Ed             Quad set HEP            Weight shifting HEP            SLR             LAQ             Supine Hip ABD W/ band            Glute set/bridge             STS             Ther Ex             Ankle pumps HEP            Heel slides HEP            Seated knee flexion stretch             Heel prop stretch             Pt education CS            RB - activity tolerance                                        Ther Activity                                       Gait Training                                       Modalities
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
## 2024-01-16 NOTE — PROGRESS NOTES
Daily Note     Today's date: 2024  Patient name: Nadia Mata  : 1960  MRN: [id number]  Referring provider: Althea Greene CRNP  Dx:   Encounter Diagnosis     ICD-10-CM    1. Knee stiffness, left  M25.662       2. Hip stiffness, left  M25.652       3. Acute post-operative pain  G89.18                      Subjective: Pt reports improvement in pain and function of LE.  States home exercises have been going well, attempted stairs since last visit with success.      Objective: See treatment diary below      Assessment: Tolerated treatment well. Added in long arc quads, supine hip ABD w/ theraband, seated knee flexion stretch, and heel prop stretch, able to complete all w/ vcs for technique.  At this time pt is having difficulty achieving adequate quadricep contraction, as a result is displaying compensatory movements during exercise.  PT will continue to implement and progress interventions isolating LE musculature to aid in restoring strength and movement to the operated limb. Patient demonstrated fatigue post treatment, exhibited good technique with therapeutic exercises, and would benefit from continued PT      Plan: Continue per plan of care.   Add in glute bridges/mini squats next visit.     Precautions: NA      Manuals            Knee PROM  CS           STM of Quad  CS           Patellar mobs                          Neuro Re-Ed             Quad set HEP x10           Weight shifting HEP            SLR             LAQ  x10           Supine Hip ABD W/ band 2x10  GTB           Glute set/bridge  nv           Mini-squat  nv           Ther Ex             Ankle pumps HEP            Heel slides HEP 2x10           Seated knee flexion stretch  10x10s           Heel prop stretch  30s           Pt education CS CS           NuStep - Activity Tolerance  5'                                     Ther Activity                                       Gait Training                                       Modalities

## 2024-01-18 ENCOUNTER — OFFICE VISIT (OUTPATIENT)
Dept: PHYSICAL THERAPY | Facility: CLINIC | Age: 64
End: 2024-01-18
Payer: MEDICARE

## 2024-01-18 DIAGNOSIS — M25.652 HIP STIFFNESS, LEFT: ICD-10-CM

## 2024-01-18 DIAGNOSIS — M25.662 KNEE STIFFNESS, LEFT: Primary | ICD-10-CM

## 2024-01-18 DIAGNOSIS — G89.18 ACUTE POST-OPERATIVE PAIN: ICD-10-CM

## 2024-01-18 PROCEDURE — 97112 NEUROMUSCULAR REEDUCATION: CPT

## 2024-01-18 PROCEDURE — 97110 THERAPEUTIC EXERCISES: CPT

## 2024-01-18 NOTE — PROGRESS NOTES
Daily Note     Today's date: 2024  Patient name: Nadia Mata  : 1960  MRN: [id number]  Referring provider: Althea Greene CRNP  Dx:   Encounter Diagnosis     ICD-10-CM    1. Knee stiffness, left  M25.662       2. Hip stiffness, left  M25.652       3. Acute post-operative pain  G89.18                      Subjective: Pt reports gradual improvement in symptoms, although continues to report pain and ROM limitations.      Objective: See treatment diary below      Assessment: Tolerated treatment well. Added mini squats and gait training with rollator.  PT fitted patient for rollator and educated on usage and proper biomechanics during ambulation.  Pt continued to demonstrate limitations of knee ROM, as well as strength deficits in hip and knee musculature.  PT will continue to implement and progress interventions to strengthen and restore ROM to functional ranges.  Patient demonstrated fatigue post treatment and would benefit from continued PT      Plan: Continue per plan of care.      Precautions: NA      Manuals           Knee PROM  BECKY CARMONA          STM of Quad  CS           Patellar mobs                          Neuro Re-Ed             Quad set HEP x10           Weight shifting HEP            SLR             LAQ  x10 x10          Supine Hip ABD W/ band 2x10  GTB           Glute set/bridge  nv           Mini-squat  nv x10          Ther Ex             Ankle pumps HEP            Heel slides HEP 2x10           Seated knee flexion stretch  10x10s           Heel prop stretch  30s           Pt education BECKY CARMONA          NuStep - Activity Tolerance  5' 5'                                    Ther Activity                                       Gait Training             Laps w/ rollator   2 laps                       Modalities

## 2024-01-29 ENCOUNTER — OFFICE VISIT (OUTPATIENT)
Dept: PHYSICAL THERAPY | Facility: CLINIC | Age: 64
End: 2024-01-29
Payer: MEDICARE

## 2024-01-29 DIAGNOSIS — M25.662 KNEE STIFFNESS, LEFT: Primary | ICD-10-CM

## 2024-01-29 DIAGNOSIS — G89.18 ACUTE POST-OPERATIVE PAIN: ICD-10-CM

## 2024-01-29 DIAGNOSIS — M25.652 HIP STIFFNESS, LEFT: ICD-10-CM

## 2024-01-29 PROCEDURE — 97110 THERAPEUTIC EXERCISES: CPT

## 2024-01-29 PROCEDURE — 97140 MANUAL THERAPY 1/> REGIONS: CPT

## 2024-01-29 PROCEDURE — 97112 NEUROMUSCULAR REEDUCATION: CPT

## 2024-01-29 NOTE — PROGRESS NOTES
Daily Note     Today's date: 2024  Patient name: Nadia Mata  : 1960  MRN: [id number]  Referring provider: Althea Greene CRNP  Dx:   Encounter Diagnosis     ICD-10-CM    1. Knee stiffness, left  M25.662       2. Hip stiffness, left  M25.652       3. Acute post-operative pain  G89.18                      Subjective: Pt returned from trip, states they overall feel improvement in the knee with regards to ROM and strength.  Pt states they attempted taking a few steps without rollater and reported it went well, pt does endorse they negotiated steps several times throughout the course of the trip which brought on some increases in pain and tightness.      Objective: See treatment diary below  Before Tx:  L Knee AROM  - 83  L Knee PROM  - 100    After Tx:  L Knee AROM - 87  L Knee PROM - 105      Assessment: Tolerated treatment well.  Added in supine gastroc stretch w/ strap.  Pt showing improvements in knee ROM however remains out of range of functional requirements.  Trace contraction of R quadricep observed during quad set, PT will continue to implement and progress interventions to further improve ROM and facilitate return of neuromuscular control of knee musculature.  Patient demonstrated fatigue post treatment, exhibited good technique with therapeutic exercises, and would benefit from continued PT      Plan: Continue per plan of care.  Add STIM to L quad next time.     Precautions: NA      Manuals          Knee PROM  CS JK JK         STM of Quad  CS  JK         Patellar mobs    JK                      Neuro Re-Ed             Quad set HEP x10           Weight shifting HEP            SLR             LAQ  x10 x10 x10         Supine Hip ABD W/ band 2x10  GTB           Glute set/bridge  nv           Mini-squat  nv x10          Ther Ex             Ankle pumps HEP            Heel slides HEP 2x10           Seated knee flexion stretch  10x10s  10x10s         Heel prop stretch  30s            Pt education CS CS JK JK         NuStep - Activity Tolerance  5' 5' 5'                                   Ther Activity                                       Gait Training             Laps w/ rollator   2 laps                       Modalities             STIM

## 2024-01-31 ENCOUNTER — APPOINTMENT (OUTPATIENT)
Dept: PHYSICAL THERAPY | Facility: CLINIC | Age: 64
End: 2024-01-31
Payer: MEDICARE

## 2024-02-01 ENCOUNTER — OFFICE VISIT (OUTPATIENT)
Dept: PHYSICAL THERAPY | Facility: CLINIC | Age: 64
End: 2024-02-01
Payer: MEDICARE

## 2024-02-01 DIAGNOSIS — M25.652 HIP STIFFNESS, LEFT: ICD-10-CM

## 2024-02-01 DIAGNOSIS — G89.18 ACUTE POST-OPERATIVE PAIN: ICD-10-CM

## 2024-02-01 DIAGNOSIS — M25.662 KNEE STIFFNESS, LEFT: Primary | ICD-10-CM

## 2024-02-01 PROCEDURE — 97140 MANUAL THERAPY 1/> REGIONS: CPT

## 2024-02-01 PROCEDURE — 97112 NEUROMUSCULAR REEDUCATION: CPT

## 2024-02-01 NOTE — PROGRESS NOTES
Daily Note     Today's date: 2024  Patient name: Nadia Mata  : 1960  MRN: [id number]  Referring provider: Althea Greene CRNP  Dx:   Encounter Diagnosis     ICD-10-CM    1. Knee stiffness, left  M25.662       2. Hip stiffness, left  M25.652       3. Acute post-operative pain  G89.18                      Subjective: Pt reports mild pain in lateral knee after walking since last treatment, states the rest of the limb feels like its getting stronger.      Objective: See treatment diary below      Assessment: Tolerated treatment well. Added in stim for purposes of NMES in facilitating greater quad contraction, pt able to complete w/ vcs for maximizing contraction.  Pt continuing to demonstrate limitations in ROM and strength of quadricep musculature, as a result pt is limited in available motion for knee flexion.  PT will continue to implement interventions addressing deficits with goal of graduation to more functional based interventions. Patient demonstrated fatigue post treatment, exhibited good technique with therapeutic exercises, and would benefit from continued PT      Plan: Continue per plan of care.      Precautions: NA      Manuals         Knee PROM   MATHEW CARMONA        STM of Quad  BECKY CARMONA        Patellar mobs    RONALDK RONALDK                     Neuro Re-Ed             Quad set HEP x10   x10        Weight shifting HEP            SLR             LAQ  x10 x10 x10         Supine Hip ABD W/ band 2x10  GTB           Glute set/bridge  nv           Mini-squat  nv x10          Ther Ex             Ankle pumps HEP            Heel slides HEP 2x10           Seated knee flexion stretch  10x10s  10x10s 10x10s        Heel prop stretch  30s           Pt education CS BECKY CARDENASK RONALDK JK        NuStep - Activity Tolerance  5' 5' 5' 5' RB                                  Ther Activity                                       Gait Training             Laps w/ rollator   2 laps                       Modalities              STIM     2x10  LAQ NMES

## 2024-02-05 ENCOUNTER — EVALUATION (OUTPATIENT)
Dept: PHYSICAL THERAPY | Facility: MEDICAL CENTER | Age: 64
End: 2024-02-05
Payer: MEDICARE

## 2024-02-05 DIAGNOSIS — G89.18 ACUTE POST-OPERATIVE PAIN: ICD-10-CM

## 2024-02-05 DIAGNOSIS — M25.652 HIP STIFFNESS, LEFT: ICD-10-CM

## 2024-02-05 DIAGNOSIS — M25.662 KNEE STIFFNESS, LEFT: Primary | ICD-10-CM

## 2024-02-05 PROCEDURE — 97110 THERAPEUTIC EXERCISES: CPT | Performed by: PHYSICAL THERAPIST

## 2024-02-05 PROCEDURE — 97112 NEUROMUSCULAR REEDUCATION: CPT | Performed by: PHYSICAL THERAPIST

## 2024-02-05 NOTE — PROGRESS NOTES
"Daily Note     Today's date: 2024  Patient name: Nadia Mata  : 1960  MRN: [id number]  Referring provider: Althea Greene CRNP  Dx:   Encounter Diagnosis     ICD-10-CM    1. Knee stiffness, left  M25.662       2. Hip stiffness, left  M25.652       3. Acute post-operative pain  G89.18                      Subjective: Pt reports that she is making slow progress.        Objective: See treatment diary below      Assessment: Tolerated treatment well. Patient demonstrated fatigue post treatment, exhibited good technique with therapeutic exercises, and would benefit from continued PT      Plan: Continue per plan of care.         Precautions: s/p R distal femur ORIF      Ther Ex 2            Bike 10'            Table knee flexion 5\"  x30            Heel slides 5\"  x30            Prone knee/quad str 3x30\"            Prone knee flexion x30            QS 5\"  x50            SLR flexion  3x10            HS and gastroc str 3x30\"                                                                                                                                                                                                                                                                   Modalities 2/            MH 15'                                          "

## 2024-02-06 ENCOUNTER — APPOINTMENT (OUTPATIENT)
Dept: PHYSICAL THERAPY | Facility: CLINIC | Age: 64
End: 2024-02-06
Payer: MEDICARE

## 2024-02-08 ENCOUNTER — OFFICE VISIT (OUTPATIENT)
Dept: PHYSICAL THERAPY | Facility: MEDICAL CENTER | Age: 64
End: 2024-02-08
Payer: MEDICARE

## 2024-02-08 DIAGNOSIS — M25.652 HIP STIFFNESS, LEFT: ICD-10-CM

## 2024-02-08 DIAGNOSIS — M25.662 KNEE STIFFNESS, LEFT: Primary | ICD-10-CM

## 2024-02-08 DIAGNOSIS — G89.18 ACUTE POST-OPERATIVE PAIN: ICD-10-CM

## 2024-02-08 PROCEDURE — 97112 NEUROMUSCULAR REEDUCATION: CPT

## 2024-02-08 PROCEDURE — 97110 THERAPEUTIC EXERCISES: CPT

## 2024-02-08 NOTE — PROGRESS NOTES
"Daily Note     Today's date: 2024  Patient name: Nadia Mata  : 1960  MRN: [id number]  Referring provider: Althea Greene CRNP  Dx:   Encounter Diagnosis     ICD-10-CM    1. Knee stiffness, left  M25.662       2. Acute post-operative pain  G89.18       3. Hip stiffness, left  M25.652                      Subjective: Pt reports that she is more sore but that she was also walking more.       Objective: See treatment diary below      Assessment: Tolerated treatment well. Patient demonstrated fatigue post treatment, exhibited good technique with therapeutic exercises, and would benefit from continued PT  Pt advised to perform gentle scar massage to decrease scar tissue formation.        Plan: Continue per plan of care.         Precautions: s/p R distal femur ORIF      Ther Ex            Bike 10' 10'  Pedal  adj           Table knee flexion 5\"  x30 5\"  x30           Heel slides 5\"  x30 5\"  x30           Prone knee/quad str 3x30\" 3x30\"           Prone knee flexion x30 x30           QS 5\"  x50 5\"  x50           SLR flexion  3x10 3x10           HS and gastroc str 3x30\" 3x30\"                                                                                                                                                                                                                                                                  Modalities            MH 15' 15'  Pre tx                             "

## 2024-02-09 ENCOUNTER — APPOINTMENT (OUTPATIENT)
Dept: PHYSICAL THERAPY | Facility: CLINIC | Age: 64
End: 2024-02-09
Payer: MEDICARE

## 2024-02-12 ENCOUNTER — EVALUATION (OUTPATIENT)
Dept: PHYSICAL THERAPY | Facility: MEDICAL CENTER | Age: 64
End: 2024-02-12
Payer: MEDICARE

## 2024-02-12 ENCOUNTER — APPOINTMENT (OUTPATIENT)
Dept: PHYSICAL THERAPY | Facility: CLINIC | Age: 64
End: 2024-02-12
Payer: MEDICARE

## 2024-02-12 DIAGNOSIS — M25.662 KNEE STIFFNESS, LEFT: Primary | ICD-10-CM

## 2024-02-12 DIAGNOSIS — M25.652 HIP STIFFNESS, LEFT: ICD-10-CM

## 2024-02-12 DIAGNOSIS — G89.18 ACUTE POST-OPERATIVE PAIN: ICD-10-CM

## 2024-02-12 PROCEDURE — 97110 THERAPEUTIC EXERCISES: CPT

## 2024-02-12 PROCEDURE — 97112 NEUROMUSCULAR REEDUCATION: CPT

## 2024-02-12 NOTE — PROGRESS NOTES
"Daily Note     Today's date: 2024  Patient name: Nadia Mata  : 1960  MRN: [id number]  Referring provider: Althea Greene CRNP  Dx:   Encounter Diagnosis     ICD-10-CM    1. Knee stiffness, left  M25.662       2. Acute post-operative pain  G89.18       3. Hip stiffness, left  M25.652                      Subjective: Pt reports that she has good days and bad days.        Objective: See treatment diary below      Assessment: Tolerated treatment well. Patient demonstrated fatigue post treatment, exhibited good technique with therapeutic exercises, and would benefit from continued PT  Improved mobility and tolerance to TE today.        Plan: Continue per plan of care.      Precautions: s/p R distal femur ORIF      Ther Ex           Bike 10' 10'  Pedal  adj 10'  Pedal adj   (L2)          Table knee flexion 5\"  x30 5\"  x30 5\"  x30          Heel slides 5\"  x30 5\"  x30 5\"  x30          Prone knee/quad str 3x30\" 3x30\" 3x30\"          Prone knee flexion x30 x30 3x12          QS 5\"  x50 5\"  x50 5\"  x50          SLR flexion  3x10 3x10 3x10          HS and gastroc str 3x30\" 3x30\" 3x30\"          SLR add, abd, ext   3x10                                                                                                                                                                                                                                                    Modalities           MH 15' 15'  Pre tx 15'  Pre tx                              "

## 2024-02-15 ENCOUNTER — EVALUATION (OUTPATIENT)
Dept: PHYSICAL THERAPY | Facility: MEDICAL CENTER | Age: 64
End: 2024-02-15
Payer: MEDICARE

## 2024-02-15 ENCOUNTER — APPOINTMENT (OUTPATIENT)
Dept: PHYSICAL THERAPY | Facility: CLINIC | Age: 64
End: 2024-02-15
Payer: MEDICARE

## 2024-02-15 DIAGNOSIS — M25.662 KNEE STIFFNESS, LEFT: Primary | ICD-10-CM

## 2024-02-15 DIAGNOSIS — G89.18 ACUTE POST-OPERATIVE PAIN: ICD-10-CM

## 2024-02-15 DIAGNOSIS — M25.652 HIP STIFFNESS, LEFT: ICD-10-CM

## 2024-02-15 PROCEDURE — 97110 THERAPEUTIC EXERCISES: CPT

## 2024-02-15 PROCEDURE — 97112 NEUROMUSCULAR REEDUCATION: CPT

## 2024-02-15 NOTE — PROGRESS NOTES
"Daily Note     Today's date: 2/15/2024  Patient name: Nadia Mata  : 1960  MRN: [id number]  Referring provider: Althea Greene CRNP  Dx:   Encounter Diagnosis     ICD-10-CM    1. Knee stiffness, left  M25.662       2. Acute post-operative pain  G89.18       3. Hip stiffness, left  M25.652                      Subjective: Pt states that she's been walking faster with her walker and questions about the transition to a cane.       Objective: See treatment diary below      Assessment: Tolerated treatment well. Patient demonstrated fatigue post treatment, exhibited good technique with therapeutic exercises, and would benefit from continued PT  Pt is making steady progress in PT and demonstrates improved strength and endurance.  Discussed w/PT HK the transition to cane.      Plan: Continue per plan of care.      Precautions: s/p R distal femur ORIF      Ther Ex 2/5 2/8 2/12 2/15         Bike 10' 10'  Pedal  adj 10'  Pedal adj   (L2) 15'  Pedal  Adj  (L2)         Table knee flexion 5\"  x30 5\"  x30 5\"  x30 5\"  x30         Heel slides 5\"  x30 5\"  x30 5\"  x30 5\"  x30         Prone knee/quad str 3x30\" 3x30\" 3x30\" 3x30\"         Prone knee flexion x30 x30 3x12 3x12         QS 5\"  x50 5\"  x50 5\"  x50 5\"  x50         SLR flexion  3x10 3x10 3x10 3x12         HS and gastroc str 3x30\" 3x30\" 3x30\" 3x30\"         SLR add, abd, ext   3x10 3x12         LAQ    5\"  3x10                                                                                                                                                                                                                                      Modalities 2/5 2/8 2/12 2/15         MH 15' 15'  Pre tx 15'  Pre tx 15'  Pre tx                               "

## 2024-02-19 ENCOUNTER — EVALUATION (OUTPATIENT)
Dept: PHYSICAL THERAPY | Facility: MEDICAL CENTER | Age: 64
End: 2024-02-19
Payer: MEDICARE

## 2024-02-19 DIAGNOSIS — M25.662 KNEE STIFFNESS, LEFT: Primary | ICD-10-CM

## 2024-02-19 DIAGNOSIS — M25.652 HIP STIFFNESS, LEFT: ICD-10-CM

## 2024-02-19 DIAGNOSIS — G89.18 ACUTE POST-OPERATIVE PAIN: ICD-10-CM

## 2024-02-19 PROCEDURE — 97110 THERAPEUTIC EXERCISES: CPT | Performed by: PHYSICAL THERAPIST

## 2024-02-19 PROCEDURE — 97112 NEUROMUSCULAR REEDUCATION: CPT | Performed by: PHYSICAL THERAPIST

## 2024-02-19 NOTE — PROGRESS NOTES
"Daily Note     Today's date: 2024  Patient name: Nadia Mata  : 1960  MRN: [id number]  Referring provider: Althea Greene CRNP  Dx:   Encounter Diagnosis     ICD-10-CM    1. Knee stiffness, left  M25.662       2. Hip stiffness, left  M25.652       3. Acute post-operative pain  G89.18                      Subjective: Pt reports that she was on her feet a lot this weekend and feels like her knee and leg are more swollen then they have been.        Objective: See treatment diary below      Assessment: Tolerated treatment well. Patient demonstrated fatigue post treatment, exhibited good technique with therapeutic exercises, and would benefit from continued PT      Plan: Continue per plan of care.      Precautions: s/p R distal femur ORIF      Ther Ex 2/5 2/8 2/12 2/15 2/19        Bike 10' 10'  Pedal  adj 10'  Pedal adj   (L2) 15'  Pedal  Adj  (L2) 10'  reg        Table knee flexion 5\"  x30 5\"  x30 5\"  x30 5\"  x30 5\"  x30        Heel slides 5\"  x30 5\"  x30 5\"  x30 5\"  x30 5\"  x30        Prone knee/quad str 3x30\" 3x30\" 3x30\" 3x30\" 3x30\"        Prone knee flexion x30 x30 3x12 3x12 3x15        QS 5\"  x50 5\"  x50 5\"  x50 5\"  x50 5\"  x50        SLR flexion  3x10 3x10 3x10 3x12 3x15        HS and gastroc str 3x30\" 3x30\" 3x30\" 3x30\" 3x30\"        SLR add, abd, ext   3x10 3x12 3x15        LAQ    5\"  3x10 3x15  5\"        Standing hip abd     NV                                                                                                                                                                                                                x        Modalities 2/5 2/8 2/12 2/15 2/19        MH 15' 15'  Pre tx 15'  Pre tx 15'  Pre tx 15'  pre                                "

## 2024-02-20 ENCOUNTER — ANESTHESIA EVENT (OUTPATIENT)
Age: 64
End: 2024-02-20
Payer: MEDICARE

## 2024-02-20 ENCOUNTER — APPOINTMENT (OUTPATIENT)
Dept: PHYSICAL THERAPY | Facility: CLINIC | Age: 64
End: 2024-02-20
Payer: MEDICARE

## 2024-02-20 ENCOUNTER — TELEPHONE (OUTPATIENT)
Age: 64
End: 2024-02-20

## 2024-02-20 ENCOUNTER — OFFICE VISIT (OUTPATIENT)
Dept: OBGYN CLINIC | Facility: MEDICAL CENTER | Age: 64
End: 2024-02-20
Payer: MEDICARE

## 2024-02-20 ENCOUNTER — APPOINTMENT (OUTPATIENT)
Dept: RADIOLOGY | Facility: MEDICAL CENTER | Age: 64
End: 2024-02-20
Payer: MEDICARE

## 2024-02-20 VITALS
DIASTOLIC BLOOD PRESSURE: 74 MMHG | SYSTOLIC BLOOD PRESSURE: 114 MMHG | BODY MASS INDEX: 24.59 KG/M2 | WEIGHT: 138.8 LBS | HEART RATE: 76 BPM | HEIGHT: 63 IN

## 2024-02-20 DIAGNOSIS — Z87.81 STATUS POST OPEN REDUCTION AND INTERNAL FIXATION (ORIF) OF FRACTURE: ICD-10-CM

## 2024-02-20 DIAGNOSIS — Z98.890 STATUS POST OPEN REDUCTION AND INTERNAL FIXATION (ORIF) OF FRACTURE: ICD-10-CM

## 2024-02-20 DIAGNOSIS — T85.848D PAIN FROM IMPLANTED HARDWARE, SUBSEQUENT ENCOUNTER: Primary | ICD-10-CM

## 2024-02-20 DIAGNOSIS — T84.84XA PAINFUL ORTHOPAEDIC HARDWARE (HCC): ICD-10-CM

## 2024-02-20 PROCEDURE — 73552 X-RAY EXAM OF FEMUR 2/>: CPT

## 2024-02-20 PROCEDURE — 99214 OFFICE O/P EST MOD 30 MIN: CPT | Performed by: STUDENT IN AN ORGANIZED HEALTH CARE EDUCATION/TRAINING PROGRAM

## 2024-02-20 RX ORDER — CHLORHEXIDINE GLUCONATE ORAL RINSE 1.2 MG/ML
15 SOLUTION DENTAL ONCE
Status: CANCELLED | OUTPATIENT
Start: 2024-02-20 | End: 2024-02-20

## 2024-02-20 RX ORDER — CEFAZOLIN SODIUM 2 G/50ML
2000 SOLUTION INTRAVENOUS ONCE
Status: CANCELLED | OUTPATIENT
Start: 2024-02-22 | End: 2024-02-20

## 2024-02-20 RX ORDER — CHLORHEXIDINE GLUCONATE 4 G/100ML
SOLUTION TOPICAL DAILY PRN
Status: CANCELLED | OUTPATIENT
Start: 2024-02-20

## 2024-02-20 RX ORDER — ACETAMINOPHEN 325 MG/1
975 TABLET ORAL ONCE
Status: CANCELLED | OUTPATIENT
Start: 2024-02-20 | End: 2024-02-20

## 2024-02-20 NOTE — TELEPHONE ENCOUNTER
Caller: Delia from Radiology    Doctor: Ciro      Reason for call:   JENNIFER GOODMAN femur  Results are in EPIC    Call back#: [phone]

## 2024-02-20 NOTE — H&P (VIEW-ONLY)
H&P note    Assessment:   1. S/p Left femur spiral shaft fracture ORIF at [place]    2. Prominent distal screw Left distal femur  Plan:       Findings today are consistent with status post left femur ORIF with retrograde IMN performed on 01/06/2024 with Dr. Peacock at [facility]. Imaging and prognosis was reviewed with the patient today. Incisions are well healed. Interval xrays show callus formation around the fracture, the most distal screw is backing out and is prominent in the lateral knee soft tissues. She was encouraged to continue physical therapy. Continue multi-modal pain control. She is going to [place] for a trip March 1st-12th. We discussed that the distal screw has backed out significantly and the worry is that it may put enough pressure on the skin and become a wound especially as she is going away out of the country.   The patient has elected to proceed with Removal of hardware distal screw left femur. Risks and benefits of surgery to include but not limited to bleeding, infection, damage to surrounding structures, hardware failure, need for further surgery, pain, stiffness, blood clots. Informed consent was signed today in the office. The patient has met with our surgical schedulers and will be scheduled for Thursday 2/22/2024. All questions were answered.   Follow up 2 weeks after LESLY for wound check  New left femur xrays in 6 weeks (3 month visit)    Subjective:     Patient ID: Nadia Mata is a 63 y.o. female.  Chief Complaint:  HPI:  63 y.o. female presents to the office for evaluation status post  left femur ORIF performed on 01/06/2024 with Dr. Peacock at [facility] following a skiing accident. Since last visit, she has continued to work with PT with benefit. She presents ambulating with a cane. She has finished her lovenox script.     Allergy:  No Known Allergies  Medications:  all current active meds have been reviewed  Past Medical History:  No past medical history on  file.  Past Surgical History:  Past Surgical History:   Procedure Laterality Date    EYE SURGERY      FINGER SURGERY      KNEE ARTHROSCOPY      WISDOM TOOTH EXTRACTION       Family History:  Family History   Problem Relation Age of Onset    Osteoporosis Mother      Social History:  Social History     Substance and Sexual Activity   Alcohol Use Yes    Comment: socially     Social History     Substance and Sexual Activity   Drug Use Never    Comment: No drug use - As per [employer]      Social History     Tobacco Use   Smoking Status Never   Smokeless Tobacco Never           ROS:  General: Per HPI  Skin: Negative, except if noted below  HEENT: Negative  Respiratory: Negative  Cardiovascular: Negative  Gastrointestinal: Negative  Urinary: Negative  Vascular: Negative  Musculoskeletal: Positive per HPI   Neurologic: Positive per HPI  Endocrine: Negative    Objective:  BP Readings from Last 1 Encounters:   01/16/24 108/65      Wt Readings from Last 1 Encounters:   02/20/24 64 kg (141 lb)     Cardiovascular:   Palpable pulses, no peripheral cyanosis, RRR     Respiratory:   non-labored respirations, no shortness of breath    Lymphatics:  no palpable lymph nodes    Gait and Station:   Ambulates with the assistance of a walker    Neurologic:   Alert and oriented times 3  Patient with normal sensation except as noted below  Deep tendon reflexes 2+ except as noted in MSK exam    Bilateral Lower Extremity:  Left Hip     Inspection: Incisions healed    Range of Motion: no pain with hip ROM, limited due to stiffness; knee ROM 3-90 degrees    Palpable screw tenting lateral soft tissues    - log roll    - Trendelenburg sign    Motor: 5/5 Q/HS/TA/GS/P    Pulses: 2+ DP / 2+ PT    SILT DP/SP/S/S/TN    Imaging:  My interpretation XR AP pelvis/ left femur: retrograde IM nail for distal femur fracture in expected position with appropriate alignment and interval callus formation. Most distal screw is backing out significantly into the  "lateral soft tissues.     BMI:   Estimated body mass index is 24.98 kg/m² as calculated from the following:    Height as of this encounter: 5' 3\" (1.6 m).    Weight as of this encounter: 64 kg (141 lb).  BSA:   Estimated body surface area is 1.67 meters squared as calculated from the following:    Height as of this encounter: 5' 3\" (1.6 m).    Weight as of this encounter: 64 kg (141 lb).           John Elena MD    "

## 2024-02-21 RX ORDER — VIT C/B6/B5/MAGNESIUM/HERB 173 50-5-6-5MG
CAPSULE ORAL
COMMUNITY
End: 2024-02-22

## 2024-02-21 NOTE — PRE-PROCEDURE INSTRUCTIONS
Pre-Surgery Instructions:   Medication Instructions    clobetasol (TEMOVATE) 0.05 % external solution Hold day of surgery.    clobetasol (TEMOVATE) 0.05 % ointment Hold day of surgery.    Nutritional Supplements (VITAMIN D BOOSTER PO) Hold starting today    Turmeric (QC Tumeric Complex) 500 MG CAPS Hold starting today      Medication instructions for day surgery reviewed. Please use only a sip of water to take your instructed medications. Avoid all over the counter vitamins, supplements and NSAIDS starting today as we were unable to reach pt. Tylenol is ok to take as needed.     You will receive a call one business day prior to surgery with an arrival time and hospital directions. If your surgery is scheduled on a Monday, the hospital will be calling you on the Friday prior to your surgery. If you have not heard from anyone by 8pm, please call the hospital supervisor through the hospital  at [phone]. ([place] [phone] or [place] [phone]).    Do not eat or drink anything after midnight the night before your surgery, including candy, mints, lifesavers, or chewing gum. Do not drink alcohol 24hrs before your surgery. Try not to smoke at least 24hrs before your surgery.       Follow the pre surgery showering instructions as listed in the “My Surgical Experience Booklet” or otherwise provided by your surgeon's office. Do not use a blade to shave the surgical area 1 week before surgery. It is okay to use a clean electric clippers up to 24 hours before surgery. Do not apply any lotions, creams, including makeup, cologne, deodorant, or perfumes after showering on the day of your surgery. Do not use dry shampoo, hair spray, hair gel, or any type of hair products.     No contact lenses, eye make-up, or artificial eyelashes. Remove nail polish, including gel polish, and any artificial, gel, or acrylic nails if possible. Remove all jewelry including rings and body piercing jewelry.     Wear causal  clothing that is easy to take on and off. Consider your type of surgery.    Keep any valuables, jewelry, piercings at home. Please bring any specially ordered equipment (sling, braces) if indicated.    Arrange for a responsible person to drive you to and from the hospital on the day of your surgery. Please confirm the visitor policy for the day of your procedure when you receive your phone call with an arrival time.     Call the surgeon's office with any new illnesses, exposures, or additional questions prior to surgery.    Please reference your “My Surgical Experience Booklet” for additional information to prepare for your upcoming surgery.

## 2024-02-22 ENCOUNTER — APPOINTMENT (OUTPATIENT)
Dept: PHYSICAL THERAPY | Facility: MEDICAL CENTER | Age: 64
End: 2024-02-22
Payer: MEDICARE

## 2024-02-22 ENCOUNTER — ANESTHESIA (OUTPATIENT)
Age: 64
End: 2024-02-22
Payer: MEDICARE

## 2024-02-22 ENCOUNTER — HOSPITAL ENCOUNTER (OUTPATIENT)
Age: 64
Setting detail: OUTPATIENT SURGERY
Discharge: HOME | End: 2024-02-22
Attending: STUDENT IN AN ORGANIZED HEALTH CARE EDUCATION/TRAINING PROGRAM | Admitting: STUDENT IN AN ORGANIZED HEALTH CARE EDUCATION/TRAINING PROGRAM
Payer: MEDICARE

## 2024-02-22 ENCOUNTER — TELEPHONE (OUTPATIENT)
Age: 64
End: 2024-02-22

## 2024-02-22 ENCOUNTER — APPOINTMENT (OUTPATIENT)
Age: 64
End: 2024-02-22
Payer: MEDICARE

## 2024-02-22 VITALS
BODY MASS INDEX: 24.27 KG/M2 | HEART RATE: 57 BPM | TEMPERATURE: 98.9 F | WEIGHT: 137 LBS | SYSTOLIC BLOOD PRESSURE: 111 MMHG | DIASTOLIC BLOOD PRESSURE: 67 MMHG | HEIGHT: 63 IN | OXYGEN SATURATION: 99 % | RESPIRATION RATE: 14 BRPM

## 2024-02-22 DIAGNOSIS — T84.84XA PAINFUL ORTHOPAEDIC HARDWARE (HCC): Primary | ICD-10-CM

## 2024-02-22 PROCEDURE — 20680 REMOVAL OF IMPLANT DEEP: CPT | Performed by: STUDENT IN AN ORGANIZED HEALTH CARE EDUCATION/TRAINING PROGRAM

## 2024-02-22 RX ORDER — CHLORHEXIDINE GLUCONATE 4 G/100ML
SOLUTION TOPICAL DAILY PRN
Status: DISCONTINUED | OUTPATIENT
Start: 2024-02-22 | End: 2024-02-22 | Stop reason: HOSPADM

## 2024-02-22 RX ORDER — ONDANSETRON 2 MG/ML
4 INJECTION INTRAMUSCULAR; INTRAVENOUS ONCE AS NEEDED
Status: DISCONTINUED | OUTPATIENT
Start: 2024-02-22 | End: 2024-02-22 | Stop reason: HOSPADM

## 2024-02-22 RX ORDER — ONDANSETRON 2 MG/ML
INJECTION INTRAMUSCULAR; INTRAVENOUS AS NEEDED
Status: DISCONTINUED | OUTPATIENT
Start: 2024-02-22 | End: 2024-02-22

## 2024-02-22 RX ORDER — BUPIVACAINE HYDROCHLORIDE 5 MG/ML
INJECTION, SOLUTION EPIDURAL; INTRACAUDAL AS NEEDED
Status: DISCONTINUED | OUTPATIENT
Start: 2024-02-22 | End: 2024-02-22 | Stop reason: HOSPADM

## 2024-02-22 RX ORDER — AMOXICILLIN 250 MG
1 CAPSULE ORAL DAILY
Qty: 30 TABLET | Refills: 0 | Status: SHIPPED | OUTPATIENT
Start: 2024-02-22 | End: 2024-02-22

## 2024-02-22 RX ORDER — ACETAMINOPHEN 325 MG/1
975 TABLET ORAL EVERY 8 HOURS
Status: CANCELLED | OUTPATIENT
Start: 2024-02-22

## 2024-02-22 RX ORDER — SODIUM CHLORIDE, SODIUM LACTATE, POTASSIUM CHLORIDE, CALCIUM CHLORIDE 600; 310; 30; 20 MG/100ML; MG/100ML; MG/100ML; MG/100ML
INJECTION, SOLUTION INTRAVENOUS CONTINUOUS PRN
Status: DISCONTINUED | OUTPATIENT
Start: 2024-02-22 | End: 2024-02-22

## 2024-02-22 RX ORDER — LIDOCAINE HYDROCHLORIDE 10 MG/ML
INJECTION, SOLUTION EPIDURAL; INFILTRATION; INTRACAUDAL; PERINEURAL AS NEEDED
Status: DISCONTINUED | OUTPATIENT
Start: 2024-02-22 | End: 2024-02-22

## 2024-02-22 RX ORDER — SODIUM CHLORIDE, SODIUM LACTATE, POTASSIUM CHLORIDE, CALCIUM CHLORIDE 600; 310; 30; 20 MG/100ML; MG/100ML; MG/100ML; MG/100ML
125 INJECTION, SOLUTION INTRAVENOUS CONTINUOUS
Status: DISCONTINUED | OUTPATIENT
Start: 2024-02-22 | End: 2024-02-22 | Stop reason: HOSPADM

## 2024-02-22 RX ORDER — CEFEPIME HYDROCHLORIDE 2 G/50ML
INJECTION, SOLUTION INTRAVENOUS AS NEEDED
Status: DISCONTINUED | OUTPATIENT
Start: 2024-02-22 | End: 2024-02-22

## 2024-02-22 RX ORDER — PROPOFOL 10 MG/ML
INJECTION, EMULSION INTRAVENOUS AS NEEDED
Status: DISCONTINUED | OUTPATIENT
Start: 2024-02-22 | End: 2024-02-22

## 2024-02-22 RX ORDER — ONDANSETRON 4 MG/1
4 TABLET, ORALLY DISINTEGRATING ORAL EVERY 6 HOURS PRN
Qty: 20 TABLET | Refills: 0 | Status: SHIPPED | OUTPATIENT
Start: 2024-02-22 | End: 2024-02-22

## 2024-02-22 RX ORDER — ONDANSETRON 2 MG/ML
4 INJECTION INTRAMUSCULAR; INTRAVENOUS EVERY 6 HOURS PRN
Status: CANCELLED | OUTPATIENT
Start: 2024-02-22

## 2024-02-22 RX ORDER — OXYCODONE HYDROCHLORIDE 5 MG/1
5 TABLET ORAL EVERY 4 HOURS PRN
Qty: 10 TABLET | Refills: 0 | Status: SHIPPED | OUTPATIENT
Start: 2024-02-22 | End: 2024-02-22

## 2024-02-22 RX ORDER — OXYCODONE HYDROCHLORIDE 5 MG/1
5 TABLET ORAL EVERY 4 HOURS PRN
Qty: 10 TABLET | Refills: 0 | Status: SHIPPED | OUTPATIENT
Start: 2024-02-22 | End: 2024-02-23 | Stop reason: SDUPTHER

## 2024-02-22 RX ORDER — ACETAMINOPHEN 325 MG/1
975 TABLET ORAL ONCE
Status: COMPLETED | OUTPATIENT
Start: 2024-02-22 | End: 2024-02-22

## 2024-02-22 RX ORDER — DEXAMETHASONE SODIUM PHOSPHATE 10 MG/ML
INJECTION, SOLUTION INTRAMUSCULAR; INTRAVENOUS AS NEEDED
Status: DISCONTINUED | OUTPATIENT
Start: 2024-02-22 | End: 2024-02-22

## 2024-02-22 RX ORDER — ACETAMINOPHEN 500 MG
1000 TABLET ORAL EVERY 8 HOURS
Qty: 60 TABLET | Refills: 0 | Status: SHIPPED | OUTPATIENT
Start: 2024-02-22 | End: 2024-02-22

## 2024-02-22 RX ORDER — ONDANSETRON 4 MG/1
4 TABLET, ORALLY DISINTEGRATING ORAL EVERY 6 HOURS PRN
Qty: 20 TABLET | Refills: 0 | Status: SHIPPED | OUTPATIENT
Start: 2024-02-22

## 2024-02-22 RX ORDER — MIDAZOLAM HYDROCHLORIDE 2 MG/2ML
INJECTION, SOLUTION INTRAMUSCULAR; INTRAVENOUS AS NEEDED
Status: DISCONTINUED | OUTPATIENT
Start: 2024-02-22 | End: 2024-02-22

## 2024-02-22 RX ORDER — AMOXICILLIN 250 MG
1 CAPSULE ORAL DAILY
Qty: 30 TABLET | Refills: 0 | Status: SHIPPED | OUTPATIENT
Start: 2024-02-22

## 2024-02-22 RX ORDER — ACETAMINOPHEN 500 MG
1000 TABLET ORAL EVERY 8 HOURS
Qty: 60 TABLET | Refills: 0 | Status: SHIPPED | OUTPATIENT
Start: 2024-02-22 | End: 2024-02-24 | Stop reason: SDUPTHER

## 2024-02-22 RX ORDER — HYDROMORPHONE HCL IN WATER/PF 6 MG/30 ML
0.2 PATIENT CONTROLLED ANALGESIA SYRINGE INTRAVENOUS
Status: DISCONTINUED | OUTPATIENT
Start: 2024-02-22 | End: 2024-02-22 | Stop reason: HOSPADM

## 2024-02-22 RX ORDER — CEFAZOLIN SODIUM 2 G/50ML
2000 SOLUTION INTRAVENOUS ONCE
Status: DISCONTINUED | OUTPATIENT
Start: 2024-02-22 | End: 2024-02-22 | Stop reason: HOSPADM

## 2024-02-22 RX ORDER — CHLORHEXIDINE GLUCONATE ORAL RINSE 1.2 MG/ML
15 SOLUTION DENTAL ONCE
Status: COMPLETED | OUTPATIENT
Start: 2024-02-22 | End: 2024-02-22

## 2024-02-22 RX ORDER — FENTANYL CITRATE/PF 50 MCG/ML
25 SYRINGE (ML) INJECTION
Status: DISCONTINUED | OUTPATIENT
Start: 2024-02-22 | End: 2024-02-22 | Stop reason: HOSPADM

## 2024-02-22 RX ADMIN — SODIUM CHLORIDE, SODIUM LACTATE, POTASSIUM CHLORIDE, AND CALCIUM CHLORIDE 125 ML/HR: .6; .31; .03; .02 INJECTION, SOLUTION INTRAVENOUS at 08:11

## 2024-02-22 RX ADMIN — PROPOFOL 160 MG: 10 INJECTION, EMULSION INTRAVENOUS at 07:24

## 2024-02-22 RX ADMIN — DEXAMETHASONE SODIUM PHOSPHATE 10 MG: 10 INJECTION INTRAMUSCULAR; INTRAVENOUS at 07:34

## 2024-02-22 RX ADMIN — SODIUM CHLORIDE, SODIUM LACTATE, POTASSIUM CHLORIDE, AND CALCIUM CHLORIDE 125 ML/HR: .6; .31; .03; .02 INJECTION, SOLUTION INTRAVENOUS at 06:47

## 2024-02-22 RX ADMIN — CHLORHEXIDINE GLUCONATE 0.12% ORAL RINSE 15 ML: 1.2 LIQUID ORAL at 06:39

## 2024-02-22 RX ADMIN — CEFEPIME HYDROCHLORIDE 2000 MG: 2 INJECTION, SOLUTION INTRAVENOUS at 07:20

## 2024-02-22 RX ADMIN — ONDANSETRON 4 MG: 2 INJECTION INTRAMUSCULAR; INTRAVENOUS at 07:34

## 2024-02-22 RX ADMIN — ACETAMINOPHEN 325MG 975 MG: 325 TABLET ORAL at 06:35

## 2024-02-22 RX ADMIN — SODIUM CHLORIDE, SODIUM LACTATE, POTASSIUM CHLORIDE, AND CALCIUM CHLORIDE: .6; .31; .03; .02 INJECTION, SOLUTION INTRAVENOUS at 07:05

## 2024-02-22 RX ADMIN — FENTANYL CITRATE 25 MCG: 50 INJECTION INTRAMUSCULAR; INTRAVENOUS at 08:09

## 2024-02-22 RX ADMIN — MIDAZOLAM 2 MG: 1 INJECTION INTRAMUSCULAR; INTRAVENOUS at 07:21

## 2024-02-22 RX ADMIN — LIDOCAINE HYDROCHLORIDE 30 MG: 10 INJECTION, SOLUTION EPIDURAL; INFILTRATION; INTRACAUDAL; PERINEURAL at 07:24

## 2024-02-22 NOTE — DISCHARGE INSTR - AVS FIRST PAGE
Dr. Tanner Post op instructions    What to Expect/Activity  It is normal to have some discomfort in your knee for several days to weeks.  You are weight bearing as tolerated to your operative leg with assist devices.  Please use crutches/walker when ambulating until your follow-up  Swelling and discomfort in the knee is normal for several days after surgery. For the first 2-3 days, use ice around the knee to help. Use for 20-30 minutes every 1-2 hours for 48 hours, while awake. You may continue beyond 48 hours as needed.    Dressing/Wound Care/Bathing  There will be a surgical dressing over your incision that stays in place until follow-up unless water gets under the bandage and then it should be removed.   You may start showering 24 hours after surgery, the surgical dressing will remain in place. Please pat the dressing dry. If you notice the dressing appears saturated or is starting to come off, please replace with dry dressing.  You can keep the dressing in place until follow-up in the office.   Do not place any creams, ointments or gels on or around the incision.  No baths, swimming or submerging until cleared by Dr. Tanner    Pain Management/Medications  You may resume your usual medications.  Please take the following medications:  Anti-coagulation (blood clot prevention) - aspirin 81mg twice daily for 2 weeks  Pain medication:  Narcotic: Take as directed  NSAID/Anti-inflammatory: Take as directed  Tylenol 1000mg every 8 hours  Zofran (ondasetron) - 4mg every 8 hours as needed for nausea  Stool softeners (senna/colace) - take daily to prevent constipation as narcotic pain medication causes constipation  If you have questions or pain concerns, please contact the office. Pain medication cannot remove all post-operative pain.    Follow up/Call if:  The findings of your surgery will be explained to you and your family immediately after surgery. However, in the post-operative period, during recovery from  anesthesia you may not fully remember or fully understand what was said. This will be again gone over when you return for your post-op appointment.  Please contact Dr. Tanner's office if you experience the following:  Excessive bleeding (bleeding through your dressing)  Fever greater than 101 degrees F after 48 hours (low grade fevers the day or two after surgery are normal)  Persistent nausea or vomiting  Decreased sensation or discoloration of the operative limb  Pain or swelling that is getting worse and not better with medication    Dr. Tanner's Office Contact: [phone]

## 2024-02-22 NOTE — INTERVAL H&P NOTE
H&P reviewed. After examining the patient I find no changes in the patients condition since the H&P had been written. Plan for left femur hardware removal.

## 2024-02-22 NOTE — TELEPHONE ENCOUNTER
Caller: Patient    Doctor: Ciro    Reason for call:     Patient had surgery today for left femur removal of hardware, she has 4-5 medications sent to [id number] - [place] - today, but [facility] cannot fill the medications   Because their systems are down and not sure when they will be fixed.  She is asking to resubmit the scripts through another pharmacy, [facility] in [address] [place] [address].  She is asking for a call back once they have been sent.    Call back#: [phone]

## 2024-02-22 NOTE — TELEPHONE ENCOUNTER
Called and spoke w/pt and relayed SHYAM Green's msg.  She will call [facility] before having her   prescriptions.  Will CB w/any further questions/concerns.

## 2024-02-22 NOTE — TELEPHONE ENCOUNTER
I faxed two medications for her, aspirin and tylenol.  Thank you!  These are the only medications she should need.

## 2024-02-22 NOTE — TELEPHONE ENCOUNTER
Caller: Nadia    Doctor: Ciro    Reason for call: [facility] didn't receive the FAX RX. Please call [facility] with a verbal order    Call back#: [phone]  [facility] [address]

## 2024-02-22 NOTE — ANESTHESIA POSTPROCEDURE EVALUATION
Post-Op Assessment Note    CV Status:  Stable  Pain Score: 0    Pain management: adequate       Mental Status:  Alert and awake   Hydration Status:  Euvolemic   PONV Controlled:  Controlled   Airway Patency:  Patent     Post Op Vitals Reviewed: Yes    No anethesia notable event occurred.    Staff: Anesthesiologist, CRNA               BP   89/50   Temp   98.3   Pulse  56   Resp   15   SpO2   98

## 2024-02-23 ENCOUNTER — OFFICE VISIT (OUTPATIENT)
Dept: PHYSICAL THERAPY | Facility: MEDICAL CENTER | Age: 64
End: 2024-02-23
Payer: MEDICARE

## 2024-02-23 ENCOUNTER — APPOINTMENT (OUTPATIENT)
Dept: PHYSICAL THERAPY | Facility: CLINIC | Age: 64
End: 2024-02-23
Payer: MEDICARE

## 2024-02-23 DIAGNOSIS — G89.18 ACUTE POST-OPERATIVE PAIN: ICD-10-CM

## 2024-02-23 DIAGNOSIS — M25.662 KNEE STIFFNESS, LEFT: Primary | ICD-10-CM

## 2024-02-23 DIAGNOSIS — M25.652 HIP STIFFNESS, LEFT: ICD-10-CM

## 2024-02-23 PROCEDURE — 97112 NEUROMUSCULAR REEDUCATION: CPT

## 2024-02-23 PROCEDURE — 97110 THERAPEUTIC EXERCISES: CPT

## 2024-02-23 RX ORDER — OXYCODONE HYDROCHLORIDE 5 MG/1
5 TABLET ORAL EVERY 4 HOURS PRN
Qty: 15 TABLET | Refills: 0 | Status: SHIPPED | OUTPATIENT
Start: 2024-02-23 | End: 2024-03-04

## 2024-02-23 NOTE — OP NOTE
OPERATIVE REPORT  PATIENT NAME: Nadia Mata    :  1960  MRN: [id number]  Pt Location: [facility] OR ROOM 03    SURGERY DATE: 2024    Surgeons and Role:     * Viral Tanner,  - Primary    Preop Diagnosis:  Status post open reduction and internal fixation (ORIF) of fracture [Z98.890, Z87.81]    Post-Op Diagnosis Codes:     * Status post open reduction and internal fixation (ORIF) of fracture [Z98.890, Z87.81]    Procedure(s):  Left - REMOVAL HARDWARE    Specimen(s):  * No specimens in log *    Estimated Blood Loss:   Minimal    Drains:  * No LDAs found *    Anesthesia Type:   LMA    Operative Indications:  Status post open reduction and internal fixation (ORIF) of fracture [Z98.890, Z87.81]  63F status post left femur ORIF with retrograde IMN performed on 2024 with Dr. Peacock at [facility]. She has transitioned her care back to [place] as she was there on vacation. Her most recent x-rays demonstrate hardware failure of the distal interlock screw of her retrograde femoral nail.  There is not a locking cap distally in the nail.  Her distal interlock has backed out to the point where it is tenting her lateral soft tissues at her incision site.  This is worrisome for skin necrosis or wound breakdown due to the pressure. The patient has elected to proceed with Removal of hardware distal screw left femur. Risks and benefits of surgery to include but not limited to bleeding, infection, damage to surrounding structures, hardware failure, need for further surgery, pain, stiffness, blood clots.     Operative Findings:  Skin tenting at distal interlock screw  Loose distal interlock screw    Complications:   None    Procedure and Technique:  The patient was seen and examined in the preoperative holding area.  All questions were answered.  The operative consent was confirmed.  The left lower extremity was marked as the correct operative site.  Patient was brought to the operating room and transferred to  the operating room table supine.  All bony prominences were well-padded.  General anesthesia was induced with the use of an LMA.  Left lower extremity was prepped and draped in usual sterile fashion.  Patient was given 2 g of Ancef prior to incision.  Her distalmost interlock incision was incised sharply with a 10 blade over the prominent distal interlock head.  We dissected down through subcutaneous tissues and localized the head of the screw.  At this time we backed the screw out the rest of the way and removed it without complication.  The incision was irrigated with normal saline solution.  We closed the subcutaneous layers with 2-0 Vicryl suture.  We closed the skin with Dermabond.  All sponge and instrument counts were correct x 2.  A sterile dressing was placed followed by 6 inch Ace wrap.  Patient was awoken from anesthesia and taken recovery room in stable condition.       I was present for the entire procedure. and A qualified resident physician was not available.    Patient Disposition:  PACU     SIGNATURE: Viral Tanner DO  DATE: February 22, 2024  TIME: 7:14 PM

## 2024-02-23 NOTE — TELEPHONE ENCOUNTER
Patient came in, in a lot of pain in tears stating still have not received her medication for pain from her surgery yesterday. Patient would like a call back please explaining why this has not been resolve. I did explained to the patient from the message below medication was sent to [facility] pharmacy, but the patient does not understand why [facility] has not received the prescription to be filled.    Please assist patient and advised.     Thank you.

## 2024-02-24 DIAGNOSIS — T84.84XA PAINFUL ORTHOPAEDIC HARDWARE (HCC): ICD-10-CM

## 2024-02-24 RX ORDER — ACETAMINOPHEN 500 MG
1000 TABLET ORAL EVERY 8 HOURS
Qty: 60 TABLET | Refills: 0 | Status: SHIPPED | OUTPATIENT
Start: 2024-02-24 | End: 2024-02-24 | Stop reason: SDUPTHER

## 2024-02-24 RX ORDER — ACETAMINOPHEN 500 MG
1000 TABLET ORAL EVERY 8 HOURS
Qty: 60 TABLET | Refills: 0 | Status: SHIPPED | OUTPATIENT
Start: 2024-02-24

## 2024-02-24 NOTE — TELEPHONE ENCOUNTER
Caller: Diane    Doctor: Ciro    Reason for call: Pt calling because she still has not gotten her Tyl[facility]ol [facility]    Contacted PA on call via TT to have meds sent.  [facility]    Call back#: [phone]

## 2024-02-26 ENCOUNTER — APPOINTMENT (OUTPATIENT)
Dept: PHYSICAL THERAPY | Facility: CLINIC | Age: 64
End: 2024-02-26
Payer: MEDICARE

## 2024-02-26 ENCOUNTER — OFFICE VISIT (OUTPATIENT)
Dept: PHYSICAL THERAPY | Facility: MEDICAL CENTER | Age: 64
End: 2024-02-26
Payer: MEDICARE

## 2024-02-26 DIAGNOSIS — M25.652 HIP STIFFNESS, LEFT: ICD-10-CM

## 2024-02-26 DIAGNOSIS — G89.18 ACUTE POST-OPERATIVE PAIN: ICD-10-CM

## 2024-02-26 DIAGNOSIS — M25.662 KNEE STIFFNESS, LEFT: Primary | ICD-10-CM

## 2024-02-26 PROCEDURE — 97140 MANUAL THERAPY 1/> REGIONS: CPT

## 2024-02-26 PROCEDURE — 97110 THERAPEUTIC EXERCISES: CPT

## 2024-02-26 NOTE — PROGRESS NOTES
"Daily Note     Today's date: 2024  Patient name: Nadia Mata  : 1960  MRN: [id number]  Referring provider: Althea Greene CRNP  Dx:   Encounter Diagnosis     ICD-10-CM    1. Knee stiffness, left  M25.662       2. Hip stiffness, left  M25.652       3. Acute post-operative pain  G89.18                      Subjective: Pt reports that she is frustrated with her lack of mobility in her L LE.  Pt also notes fatigue and mm weakness in her R LE.      Objective: See treatment diary below      Assessment: Tolerated treatment well. Patient demonstrated fatigue post treatment, exhibited good technique with therapeutic exercises, and would benefit from continued PT  Pt's mobility improved w/ MHP application and was able to obtain approx 100 deg knee flexion post.  Pt requested manual PROM to obtain further knee flexion.         Plan: Continue per plan of care.      Precautions: s/p R distal femur ORIF      Ther Ex 2/5 2/8 2/12 2/15 2/19 2/23 2/26      Bike 10' 10'  Pedal  adj 10'  Pedal adj   (L2) 15'  Pedal  Adj  (L2) 10'  reg 10'  reg 10'  reg      Table knee flexion 5\"  x30 5\"  x30 5\"  x30 5\"  x30 5\"  x30 5\"  x30 5\"  x30      Heel slides 5\"  x30 5\"  x30 5\"  x30 5\"  x30 5\"  x30 5\"  x30 5\"  x30      Prone knee/quad str 3x30\" 3x30\" 3x30\" 3x30\" 3x30\" 3x30\" 3x30\"      Prone knee flexion x30 x30 3x12 3x12 3x15 3x15 1#  3x10      QS 5\"  x50 5\"  x50 5\"  x50 5\"  x50 5\"  x50 5\"  x50 5\"  x50      SLR flexion  3x10 3x10 3x10 3x12 3x15 3x15 1#  3x10      HS and gastroc str 3x30\" 3x30\" 3x30\" 3x30\" 3x30\" 3x30\" 3x30\"      SLR add, abd, ext   3x10 3x12 3x15 3x15 1#  3x10      LAQ    5\"  3x10 3x15  5\" 1#  3x10 1#  3x10      Standing hip abd     NV 3x10 3x12                                             Manuals:             PROM L LE       KO                                                                                                                                             x        Modalities 2/5 2/8 2/12 2/15 2/19 2/23 " 2/26       15' 15'  Pre tx 15'  Pre tx 15'  Pre tx 15'  pre 15'  Pre  10'  Pre tx

## 2024-02-28 ENCOUNTER — APPOINTMENT (OUTPATIENT)
Dept: PHYSICAL THERAPY | Facility: CLINIC | Age: 64
End: 2024-02-28
Payer: MEDICARE

## 2024-02-29 ENCOUNTER — OFFICE VISIT (OUTPATIENT)
Dept: PHYSICAL THERAPY | Facility: MEDICAL CENTER | Age: 64
End: 2024-02-29
Payer: MEDICARE

## 2024-02-29 DIAGNOSIS — M25.652 HIP STIFFNESS, LEFT: ICD-10-CM

## 2024-02-29 DIAGNOSIS — G89.18 ACUTE POST-OPERATIVE PAIN: ICD-10-CM

## 2024-02-29 DIAGNOSIS — M25.662 KNEE STIFFNESS, LEFT: Primary | ICD-10-CM

## 2024-02-29 PROCEDURE — 97110 THERAPEUTIC EXERCISES: CPT | Performed by: PHYSICAL THERAPIST

## 2024-02-29 PROCEDURE — 97112 NEUROMUSCULAR REEDUCATION: CPT | Performed by: PHYSICAL THERAPIST

## 2024-02-29 NOTE — PROGRESS NOTES
"Daily Note     Today's date: 2024  Patient name: Nadia Mata  : 1960  MRN: [id number]  Referring provider: Althea Greene CRNP  Dx:   Encounter Diagnosis     ICD-10-CM    1. Knee stiffness, left  M25.662       2. Acute post-operative pain  G89.18       3. Hip stiffness, left  M25.652                      Subjective: Pt reports that her knee is feeling much better since she had the screw removed.        Objective: See treatment diary below      Assessment: Tolerated treatment well. Patient demonstrated fatigue post treatment, exhibited good technique with therapeutic exercises, and would benefit from continued PT      Plan: Continue per plan of care.      Precautions: s/p R distal femur ORIF      Ther Ex 2/5 2/8 2/12 2/15 2/19 2/23 2/26 2/29     Bike 10' 10'  Pedal  adj 10'  Pedal adj   (L2) 15'  Pedal  Adj  (L2) 10'  reg 10'  reg 10'  reg 10'     Table knee flexion 5\"  x30 5\"  x30 5\"  x30 5\"  x30 5\"  x30 5\"  x30 5\"  x30 5\"  x30     Heel slides 5\"  x30 5\"  x30 5\"  x30 5\"  x30 5\"  x30 5\"  x30 5\"  x30 5\"  x30     Prone knee/quad str 3x30\" 3x30\" 3x30\" 3x30\" 3x30\" 3x30\" 3x30\" 3x30\"     Prone knee flexion x30 x30 3x12 3x12 3x15 3x15 1#  3x10 3x15     QS 5\"  x50 5\"  x50 5\"  x50 5\"  x50 5\"  x50 5\"  x50 5\"  x50 5\"  X50  2 ways     SLR flexion  3x10 3x10 3x10 3x12 3x15 3x15 1#  3x10 1#  3x15     HS and gastroc str 3x30\" 3x30\" 3x30\" 3x30\" 3x30\" 3x30\" 3x30\" 3x30\"     SLR add, abd, ext   3x10 3x12 3x15 3x15 1#  3x10 1#  3x15     LAQ    5\"  3x10 3x15  5\" 1#  3x10 1#  3x10 1#  3x15     Standing hip abd     NV 3x10 3x12 3x15                                            Manuals:            PROM L LE       KO HK                                                                                                                                            x        Modalities 2/5 2/8 2/12 2/15 2/19 2/23 2/26 2/29     MH 15' 15'  Pre tx 15'  Pre tx 15'  Pre tx 15'  pre 15'  Pre  10'  Pre tx 15'  W/LLLD knee flexion  str   "

## 2024-03-12 ENCOUNTER — OFFICE VISIT (OUTPATIENT)
Dept: PHYSICAL THERAPY | Facility: MEDICAL CENTER | Age: 64
End: 2024-03-12
Payer: MEDICARE

## 2024-03-12 ENCOUNTER — OFFICE VISIT (OUTPATIENT)
Dept: OBGYN CLINIC | Facility: MEDICAL CENTER | Age: 64
End: 2024-03-12

## 2024-03-12 ENCOUNTER — APPOINTMENT (OUTPATIENT)
Dept: RADIOLOGY | Facility: MEDICAL CENTER | Age: 64
End: 2024-03-12
Payer: MEDICARE

## 2024-03-12 VITALS
SYSTOLIC BLOOD PRESSURE: 100 MMHG | DIASTOLIC BLOOD PRESSURE: 66 MMHG | WEIGHT: 140 LBS | HEART RATE: 67 BPM | BODY MASS INDEX: 24.8 KG/M2 | HEIGHT: 63 IN

## 2024-03-12 DIAGNOSIS — Z47.89 AFTERCARE FOLLOWING SURGERY OF THE MUSCULOSKELETAL SYSTEM: ICD-10-CM

## 2024-03-12 DIAGNOSIS — M25.662 KNEE STIFFNESS, LEFT: Primary | ICD-10-CM

## 2024-03-12 DIAGNOSIS — Z47.89 AFTERCARE FOLLOWING SURGERY OF THE MUSCULOSKELETAL SYSTEM: Primary | ICD-10-CM

## 2024-03-12 DIAGNOSIS — G89.18 ACUTE POST-OPERATIVE PAIN: ICD-10-CM

## 2024-03-12 DIAGNOSIS — M25.652 HIP STIFFNESS, LEFT: ICD-10-CM

## 2024-03-12 DIAGNOSIS — T84.84XA PAINFUL ORTHOPAEDIC HARDWARE (HCC): ICD-10-CM

## 2024-03-12 PROCEDURE — 99024 POSTOP FOLLOW-UP VISIT: CPT | Performed by: STUDENT IN AN ORGANIZED HEALTH CARE EDUCATION/TRAINING PROGRAM

## 2024-03-12 PROCEDURE — 73552 X-RAY EXAM OF FEMUR 2/>: CPT

## 2024-03-12 PROCEDURE — 97110 THERAPEUTIC EXERCISES: CPT

## 2024-03-12 PROCEDURE — 97140 MANUAL THERAPY 1/> REGIONS: CPT

## 2024-03-12 NOTE — PROGRESS NOTES
"Daily Note     Today's date: 3/12/2024  Patient name: Nadia Mata  : 1960  MRN: [id number]  Referring provider: Althea Greene CRNP  Dx:   Encounter Diagnosis     ICD-10-CM    1. Knee stiffness, left  M25.662       2. Hip stiffness, left  M25.652       3. Acute post-operative pain  G89.18                      Subjective: Pt reports that she did a lot of walking while she was in [place] last week.  Pt states that she did pretty well, but was sore near the end of the week.         Objective: See treatment diary below      Assessment: Tolerated treatment well. Patient demonstrated fatigue post treatment, exhibited good technique with therapeutic exercises, and would benefit from continued PT        Plan: Continue per plan of care.      Precautions: s/p R distal femur ORIF      Ther Ex 2/5 2/8 2/12 2/15 2/19 2/23 2/26 2/29 3/12    Bike 10' 10'  Pedal  adj 10'  Pedal adj   (L2) 15'  Pedal  Adj  (L2) 10'  reg 10'  reg 10'  reg 10' 10'    Table knee flexion 5\"  x30 5\"  x30 5\"  x30 5\"  x30 5\"  x30 5\"  x30 5\"  x30 5\"  x30 5\"  x30    Heel slides 5\"  x30 5\"  x30 5\"  x30 5\"  x30 5\"  x30 5\"  x30 5\"  x30 5\"  x30 5\"  x30    Prone knee/quad str 3x30\" 3x30\" 3x30\" 3x30\" 3x30\" 3x30\" 3x30\" 3x30\" 3x30\"    Prone knee flexion x30 x30 3x12 3x12 3x15 3x15 1#  3x10 3x15 1#  3x15    QS 5\"  x50 5\"  x50 5\"  x50 5\"  x50 5\"  x50 5\"  x50 5\"  x50 5\"  X50  2 ways 5\"  X50  2 ways    SLR flexion  3x10 3x10 3x10 3x12 3x15 3x15 1#  3x10 1#  3x15 1#  3x15    HS and gastroc str 3x30\" 3x30\" 3x30\" 3x30\" 3x30\" 3x30\" 3x30\" 3x30\" 3x30\"    SLR add, abd, ext   3x10 3x12 3x15 3x15 1#  3x10 1#  3x15 1#  3x15    LAQ    5\"  3x10 3x15  5\" 1#  3x10 1#  3x10 1#  3x15 1#  3x15    Standing hip abd     NV 3x10 3x12 3x15 3x15                                           Manuals:       2/26 2/29 3/12    PROM L LE       KO HK KO                                                                                                                                           x      "   Modalities 2/5 2/8 2/12 2/15 2/19 2/23 2/26 2/29 3/12    MH 15' 15'  Pre tx 15'  Pre tx 15'  Pre tx 15'  pre 15'  Pre  10'  Pre tx 15'  W/LLLD knee flexion  str 15'

## 2024-03-12 NOTE — PROGRESS NOTES
Subjective: 63-year-old female presents today for her 2-week follow-up status post painful interlocking screw removal performed on 2/22/2024 from retrograde IM nail 1/6/2024 at [facility].  She recently returned from [place] stated that she did a lot of walking with dull achy pain, however it is improving and did get better after period of rest.  She still ambulating with the assistance of a cane.  She has been semicompliant with her home exercise program while away.  She denies any new injury or trauma.  She denies any numbness or tingling.    Physical Exam:  Incision: Appears clean and dry with no drainage or erythema  ROM: Full with minimal pain  5/5 IP/Q/HS/TA/GS, 2+ DP/PT, SILT DP/SP/S/S/TN    XR Left femur: Stable ORIF of distal femoral fracture, interlocking screw now removed. Interval callous formation.     Assessment/Plan:  2 week s/p removal of painful interlocking screw on 2/22/2024, retrograde IM nail performed on 1/6/2024    - continue multi-modal pain control   - Weight bearing status: WBAT  - DVT ppx: 81 mg aspirin for DVT prophylaxis  - Incision care: Keep incision clean and dry. 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

## 2024-03-14 ENCOUNTER — OFFICE VISIT (OUTPATIENT)
Dept: PHYSICAL THERAPY | Facility: MEDICAL CENTER | Age: 64
End: 2024-03-14
Payer: MEDICARE

## 2024-03-14 DIAGNOSIS — M25.662 KNEE STIFFNESS, LEFT: Primary | ICD-10-CM

## 2024-03-14 DIAGNOSIS — M25.652 HIP STIFFNESS, LEFT: ICD-10-CM

## 2024-03-14 DIAGNOSIS — G89.18 ACUTE POST-OPERATIVE PAIN: ICD-10-CM

## 2024-03-14 PROCEDURE — 97140 MANUAL THERAPY 1/> REGIONS: CPT | Performed by: PHYSICAL THERAPIST

## 2024-03-14 PROCEDURE — 97110 THERAPEUTIC EXERCISES: CPT | Performed by: PHYSICAL THERAPIST

## 2024-03-14 PROCEDURE — 97112 NEUROMUSCULAR REEDUCATION: CPT | Performed by: PHYSICAL THERAPIST

## 2024-03-14 NOTE — PROGRESS NOTES
"Daily Note     Today's date: 3/14/2024  Patient name: Nadia Mata  : 1960  MRN: [id number]  Referring provider: Althea Gerene CRNP  Dx:   Encounter Diagnosis     ICD-10-CM    1. Knee stiffness, left  M25.662       2. Hip stiffness, left  M25.652       3. 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        Objective: See treatment diary below      Assessment: Tolerated treatment well. Patient demonstrated fatigue post treatment, exhibited good technique with therapeutic exercises, and would benefit from continued PT.  Pt is progressing nicely in all areas.        Plan: Continue per plan of care.      Precautions: s/p R distal femur ORIF      Ther Ex 2/5 2/8 2/12 2/15 2/19 2/23 2/26 2/29 3/12 3/14   Bike 10' 10'  Pedal  adj 10'  Pedal adj   (L2) 15'  Pedal  Adj  (L2) 10'  reg 10'  reg 10'  reg 10' 10' 10'   Table knee flexion 5\"  x30 5\"  x30 5\"  x30 5\"  x30 5\"  x30 5\"  x30 5\"  x30 5\"  x30 5\"  x30 5\"  x30   Heel slides 5\"  x30 5\"  x30 5\"  x30 5\"  x30 5\"  x30 5\"  x30 5\"  x30 5\"  x30 5\"  x30 5\"  x30   Prone knee/quad str 3x30\" 3x30\" 3x30\" 3x30\" 3x30\" 3x30\" 3x30\" 3x30\" 3x30\" 3x30\"   Prone knee flexion x30 x30 3x12 3x12 3x15 3x15 1#  3x10 3x15 1#  3x15 1#  3x15   QS 5\"  x50 5\"  x50 5\"  x50 5\"  x50 5\"  x50 5\"  x50 5\"  x50 5\"  X50  2 ways 5\"  X50  2 ways 5\"  x50   SLR flexion  3x10 3x10 3x10 3x12 3x15 3x15 1#  3x10 1#  3x15 1#  3x15 1#  3x15   HS and gastroc str 3x30\" 3x30\" 3x30\" 3x30\" 3x30\" 3x30\" 3x30\" 3x30\" 3x30\" 3x30\"   SLR add, abd, ext   3x10 3x12 3x15 3x15 1#  3x10 1#  3x15 1#  3x15 1#  3x15   LAQ    5\"  3x10 3x15  5\" 1#  3x10 1#  3x10 1#  3x15 1#  3x15 1#  3x15   Standing hip abd     NV 3x10 3x12 3x15 3x15                                           Manuals:       2/26 2/29 3/12 3/14   PROM L LE       KO HK KO HK  Jt mobs                                                              "                                                                             x        Modalities 2/5 2/8 2/12 2/15 2/19 2/23 2/26 2/29 3/12 3/14   MH 15' 15'  Pre tx 15'  Pre tx 15'  Pre tx 15'  pre 15'  Pre  10'  Pre tx 15'  W/LLLD knee flexion  str 15' 15'  W/  LLLD knee flexion  str

## 2024-03-18 ENCOUNTER — OFFICE VISIT (OUTPATIENT)
Dept: PHYSICAL THERAPY | Facility: MEDICAL CENTER | Age: 64
End: 2024-03-18
Payer: MEDICARE

## 2024-03-18 DIAGNOSIS — G89.18 ACUTE POST-OPERATIVE PAIN: ICD-10-CM

## 2024-03-18 DIAGNOSIS — M25.652 HIP STIFFNESS, LEFT: ICD-10-CM

## 2024-03-18 DIAGNOSIS — M25.662 KNEE STIFFNESS, LEFT: Primary | ICD-10-CM

## 2024-03-18 PROCEDURE — 97110 THERAPEUTIC EXERCISES: CPT

## 2024-03-18 PROCEDURE — 97112 NEUROMUSCULAR REEDUCATION: CPT

## 2024-03-18 NOTE — PROGRESS NOTES
"Daily Note     Today's date: 3/18/2024  Patient name: Nadia Mata  : 1960  MRN: [id number]  Referring provider: Althea Greene CRNP  Dx:   Encounter Diagnosis     ICD-10-CM    1. Knee stiffness, left  M25.662       2. Hip stiffness, left  M25.652       3. 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        Objective: See treatment diary below      Assessment: Tolerated treatment well. Patient demonstrated fatigue post treatment, exhibited good technique with therapeutic exercises, and would benefit from continued PT  Pt's knee flexion mobility is improving, but must still concentrate to maintain a proper and fluid gait pattern when walking.  Inadvertently did not perform manuals today - reinstate again at [place].       Plan: Continue per plan of care.      Precautions: s/p R distal femur ORIF      Ther Ex 3/18            Bike 10'            Table knee flexion 5\"  x30            Heel slides 5\"  x30            Prone knee/quad str 3x30\"            Prone knee flexion 1#  3x15            QS 5\"  x50            SLR flexion  1#  3x15            HS and gastroc str 3x30\"            SLR add, abd, ext 1#  3x15            LAQ 2#  3x10            Standing hip abd 1#  3x15            Wall slides 3x10            Lateral lunges 2x10                         Manuals: 3/18            PROM L LE NP                                                                                                                                                           Modalities 3/18            MH 15'  W/  LLLD  Knee  Flex  str                                                "

## 2024-03-21 ENCOUNTER — OFFICE VISIT (OUTPATIENT)
Dept: PHYSICAL THERAPY | Facility: MEDICAL CENTER | Age: 64
End: 2024-03-21
Payer: MEDICARE

## 2024-03-21 DIAGNOSIS — M25.652 HIP STIFFNESS, LEFT: ICD-10-CM

## 2024-03-21 DIAGNOSIS — M25.662 KNEE STIFFNESS, LEFT: Primary | ICD-10-CM

## 2024-03-21 PROCEDURE — 97110 THERAPEUTIC EXERCISES: CPT

## 2024-03-21 PROCEDURE — 97112 NEUROMUSCULAR REEDUCATION: CPT

## 2024-03-21 NOTE — PROGRESS NOTES
"Daily Note     Today's date: 3/21/2024  Patient name: Nadia Mata  : 1960  MRN: [id number]  Referring provider: Althea Greene CRNP  Dx:   Encounter Diagnosis     ICD-10-CM    1. Knee stiffness, left  M25.662       2. Hip stiffness, left  M25.652                      Subjective: Pt reports that she was sore after LV and continues to have tightness in her knee.       Objective: See treatment diary below      Assessment: Tolerated treatment well. Patient demonstrated fatigue post treatment, exhibited good technique with therapeutic exercises, and would benefit from continued PT  Pt is making steady progress towards her goals.       Plan: Continue per plan of care.      Precautions: s/p R distal femur ORIF      Ther Ex 3/18 3/21           Bike 10' 10'           Table knee flexion 5\"  x30 5\"  x30           Heel slides 5\"  x30 5\"  x30           Prone knee/quad str 3x30\" 3x30\"           Prone knee flexion 1#  3x15 1#  3x15           QS 5\"  x50 5\"  x50           SLR flexion  1#  3x15 1#  3x15           HS and gastroc str 3x30\" 3x30\"           SLR add, abd, ext 1#  3x15 1#  3x15           LAQ 2#  3x10 2#  3x15           Standing hip abd 1#  3x15 1#  3x15           Wall slides 3x10 3x10           Lateral lunges 2x10 3x10                        Manuals: 3/18 3/21           PROM L LE NP KO                                                                                                                                                          Modalities 3/18 3/21           MH 15'  W/  LLLD  Knee  Flex  str 15'  W/  LLLD  Knee  Flex  str                                                 "

## 2024-03-22 ENCOUNTER — PATIENT MESSAGE (OUTPATIENT)
Dept: OBGYN CLINIC | Facility: MEDICAL CENTER | Age: 64
End: 2024-03-22

## 2024-03-25 ENCOUNTER — OFFICE VISIT (OUTPATIENT)
Dept: PHYSICAL THERAPY | Facility: MEDICAL CENTER | Age: 64
End: 2024-03-25
Payer: MEDICARE

## 2024-03-25 DIAGNOSIS — G89.18 ACUTE POST-OPERATIVE PAIN: ICD-10-CM

## 2024-03-25 DIAGNOSIS — M25.662 KNEE STIFFNESS, LEFT: ICD-10-CM

## 2024-03-25 DIAGNOSIS — M25.652 HIP STIFFNESS, LEFT: Primary | ICD-10-CM

## 2024-03-25 PROCEDURE — 97140 MANUAL THERAPY 1/> REGIONS: CPT

## 2024-03-25 PROCEDURE — 97112 NEUROMUSCULAR REEDUCATION: CPT

## 2024-03-25 PROCEDURE — 97110 THERAPEUTIC EXERCISES: CPT

## 2024-03-25 NOTE — PROGRESS NOTES
"Daily Note     Today's date: 3/25/2024  Patient name: Nadia Mata  : 1960  MRN: [id number]  Referring provider: Althea Greene CRNP  Dx:   Encounter Diagnosis     ICD-10-CM    1. Hip stiffness, left  M25.652       2. Knee stiffness, left  M25.662       3. Acute post-operative pain  G89.18                      Subjective: Pt reports that she feels as though she strained her adductor mm after LV.       Objective: See treatment diary below      Assessment: Tolerated treatment well. Patient demonstrated fatigue post treatment, exhibited good technique with therapeutic exercises, and would benefit from continued PT  Held SLR adduction and side lunges due to pt's adductor mm pain today.        Plan: Continue per plan of care.      Precautions: s/p R distal femur ORIF      Ther Ex 3/18 3/21 3/25          Bike 10' 10' 10'          Table knee flexion 5\"  x30 5\"  x30 5\"  x30          Heel slides 5\"  x30 5\"  x30 5\"  x30          Prone knee/quad str 3x30\" 3x30\" 3x30\"          Prone knee flexion 1#  3x15 1#  3x15 2#  3x10          QS 5\"  x50 5\"  x50 5\"  x50          SLR flexion  1#  3x15 1#  3x15 2#  3x10          HS and gastroc str 3x30\" 3x30\" 3x30\"          SLR add, abd, ext 1#  3x15 1#  3x15 2#  3x10  No  add          LAQ 2#  3x10 2#  3x15 4#  3x10            Standing hip abd 1#  3x15 1#  3x15 2#  3x10          Wall slides 3x10 3x10 3x10          Lateral lunges 2x10 3x10 NP                       Manuals: 3/18 3/21 3/25          PROM L LE NP KO KO                                                                                                                                                         Modalities 3/18 3/21 3/25          MH 15'  W/  LLLD  Knee  Flex  str 15'  W/  LLLD  Knee  Flex  str 15'  W/  TERT  In  flex                                                  "

## 2024-03-28 ENCOUNTER — OFFICE VISIT (OUTPATIENT)
Dept: PHYSICAL THERAPY | Facility: MEDICAL CENTER | Age: 64
End: 2024-03-28
Payer: MEDICARE

## 2024-03-28 DIAGNOSIS — M25.662 KNEE STIFFNESS, LEFT: Primary | ICD-10-CM

## 2024-03-28 DIAGNOSIS — G89.18 ACUTE POST-OPERATIVE PAIN: ICD-10-CM

## 2024-03-28 DIAGNOSIS — M25.652 HIP STIFFNESS, LEFT: ICD-10-CM

## 2024-03-28 PROCEDURE — 97140 MANUAL THERAPY 1/> REGIONS: CPT | Performed by: PHYSICAL THERAPIST

## 2024-03-28 PROCEDURE — 97110 THERAPEUTIC EXERCISES: CPT | Performed by: PHYSICAL THERAPIST

## 2024-03-28 NOTE — PROGRESS NOTES
"Daily Note     Today's date: 3/28/2024  Patient name: Nadia Mata  : 1960  MRN: [id number]  Referring provider: Althea Greene CRNP  Dx:   Encounter Diagnosis     ICD-10-CM    1. Knee stiffness, left  M25.662       2. Acute post-operative pain  G89.18       3. Hip stiffness, left  M25.652                      Subjective: Pt reports that she still get some pain when she tries to move too quickly.        Objective: See treatment diary below      Assessment: Tolerated treatment well. Patient demonstrated fatigue post treatment, exhibited good technique with therapeutic exercises, and would benefit from continued PT      Plan: Continue per plan of care.      Precautions: s/p R distal femur ORIF      Ther Ex 3/18 3/21 3/25 3/28         Bike 10' 10' 10' 10'         Table knee flexion 5\"  x30 5\"  x30 5\"  x30 5\"  x30         Heel slides 5\"  x30 5\"  x30 5\"  x30 5\"  x30         Prone knee/quad str 3x30\" 3x30\" 3x30\" 3x30\"         Prone knee flexion 1#  3x15 1#  3x15 2#  3x10 2#  3x15         QS 5\"  x50 5\"  x50 5\"  x50 5\"  x50         SLR flexion  1#  3x15 1#  3x15 2#  3x10 2#  3x15         HS and gastroc str 3x30\" 3x30\" 3x30\" 3x30\"         SLR add, abd, ext 1#  3x15 1#  3x15 2#  3x10  No  add 2#  3x15         LAQ 2#  3x10 2#  3x15 4#  3x10   4#  3x10         Standing hip abd 1#  3x15 1#  3x15 2#  3x10 2#  3x15         Wall slides 3x10 3x10 3x10 3x10         Lateral lunges 2x10 3x10 NP NP                      Manuals: 3/18 3/21 3/25 3/28         PROM L LE NP KO KO Ant tibial mobs   HK                                                                                                                                                        Modalities 3/18 3/21 3/25          MH 15'  W/  LLLD  Knee  Flex  str 15'  W/  LLLD  Knee  Flex  str 15'  W/  TERT  In  flex                                                    "

## 2024-04-01 ENCOUNTER — OFFICE VISIT (OUTPATIENT)
Dept: PHYSICAL THERAPY | Facility: MEDICAL CENTER | Age: 64
End: 2024-04-01
Payer: MEDICARE

## 2024-04-01 DIAGNOSIS — M25.662 KNEE STIFFNESS, LEFT: Primary | ICD-10-CM

## 2024-04-01 DIAGNOSIS — G89.18 ACUTE POST-OPERATIVE PAIN: ICD-10-CM

## 2024-04-01 DIAGNOSIS — M25.652 HIP STIFFNESS, LEFT: ICD-10-CM

## 2024-04-01 PROCEDURE — 97110 THERAPEUTIC EXERCISES: CPT

## 2024-04-01 PROCEDURE — 97140 MANUAL THERAPY 1/> REGIONS: CPT

## 2024-04-01 NOTE — PROGRESS NOTES
"Daily Note     Today's date: 2024  Patient name: Nadia Mata  : 1960  MRN: [id number]  Referring provider: Althea Greene CRNP  Dx:   Encounter Diagnosis     ICD-10-CM    1. Knee stiffness, left  M25.662       2. Acute post-operative pain  G89.18       3. Hip stiffness, left  M25.652                      Subjective: Pt reports that she always has some type of pain in her leg.       Objective: See treatment diary below      Assessment: Tolerated treatment well. Patient demonstrated fatigue post treatment, exhibited good technique with therapeutic exercises, and would benefit from continued PT  Pt's knee mobility is improving.       Plan: Continue per plan of care.      Precautions: s/p R distal femur ORIF      Ther Ex 3/18 3/21 3/25 3/28 4/1        Bike 10' 10' 10' 10' 10'        Table knee flexion 5\"  x30 5\"  x30 5\"  x30 5\"  x30 D/C        Heel slides 5\"  x30 5\"  x30 5\"  x30 5\"  x30 5\"  x30        Prone knee/quad str 3x30\" 3x30\" 3x30\" 3x30\" 3x30\"        Prone knee flexion 1#  3x15 1#  3x15 2#  3x10 2#  3x15 Stand  2#  3x15        QS 5\"  x50 5\"  x50 5\"  x50 5\"  x50 5\"  x50        SLR flexion  1#  3x15 1#  3x15 2#  3x10 2#  3x15 2#  3x15        HS and gastroc str 3x30\" 3x30\" 3x30\" 3x30\" 3x30\"        SLR add, abd, ext 1#  3x15 1#  3x15 2#  3x10  No  add 2#  3x15 2#  3x15        LAQ 2#  3x10 2#  3x15 4#  3x10   4#  3x10 4#  3x15        Standing hip abd 1#  3x15 1#  3x15 2#  3x10 2#  3x15 2#  3x15        Wall slides 3x10 3x10 3x10 3x10 3x10        Lateral lunges 2x10 3x10 NP NP NP                     Manuals: 3/18 3/21 3/25 3/28 4/1        PROM L LE NP KO KO Ant tibial mobs   HK PROM  KO                                                                                                                                                       Modalities 3/18 3/21 3/25  4/1        MH 15'  W/  LLLD  Knee  Flex  str 15'  W/  LLLD  Knee  Flex  str 15'  W/  TERT  In  flex  15'  W/  TERT  In  flex                   "

## 2024-04-05 ENCOUNTER — OFFICE VISIT (OUTPATIENT)
Dept: PHYSICAL THERAPY | Facility: MEDICAL CENTER | Age: 64
End: 2024-04-05
Payer: MEDICARE

## 2024-04-05 DIAGNOSIS — G89.18 ACUTE POST-OPERATIVE PAIN: ICD-10-CM

## 2024-04-05 DIAGNOSIS — M25.662 KNEE STIFFNESS, LEFT: Primary | ICD-10-CM

## 2024-04-05 DIAGNOSIS — M25.652 HIP STIFFNESS, LEFT: ICD-10-CM

## 2024-04-05 PROCEDURE — 97110 THERAPEUTIC EXERCISES: CPT

## 2024-04-05 PROCEDURE — 97140 MANUAL THERAPY 1/> REGIONS: CPT

## 2024-04-05 NOTE — PROGRESS NOTES
"Daily Note     Today's date: 2024  Patient name: Nadia Mata  : 1960  MRN: [id number]  Referring provider: Althea Greene CRNP  Dx:   Encounter Diagnosis     ICD-10-CM    1. Knee stiffness, left  M25.662       2. Acute post-operative pain  G89.18       3. Hip stiffness, left  M25.652                      Subjective: Pt reports that she feels her knee is bending better now, but always feels some level of discomfort in her L leg.       Objective: See treatment diary below      Assessment: Tolerated treatment well. Patient demonstrated fatigue post treatment, exhibited good technique with therapeutic exercises, and would benefit from continued PT   Pt is making steady progress in her strength and mobility.        Plan: Continue per plan of care.      Precautions: s/p R distal femur ORIF      Ther Ex 3/18 3/21 3/25 3/28 4/1 4/5       Bike 10' 10' 10' 10' 10' 10'       Table knee flexion 5\"  x30 5\"  x30 5\"  x30 5\"  x30 D/C D/C       Heel slides 5\"  x30 5\"  x30 5\"  x30 5\"  x30 5\"  x30 5\"  x30       Prone knee/quad str 3x30\" 3x30\" 3x30\" 3x30\" 3x30\" 3x30\"       Prone knee flexion 1#  3x15 1#  3x15 2#  3x10 2#  3x15 Stand  2#  3x15 Stand  2#  3x15       QS 5\"  x50 5\"  x50 5\"  x50 5\"  x50 5\"  x50 5\"  x50       SLR flexion  1#  3x15 1#  3x15 2#  3x10 2#  3x15 2#  3x15 3#  3x10       HS and gastroc str 3x30\" 3x30\" 3x30\" 3x30\" 3x30\" 3x30\"       SLR add, abd, ext 1#  3x15 1#  3x15 2#  3x10  No  add 2#  3x15 2#  3x15 3#  3x10       LAQ 2#  3x10 2#  3x15 4#  3x10   4#  3x10 4#  3x15 5#  3x10       Standing hip abd 1#  3x15 1#  3x15 2#  3x10 2#  3x15 2#  3x15 3#  3x10       Wall slides 3x10 3x10 3x10 3x10 3x10 3x10       Lateral lunges 2x10 3x10 NP NP NP NP                    Manuals: 3/18 3/21 3/25 3/28 4/1 4/5       PROM L LE NP KO KO Ant tibial mobs   HK PROM  KO PROM  KO                                                                                                                                                    "   Modalities 3/18 3/21 3/25  4/1 4/5       MH 15'  W/  LLLD  Knee  Flex  str 15'  W/  LLLD  Knee  Flex  str 15'  W/  TERT  In  flex  15'  W/  TERT  In  flex 15'  W/TERT  In flex

## 2024-04-08 ENCOUNTER — OFFICE VISIT (OUTPATIENT)
Dept: PHYSICAL THERAPY | Facility: MEDICAL CENTER | Age: 64
End: 2024-04-08
Payer: MEDICARE

## 2024-04-08 DIAGNOSIS — G89.18 ACUTE POST-OPERATIVE PAIN: ICD-10-CM

## 2024-04-08 DIAGNOSIS — M25.652 HIP STIFFNESS, LEFT: ICD-10-CM

## 2024-04-08 DIAGNOSIS — M25.662 KNEE STIFFNESS, LEFT: Primary | ICD-10-CM

## 2024-04-08 PROCEDURE — 97110 THERAPEUTIC EXERCISES: CPT | Performed by: PHYSICAL THERAPIST

## 2024-04-08 PROCEDURE — 97112 NEUROMUSCULAR REEDUCATION: CPT | Performed by: PHYSICAL THERAPIST

## 2024-04-08 NOTE — PROGRESS NOTES
"Daily Note     Today's date: 2024  Patient name: Nadia Mata  : 1960  MRN: [id number]  Referring provider: Althea Greene CRNP  Dx:   Encounter Diagnosis     ICD-10-CM    1. Knee stiffness, left  M25.662       2. Hip stiffness, left  M25.652       3. Acute post-operative pain  G89.18                      Subjective: Pt reports continued steady progress.  She still has pain when she wakes up in the morning.  The pain is in a different place nearly every day.        Objective: See treatment diary below      Assessment: Tolerated treatment well. Patient demonstrated fatigue post treatment, exhibited good technique with therapeutic exercises, and would benefit from continued PT      Plan: Continue per plan of care.      Precautions: s/p R distal femur ORIF      Ther Ex 3/18 3/21 3/25 3/28 4/1 4/5 4/8      Bike 10' 10' 10' 10' 10' 10' 10'      Table knee flexion 5\"  x30 5\"  x30 5\"  x30 5\"  x30 D/C D/C       Heel slides 5\"  x30 5\"  x30 5\"  x30 5\"  x30 5\"  x30 5\"  x30 5\"  x30      Prone knee/quad str 3x30\" 3x30\" 3x30\" 3x30\" 3x30\" 3x30\" 3x30\"      Prone knee flexion 1#  3x15 1#  3x15 2#  3x10 2#  3x15 Stand  2#  3x15 Stand  2#  3x15 Stand  3#  3x10      QS 5\"  x50 5\"  x50 5\"  x50 5\"  x50 5\"  x50 5\"  x50 5\"  x50      SLR flexion  1#  3x15 1#  3x15 2#  3x10 2#  3x15 2#  3x15 3#  3x10 3#  3x10      HS and gastroc str 3x30\" 3x30\" 3x30\" 3x30\" 3x30\" 3x30\" 3x30\"      SLR add, abd, ext 1#  3x15 1#  3x15 2#  3x10  No  add 2#  3x15 2#  3x15 3#  3x10 3#  3x10      LAQ 2#  3x10 2#  3x15 4#  3x10   4#  3x10 4#  3x15 5#  3x10 3x10  5#      Standing hip abd 1#  3x15 1#  3x15 2#  3x10 2#  3x15 2#  3x15 3#  3x10 3#  3x10      Wall slides 3x10 3x10 3x10 3x10 3x10 3x10 3x10      Lateral lunges 2x10 3x10 NP NP NP NP                    Manuals: 3/18 3/21 3/25 3/28 4/1 4/5 4/8      PROM L LE NP KO KO Ant tibial mobs   HK PROM  KO PROM  KO NP                                                                                              "                                                        Modalities 3/18 3/21 3/25  4/1 4/5 4/8      MH 15'  W/  LLLD  Knee  Flex  str 15'  W/  LLLD  Knee  Flex  str 15'  W/  TERT  In  flex  15'  W/  TERT  In  flex 15'  W/TERT  In flex 15'  W/  Tert  In flexion

## 2024-04-12 ENCOUNTER — OFFICE VISIT (OUTPATIENT)
Dept: PHYSICAL THERAPY | Facility: MEDICAL CENTER | Age: 64
End: 2024-04-12
Payer: MEDICARE

## 2024-04-12 DIAGNOSIS — G89.18 ACUTE POST-OPERATIVE PAIN: ICD-10-CM

## 2024-04-12 DIAGNOSIS — M25.662 KNEE STIFFNESS, LEFT: Primary | ICD-10-CM

## 2024-04-12 DIAGNOSIS — M25.652 HIP STIFFNESS, LEFT: ICD-10-CM

## 2024-04-12 PROCEDURE — 97140 MANUAL THERAPY 1/> REGIONS: CPT

## 2024-04-12 PROCEDURE — 97110 THERAPEUTIC EXERCISES: CPT

## 2024-04-12 NOTE — PROGRESS NOTES
"Daily Note     Today's date: 2024  Patient name: Nadia Mata  : 1960  MRN: [id number]  Referring provider: Althea Greene CRNP  Dx:   Encounter Diagnosis     ICD-10-CM    1. Knee stiffness, left  M25.662       2. Hip stiffness, left  M25.652       3. 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       Objective: See treatment diary below      Assessment: Tolerated treatment well. Patient demonstrated fatigue post treatment, exhibited good technique with therapeutic exercises, and would benefit from continued PT      Plan: Continue per plan of care.      Precautions: s/p R distal femur ORIF      Ther Ex 3/18 3/21 3/25 3/28 4/1 4/5 4/8 4/12     Bike 10' 10' 10' 10' 10' 10' 10' 10'     Table knee flexion 5\"  x30 5\"  x30 5\"  x30 5\"  x30 D/C D/C       Heel slides 5\"  x30 5\"  x30 5\"  x30 5\"  x30 5\"  x30 5\"  x30 5\"  x30 5\"  x30     Prone knee/quad str 3x30\" 3x30\" 3x30\" 3x30\" 3x30\" 3x30\" 3x30\" 3x30\"     Prone knee flexion 1#  3x15 1#  3x15 2#  3x10 2#  3x15 Stand  2#  3x15 Stand  2#  3x15 Stand  3#  3x10 Stand  3#  3x10     QS 5\"  x50 5\"  x50 5\"  x50 5\"  x50 5\"  x50 5\"  x50 5\"  x50 5\"  x50     SLR flexion  1#  3x15 1#  3x15 2#  3x10 2#  3x15 2#  3x15 3#  3x10 3#  3x10 3#  3x10     HS and gastroc str 3x30\" 3x30\" 3x30\" 3x30\" 3x30\" 3x30\" 3x30\" 3x30\"     SLR add, abd, ext 1#  3x15 1#  3x15 2#  3x10  No  add 2#  3x15 2#  3x15 3#  3x10 3#  3x10 3#  3x10  1#  Add  3x10     LAQ 2#  3x10 2#  3x15 4#  3x10   4#  3x10 4#  3x15 5#  3x10 3x10  5# 5#  3x10     Standing hip abd 1#  3x15 1#  3x15 2#  3x10 2#  3x15 2#  3x15 3#  3x10 3#  3x10 3#  3x10     Wall slides 3x10 3x10 3x10 3x10 3x10 3x10 3x10 3x10     Lateral lunges 2x10 3x10 NP NP NP NP       Step downs        L1  3x10     Manuals: 3/18 3/21 3/25 3/28 4/1 4/5 4/8 4/12     PROM L LE NP KO KO Ant " tibial mobs   HK PROM  KO PROM  KO NP KO                                                                                                                                                    Modalities 3/18 3/21 3/25  4/1 4/5 4/8 4/12     MH 15'  W/  LLLD  Knee  Flex  str 15'  W/  LLLD  Knee  Flex  str 15'  W/  TERT  In  flex  15'  W/  TERT  In  flex 15'  W/TERT  In flex 15'  W/  Tert  In flexion  15'  W/TERT  In flexion

## 2024-04-15 ENCOUNTER — OFFICE VISIT (OUTPATIENT)
Dept: PHYSICAL THERAPY | Facility: MEDICAL CENTER | Age: 64
End: 2024-04-15
Payer: MEDICARE

## 2024-04-15 DIAGNOSIS — M25.652 HIP STIFFNESS, LEFT: ICD-10-CM

## 2024-04-15 DIAGNOSIS — G89.18 ACUTE POST-OPERATIVE PAIN: ICD-10-CM

## 2024-04-15 DIAGNOSIS — M25.662 KNEE STIFFNESS, LEFT: Primary | ICD-10-CM

## 2024-04-15 PROCEDURE — 97140 MANUAL THERAPY 1/> REGIONS: CPT

## 2024-04-15 PROCEDURE — 97110 THERAPEUTIC EXERCISES: CPT

## 2024-04-15 NOTE — PROGRESS NOTES
"Daily Note     Today's date: 4/15/2024  Patient name: Nadia Mata  : 1960  MRN: [id number]  Referring provider: Althea Greene CRNP  Dx:   Encounter Diagnosis     ICD-10-CM    1. Knee stiffness, left  M25.662       2. Acute post-operative pain  G89.18       3. Hip stiffness, left  M25.652                      Subjective: Pt reports that her knee feels ok.        Objective: See treatment diary below      Assessment: Tolerated treatment well. Patient demonstrated fatigue post treatment, exhibited good technique with therapeutic exercises, and would benefit from continued PT  Pt's mobility and strength are making good progress.       Plan: Continue per plan of care.      Precautions: s/p R distal femur ORIF      Ther Ex 3/18 3/21 3/25 3/28 4/1 4/5 4/8 4/12 4/15    Bike 10' 10' 10' 10' 10' 10' 10' 10' 10'    Table knee flexion 5\"  x30 5\"  x30 5\"  x30 5\"  x30 D/C D/C       Heel slides 5\"  x30 5\"  x30 5\"  x30 5\"  x30 5\"  x30 5\"  x30 5\"  x30 5\"  x30 5\"  x30    Prone knee/quad str 3x30\" 3x30\" 3x30\" 3x30\" 3x30\" 3x30\" 3x30\" 3x30\" 3x30\"    Prone knee flexion 1#  3x15 1#  3x15 2#  3x10 2#  3x15 Stand  2#  3x15 Stand  2#  3x15 Stand  3#  3x10 Stand  3#  3x10 Stand  3#  3x10    QS 5\"  x50 5\"  x50 5\"  x50 5\"  x50 5\"  x50 5\"  x50 5\"  x50 5\"  x50 5\"  x50    SLR flexion  1#  3x15 1#  3x15 2#  3x10 2#  3x15 2#  3x15 3#  3x10 3#  3x10 3#  3x10 3#  3x12    HS and gastroc str 3x30\" 3x30\" 3x30\" 3x30\" 3x30\" 3x30\" 3x30\" 3x30\" 3x30\"    SLR add, abd, ext 1#  3x15 1#  3x15 2#  3x10  No  add 2#  3x15 2#  3x15 3#  3x10 3#  3x10 3#  3x10  1#  Add  3x10 3#  3x12  1#  Add  3x12    LAQ 2#  3x10 2#  3x15 4#  3x10   4#  3x10 4#  3x15 5#  3x10 3x10  5# 5#  3x10 5#  3x12    Standing hip abd 1#  3x15 1#  3x15 2#  3x10 2#  3x15 2#  3x15 3#  3x10 3#  3x10 3#  3x10 3#  3x12    Wall slides 3x10 3x10 3x10 3x10 3x10 3x10 3x10 3x10 3x10    Lateral lunges 2x10 3x10 NP NP NP NP       Step downs        L1  3x10 L1  3x10    Manuals: 3/18 3/21 3/25 3/28 4/1 " 4/5 4/8 4/12 4/15    PROM L LE NP KO KO Ant tibial mobs   HK PROM  KO PROM  KO NP KO KO                                                                                                                                                   Modalities 3/18 3/21 3/25  4/1 4/5 4/8 4/12 4/15    MH 15'  W/  LLLD  Knee  Flex  str 15'  W/  LLLD  Knee  Flex  str 15'  W/  TERT  In  flex  15'  W/  TERT  In  flex 15'  W/TERT  In flex 15'  W/  Tert  In flexion  15'  W/TERT  In flexion 15'  W/TERT  In flex

## 2024-04-16 ENCOUNTER — OFFICE VISIT (OUTPATIENT)
Dept: FAMILY MEDICINE CLINIC | Facility: CLINIC | Age: 64
End: 2024-04-16
Payer: MEDICARE

## 2024-04-16 VITALS
BODY MASS INDEX: 24.52 KG/M2 | DIASTOLIC BLOOD PRESSURE: 80 MMHG | TEMPERATURE: 96.9 F | HEART RATE: 70 BPM | WEIGHT: 138.4 LBS | SYSTOLIC BLOOD PRESSURE: 118 MMHG | OXYGEN SATURATION: 98 %

## 2024-04-16 DIAGNOSIS — Z00.00 MEDICARE ANNUAL WELLNESS VISIT, INITIAL: Primary | ICD-10-CM

## 2024-04-16 DIAGNOSIS — M04.8 OTHER AUTOINFLAMMATORY SYNDROMES (HCC): ICD-10-CM

## 2024-04-16 DIAGNOSIS — M85.859 OSTEOPENIA OF NECK OF FEMUR, UNSPECIFIED LATERALITY: ICD-10-CM

## 2024-04-16 DIAGNOSIS — Z13.29 SCREENING FOR THYROID DISORDER: ICD-10-CM

## 2024-04-16 DIAGNOSIS — S72.492D OTHER CLOSED FRACTURE OF DISTAL END OF LEFT FEMUR WITH ROUTINE HEALING, SUBSEQUENT ENCOUNTER: ICD-10-CM

## 2024-04-16 DIAGNOSIS — Z13.6 SCREENING FOR CARDIOVASCULAR CONDITION: ICD-10-CM

## 2024-04-16 DIAGNOSIS — M85.88 OSTEOPENIA OF OTHER SITE: ICD-10-CM

## 2024-04-16 DIAGNOSIS — E55.9 VITAMIN D DEFICIENCY: ICD-10-CM

## 2024-04-16 PROCEDURE — G0438 PPPS, INITIAL VISIT: HCPCS | Performed by: FAMILY MEDICINE

## 2024-04-16 PROCEDURE — 99214 OFFICE O/P EST MOD 30 MIN: CPT | Performed by: FAMILY MEDICINE

## 2024-04-16 NOTE — PATIENT INSTRUCTIONS
Please get your labwork done fasting.  Do not eat/drink for about 8-12 hours prior to getting the labwork done, however you may drink water or black coffee while you are fasting.  Please use a [facility] lab if possible, as we receive the lab results more quickly.  We will notify you of your labwork results even if they are normal.  Please contact us if you do not hear about your lab results after one week.  Low Carb Recommendations:  Please follow a low carbohydrate, high protein diet.  Videost[employer] is a good mick/computer program to keep food logs.    Your meals should be less than 30 grams of carbohydrates.    Your snacks should be less than 15 grams of carbohydrates.    You should drink at least 64 ounces of water daily.    You should walk at least 30 minutes daily.      Medicare Preventive Visit Patient Instructions  Thank you for completing your Welcome to Medicare Visit or Medicare Annual Wellness Visit today. Your next wellness visit will be due in one year (4/17/2025).  The screening/preventive services that you may require over the next 5-10 years are detailed below. Some tests may not apply to you based off risk factors and/or age. Screening tests ordered at today's visit but not completed yet may show as past due. Also, please note that scanned in results may not display below.  Preventive Screenings:  Service Recommendations Previous Testing/Comments   Colorectal Cancer Screening  * Colonoscopy    * Fecal Occult Blood Test (FOBT)/Fecal Immunochemical Test (FIT)  * Fecal DNA/Cologuard Test  * Flexible Sigmoidoscopy Age: 45-75 years old   Colonoscopy: every 10 years (may be performed more frequently if at higher risk)  OR  FOBT/FIT: every 1 year  OR  Cologuard: every 3 years  OR  Sigmoidoscopy: every 5 years  Screening may be recommended earlier than age 45 if at higher risk for colorectal cancer. Also, an individualized decision between you and your healthcare provider will decide whether screening  between the ages of 76-85 would be appropriate. Colonoscopy: 11/11/2020  FOBT/FIT: Not on file  Cologuard: Not on file  Sigmoidoscopy: Not on file    Screening Current     Breast Cancer Screening Age: 40+ years old  Frequency: every 1-2 years  Not required if history of left and right mastectomy Mammogram: 07/13/2023    Screening Current   Cervical Cancer Screening Between the ages of 21-29, pap smear recommended once every 3 years.   Between the ages of 30-65, can perform pap smear with HPV co-testing every 5 years.   Recommendations may differ for women with a history of total hysterectomy, cervical cancer, or abnormal pap smears in past. Pap Smear: 01/08/2018    Screening Not Indicated   Hepatitis C Screening Once for adults born between 1945 and 1965  More frequently in patients at high risk for Hepatitis C Hep C Antibody: Not on file    Screening Current   Diabetes Screening 1-2 times per year if you're at risk for diabetes or have pre-diabetes Fasting glucose: 92 mg/dL (6/27/2023)  A1C: 5.0 % (6/16/2022)  Screening Current   Cholesterol Screening Once every 5 years if you don't have a lipid disorder. May order more often based on risk factors. Lipid panel: 06/27/2023    Screening Current     Other Preventive Screenings Covered by Medicare:  Abdominal Aortic Aneurysm (AAA) Screening: covered once if your at risk. You're considered to be at risk if you have a family history of AAA.  Lung Cancer Screening: covers low dose CT scan once per year if you meet all of the following conditions: (1) Age 55-77; (2) No signs or symptoms of lung cancer; (3) Current smoker or have quit smoking within the last 15 years; (4) You have a tobacco smoking history of at least 20 pack years (packs per day multiplied by number of years you smoked); (5) You get a written order from a healthcare provider.  Glaucoma Screening: covered annually if you're considered high risk: (1) You have diabetes OR (2) Family history of glaucoma OR (3)   aged 50 and older OR (4)  American aged 65 and older  Osteoporosis Screening: covered every 2 years if you meet one of the following conditions: (1) You're estrogen deficient and at risk for osteoporosis based off medical history and other findings; (2) Have a vertebral abnormality; (3) On glucocorticoid therapy for more than 3 months; (4) Have primary hyperparathyroidism; (5) On osteoporosis medications and need to assess response to drug therapy.   Last bone density test (DXA Scan): 08/11/2022.  HIV Screening: covered annually if you're between the age of 15-65. Also covered annually if you are younger than 15 and older than 65 with risk factors for HIV infection. For pregnant patients, it is covered up to 3 times per pregnancy.    Immunizations:  Immunization Recommendations   Influenza Vaccine Annual influenza vaccination during flu season is recommended for all persons aged >= 6 months who do not have contraindications   Pneumococcal Vaccine   * Pneumococcal conjugate vaccine = PCV13 (Prevnar 13), PCV15 (Vaxneuvance), PCV20 (Prevnar 20)  * Pneumococcal polysaccharide vaccine = PPSV23 (Pneumovax) Adults 19-63 yo with certain risk factors or if 65+ yo  If never received any pneumonia vaccine: recommend Prevnar 20 (PCV20)  Give PCV20 if previously received 1 dose of PCV13 or PPSV23   Hepatitis B Vaccine 3 dose series if at intermediate or high risk (ex: diabetes, end stage renal disease, liver disease)   Respiratory syncytial virus (RSV) Vaccine - COVERED BY MEDICARE PART D  * RSVPreF3 (Arexvy) CDC recommends that adults 60 years of age and older may receive a single dose of RSV vaccine using shared clinical decision-making (SCDM)   Tetanus (Td) Vaccine - COST NOT COVERED BY MEDICARE PART B Following completion of primary series, a booster dose should be given every 10 years to maintain immunity against tetanus. Td may also be given as tetanus wound prophylaxis.   Tdap Vaccine - COST NOT  COVERED BY MEDICARE PART B Recommended at least once for all adults. For pregnant patients, recommended with each pregnancy.   Shingles Vaccine (Shingrix) - COST NOT COVERED BY MEDICARE PART B  2 shot series recommended in those 19 years and older who have or will have weakened immune systems or those 50 years and older     Health Maintenance Due:      Topic Date Due    Breast Cancer Screening: Mammogram  07/13/2024    HIV Screening  06/20/2025 (Originally 8/12/1975)    Hepatitis C Screening  07/20/2025 (Originally 1960)    Colorectal Cancer Screening  11/11/2030     Immunizations Due:      Topic Date Due    Pneumococcal Vaccine: 65+ Years (1 of 2 - PCV) Never done    COVID-19 Vaccine (1 - 2023-24 season) Never done     Advance Directives   What are advance directives?  Advance directives are legal documents that state your wishes and plans for medical care. These plans are made ahead of time in case you lose your ability to make decisions for yourself. Advance directives can apply to any medical decision, such as the treatments you want, and if you want to donate organs.   What are the types of advance directives?  There are many types of advance directives, and each state has rules about how to use them. You may choose a combination of any of the following:  Living will:  This is a written record of the treatment you want. You can also choose which treatments you do not want, which to limit, and which to stop at a certain time. This includes surgery, medicine, IV fluid, and tube feedings.   Durable power of  for healthcare (DPAHC):  This is a written record that states who you want to make healthcare choices for you when you are unable to make them for yourself. This person, called a proxy, is usually a family member or a friend. You may choose more than 1 proxy.  Do not resuscitate (DNR) order:  A DNR order is used in case your heart stops beating or you stop breathing. It is a request not to have  certain forms of treatment, such as CPR. A DNR order may be included in other types of advance directives.  Medical directive:  This covers the care that you want if you are in a coma, near death, or unable to make decisions for yourself. You can list the treatments you want for each condition. Treatment may include pain medicine, surgery, blood transfusions, dialysis, IV or tube feedings, and a ventilator (breathing machine).  Values history:  This document has questions about your views, beliefs, and how you feel and think about life. This information can help others choose the care that you would choose.  Why are advance directives important?  An advance directive helps you control your care. Although spoken wishes may be used, it is better to have your wishes written down. Spoken wishes can be misunderstood, or not followed. Treatments may be given even if you do not want them. An advance directive may make it easier for your family to make difficult choices about your care.   Fall Prevention    Fall prevention  includes ways to make your home and other areas safer. It also includes ways you can move more carefully to prevent a fall. Health conditions that cause changes in your blood pressure, vision, or muscle strength and coordination may increase your risk for falls. Medicines may also increase your risk for falls if they make you dizzy, weak, or sleepy.   Fall prevention tips:   Stand or sit up slowly.    Use assistive devices as directed.    Wear shoes that fit well and have soles that .    Wear a personal alarm.    Stay active.    Manage your medical conditions.    Home Safety Tips:  Add items to prevent falls in the bathroom.    Keep paths clear.    Install bright lights in your home.    Keep items you use often on shelves within reach.    Paint or place reflective tape on the edges of your stairs.       © Copyright [employer] 2018 Information is for End User's use only and may not be sold,  redistributed or otherwise used for commercial purposes. All illustrations and images included in CareNotes® are the copyrighted property of [employer] or [employer]      Medicare Preventive Visit Patient Instructions  Thank you for completing your Welcome to Medicare Visit or Medicare Annual Wellness Visit today. Your next wellness visit will be due in one year (4/17/2025).  The screening/preventive services that you may require over the next 5-10 years are detailed below. Some tests may not apply to you based off risk factors and/or age. Screening tests ordered at today's visit but not completed yet may show as past due. Also, please note that scanned in results may not display below.  Preventive Screenings:  Service Recommendations Previous Testing/Comments   Colorectal Cancer Screening  * Colonoscopy    * Fecal Occult Blood Test (FOBT)/Fecal Immunochemical Test (FIT)  * Fecal DNA/Cologuard Test  * Flexible Sigmoidoscopy Age: 45-75 years old   Colonoscopy: every 10 years (may be performed more frequently if at higher risk)  OR  FOBT/FIT: every 1 year  OR  Cologuard: every 3 years  OR  Sigmoidoscopy: every 5 years  Screening may be recommended earlier than age 45 if at higher risk for colorectal cancer. Also, an individualized decision between you and your healthcare provider will decide whether screening between the ages of 76-85 would be appropriate. Colonoscopy: 11/11/2020  FOBT/FIT: Not on file  Cologuard: Not on file  Sigmoidoscopy: Not on file    Screening Current     Breast Cancer Screening Age: 40+ years old  Frequency: every 1-2 years  Not required if history of left and right mastectomy Mammogram: 07/13/2023    Screening Current   Cervical Cancer Screening Between the ages of 21-29, pap smear recommended once every 3 years.   Between the ages of 30-65, can perform pap smear with HPV co-testing every 5 years.   Recommendations may differ for women with a history of total hysterectomy, cervical  cancer, or abnormal pap smears in past. Pap Smear: 01/08/2018    Screening Not Indicated   Hepatitis C Screening Once for adults born between 1945 and 1965  More frequently in patients at high risk for Hepatitis C Hep C Antibody: Not on file    Screening Current   Diabetes Screening 1-2 times per year if you're at risk for diabetes or have pre-diabetes Fasting glucose: 92 mg/dL (6/27/2023)  A1C: 5.0 % (6/16/2022)  Screening Current   Cholesterol Screening Once every 5 years if you don't have a lipid disorder. May order more often based on risk factors. Lipid panel: 06/27/2023    Screening Current     Other Preventive Screenings Covered by Medicare:  Abdominal Aortic Aneurysm (AAA) Screening: covered once if your at risk. You're considered to be at risk if you have a family history of AAA.  Lung Cancer Screening: covers low dose CT scan once per year if you meet all of the following conditions: (1) Age 55-77; (2) No signs or symptoms of lung cancer; (3) Current smoker or have quit smoking within the last 15 years; (4) You have a tobacco smoking history of at least 20 pack years (packs per day multiplied by number of years you smoked); (5) You get a written order from a healthcare provider.  Glaucoma Screening: covered annually if you're considered high risk: (1) You have diabetes OR (2) Family history of glaucoma OR (3)  aged 50 and older OR (4)  American aged 65 and older  Osteoporosis Screening: covered every 2 years if you meet one of the following conditions: (1) You're estrogen deficient and at risk for osteoporosis based off medical history and other findings; (2) Have a vertebral abnormality; (3) On glucocorticoid therapy for more than 3 months; (4) Have primary hyperparathyroidism; (5) On osteoporosis medications and need to assess response to drug therapy.   Last bone density test (DXA Scan): 08/11/2022.  HIV Screening: covered annually if you're between the age of 15-65. Also covered  annually if you are younger than 15 and older than 65 with risk factors for HIV infection. For pregnant patients, it is covered up to 3 times per pregnancy.    Immunizations:  Immunization Recommendations   Influenza Vaccine Annual influenza vaccination during flu season is recommended for all persons aged >= 6 months who do not have contraindications   Pneumococcal Vaccine   * Pneumococcal conjugate vaccine = PCV13 (Prevnar 13), PCV15 (Vaxneuvance), PCV20 (Prevnar 20)  * Pneumococcal polysaccharide vaccine = PPSV23 (Pneumovax) Adults 19-65 yo with certain risk factors or if 65+ yo  If never received any pneumonia vaccine: recommend Prevnar 20 (PCV20)  Give PCV20 if previously received 1 dose of PCV13 or PPSV23   Hepatitis B Vaccine 3 dose series if at intermediate or high risk (ex: diabetes, end stage renal disease, liver disease)   Respiratory syncytial virus (RSV) Vaccine - COVERED BY MEDICARE PART D  * RSVPreF3 (Arexvy) CDC recommends that adults 60 years of age and older may receive a single dose of RSV vaccine using shared clinical decision-making (SCDM)   Tetanus (Td) Vaccine - COST NOT COVERED BY MEDICARE PART B Following completion of primary series, a booster dose should be given every 10 years to maintain immunity against tetanus. Td may also be given as tetanus wound prophylaxis.   Tdap Vaccine - COST NOT COVERED BY MEDICARE PART B Recommended at least once for all adults. For pregnant patients, recommended with each pregnancy.   Shingles Vaccine (Shingrix) - COST NOT COVERED BY MEDICARE PART B  2 shot series recommended in those 19 years and older who have or will have weakened immune systems or those 50 years and older     Health Maintenance Due:      Topic Date Due    Breast Cancer Screening: Mammogram  07/13/2024    HIV Screening  06/20/2025 (Originally 8/12/1975)    Hepatitis C Screening  07/20/2025 (Originally 1960)    Colorectal Cancer Screening  11/11/2030     Immunizations Due:      Topic  Date Due    Pneumococcal Vaccine: 65+ Years (1 of 2 - PCV) Never done    COVID-19 Vaccine (1 - 2023-24 season) Never done     Advance Directives   What are advance directives?  Advance directives are legal documents that state your wishes and plans for medical care. These plans are made ahead of time in case you lose your ability to make decisions for yourself. Advance directives can apply to any medical decision, such as the treatments you want, and if you want to donate organs.   What are the types of advance directives?  There are many types of advance directives, and each state has rules about how to use them. You may choose a combination of any of the following:  Living will:  This is a written record of the treatment you want. You can also choose which treatments you do not want, which to limit, and which to stop at a certain time. This includes surgery, medicine, IV fluid, and tube feedings.   Durable power of  for healthcare (DPAHC):  This is a written record that states who you want to make healthcare choices for you when you are unable to make them for yourself. This person, called a proxy, is usually a family member or a friend. You may choose more than 1 proxy.  Do not resuscitate (DNR) order:  A DNR order is used in case your heart stops beating or you stop breathing. It is a request not to have certain forms of treatment, such as CPR. A DNR order may be included in other types of advance directives.  Medical directive:  This covers the care that you want if you are in a coma, near death, or unable to make decisions for yourself. You can list the treatments you want for each condition. Treatment may include pain medicine, surgery, blood transfusions, dialysis, IV or tube feedings, and a ventilator (breathing machine).  Values history:  This document has questions about your views, beliefs, and how you feel and think about life. This information can help others choose the care that you would  choose.  Why are advance directives important?  An advance directive helps you control your care. Although spoken wishes may be used, it is better to have your wishes written down. Spoken wishes can be misunderstood, or not followed. Treatments may be given even if you do not want them. An advance directive may make it easier for your family to make difficult choices about your care.   Fall Prevention    Fall prevention  includes ways to make your home and other areas safer. It also includes ways you can move more carefully to prevent a fall. Health conditions that cause changes in your blood pressure, vision, or muscle strength and coordination may increase your risk for falls. Medicines may also increase your risk for falls if they make you dizzy, weak, or sleepy.   Fall prevention tips:   Stand or sit up slowly.    Use assistive devices as directed.    Wear shoes that fit well and have soles that .    Wear a personal alarm.    Stay active.    Manage your medical conditions.    Home Safety Tips:  Add items to prevent falls in the bathroom.    Keep paths clear.    Install bright lights in your home.    Keep items you use often on shelves within reach.    Paint or place reflective tape on the edges of your stairs.       © Copyright [employer] 2018 Information is for End User's use only and may not be sold, redistributed or otherwise used for commercial purposes. All illustrations and images included in CareNotes® are the copyrighted property of [employer] or [employer]
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
## 2024-04-18 ENCOUNTER — OFFICE VISIT (OUTPATIENT)
Dept: PHYSICAL THERAPY | Facility: MEDICAL CENTER | Age: 64
End: 2024-04-18
Payer: MEDICARE

## 2024-04-18 DIAGNOSIS — M25.662 KNEE STIFFNESS, LEFT: Primary | ICD-10-CM

## 2024-04-18 DIAGNOSIS — G89.18 ACUTE POST-OPERATIVE PAIN: ICD-10-CM

## 2024-04-18 DIAGNOSIS — M25.652 HIP STIFFNESS, LEFT: ICD-10-CM

## 2024-04-18 PROCEDURE — 97110 THERAPEUTIC EXERCISES: CPT | Performed by: PHYSICAL THERAPIST

## 2024-04-18 PROCEDURE — 97112 NEUROMUSCULAR REEDUCATION: CPT | Performed by: PHYSICAL THERAPIST

## 2024-04-18 NOTE — PROGRESS NOTES
"   Daily Note     Today's date: 2024  Patient name: Nadia Mata  : 1960  MRN: [id number]  Referring provider: Althea Greene CRNP  Dx:   Encounter Diagnosis     ICD-10-CM    1. Knee stiffness, left  M25.662       2. Hip stiffness, left  M25.652       3. Acute post-operative pain  G89.18                      Subjective: Pt reports that her knee is stiff, but it is improving.       Objective: See treatment diary below      Assessment: Tolerated treatment well. Patient demonstrated fatigue post treatment, exhibited good technique with therapeutic exercises, and would benefit from continued PT      Plan: Continue per plan of care.      Precautions: s/p R distal femur ORIF      Ther Ex 3/18 3/21 3/25 3/28 4/1 4/5 4/8 4/12 4/15 4/18   Bike 10' 10' 10' 10' 10' 10' 10' 10' 10' 10'   Table knee flexion 5\"  x30 5\"  x30 5\"  x30 5\"  x30 D/C D/C       Heel slides 5\"  x30 5\"  x30 5\"  x30 5\"  x30 5\"  x30 5\"  x30 5\"  x30 5\"  x30 5\"  x30 5\"  x30   Prone knee/quad str 3x30\" 3x30\" 3x30\" 3x30\" 3x30\" 3x30\" 3x30\" 3x30\" 3x30\" 3x30\"   Prone knee flexion 1#  3x15 1#  3x15 2#  3x10 2#  3x15 Stand  2#  3x15 Stand  2#  3x15 Stand  3#  3x10 Stand  3#  3x10 Stand  3#  3x10 Stand  33  3x10   QS 5\"  x50 5\"  x50 5\"  x50 5\"  x50 5\"  x50 5\"  x50 5\"  x50 5\"  x50 5\"  x50 5\"  x50   SLR flexion  1#  3x15 1#  3x15 2#  3x10 2#  3x15 2#  3x15 3#  3x10 3#  3x10 3#  3x10 3#  3x12 3#  3x15   HS and gastroc str 3x30\" 3x30\" 3x30\" 3x30\" 3x30\" 3x30\" 3x30\" 3x30\" 3x30\" 3x30\"   SLR add, abd, ext 1#  3x15 1#  3x15 2#  3x10  No  add 2#  3x15 2#  3x15 3#  3x10 3#  3x10 3#  3x10  1#  Add  3x10 3#  3x12  1#  Add  3x12 3#  3x15  1#  Add  3x15   LP          80#  3x10   LAQ 2#  3x10 2#  3x15 4#  3x10   4#  3x10 4#  3x15 5#  3x10 3x10  5# 5#  3x10 5#  3x12 5#  3x15   Standing hip abd 1#  3x15 1#  3x15 2#  3x10 2#  3x15 2#  3x15 3#  3x10 3#  3x10 3#  3x10 3#  3x12 3#  3x15   Wall slides 3x10 3x10 3x10 3x10 3x10 3x10 3x10 3x10 3x10    Lateral lunges 2x10 3x10 NP " NP NP NP       Step downs        L1  3x10 L1  3x10 L3  3x10   Manuals: 3/18 3/21 3/25 3/28 4/1 4/5 4/8 4/12 4/15 4/18   PROM L LE NP KO KO Ant tibial mobs   HK PROM  KO PROM  KO NP KO KO NP                                                                                                                                                  Modalities 3/18 3/21 3/25  4/1 4/5 4/8 4/12 4/15 4/18   MH 15'  W/  LLLD  Knee  Flex  str 15'  W/  LLLD  Knee  Flex  str 15'  W/  TERT  In  flex  15'  W/  TERT  In  flex 15'  W/TERT  In flex 15'  W/  Tert  In flexion  15'  W/TERT  In flexion 15'  W/TERT  In flex 15'

## 2024-04-22 ENCOUNTER — OFFICE VISIT (OUTPATIENT)
Dept: PHYSICAL THERAPY | Facility: MEDICAL CENTER | Age: 64
End: 2024-04-22
Payer: MEDICARE

## 2024-04-22 DIAGNOSIS — M25.652 HIP STIFFNESS, LEFT: ICD-10-CM

## 2024-04-22 DIAGNOSIS — G89.18 ACUTE POST-OPERATIVE PAIN: ICD-10-CM

## 2024-04-22 DIAGNOSIS — M25.662 KNEE STIFFNESS, LEFT: Primary | ICD-10-CM

## 2024-04-22 PROCEDURE — 97112 NEUROMUSCULAR REEDUCATION: CPT

## 2024-04-22 PROCEDURE — 97110 THERAPEUTIC EXERCISES: CPT

## 2024-04-22 NOTE — PROGRESS NOTES
"Daily Note     Today's date: 2024  Patient name: Nadia Mata  : 1960  MRN: [id number]  Referring provider: Althea Greene CRNP  Dx:   Encounter Diagnosis     ICD-10-CM    1. Knee stiffness, left  M25.662       2. Hip stiffness, left  M25.652       3. Acute post-operative pain  G89.18                      Subjective: Pt feels as though she is walking better now.       Objective: See treatment diary below      Assessment: Tolerated treatment well. Patient demonstrated fatigue post treatment, exhibited good technique with therapeutic exercises, and would benefit from continued PT  Pt is making good progress in her knee flexion mobility.       Plan: Continue per plan of care.      Precautions: s/p R distal femur ORIF      Ther Ex             Bike 10'            Table knee flexion 5\"  x30            Heel slides 5\"  x30            Prone knee/quad str 3x30\"            Prone knee flexion 3#  3x15            QS 5\"  x30            SLR flexion  3#  3x15            HS and gastroc str 3x30\"            SLR add, abd, ext 3#  3x15  1#  Add  3x15            LP 80#  3x10            LAQ 5#  3x15            Standing hip abd 3#  3x15            Wall slides 3x10            Lateral lunges NP            Step downs L3  3x10            Manuals:             PROM L LE KO D/C                                                                                                                                                          Modalities             MH 15'                                                                      "

## 2024-04-23 ENCOUNTER — OFFICE VISIT (OUTPATIENT)
Dept: OBGYN CLINIC | Facility: MEDICAL CENTER | Age: 64
End: 2024-04-23

## 2024-04-23 ENCOUNTER — APPOINTMENT (OUTPATIENT)
Dept: RADIOLOGY | Facility: MEDICAL CENTER | Age: 64
End: 2024-04-23
Payer: MEDICARE

## 2024-04-23 VITALS
HEART RATE: 62 BPM | SYSTOLIC BLOOD PRESSURE: 112 MMHG | HEIGHT: 63 IN | DIASTOLIC BLOOD PRESSURE: 75 MMHG | WEIGHT: 138 LBS | BODY MASS INDEX: 24.45 KG/M2

## 2024-04-23 DIAGNOSIS — Z47.89 AFTERCARE FOLLOWING SURGERY OF THE MUSCULOSKELETAL SYSTEM: ICD-10-CM

## 2024-04-23 DIAGNOSIS — Z47.89 AFTERCARE FOLLOWING SURGERY OF THE MUSCULOSKELETAL SYSTEM: Primary | ICD-10-CM

## 2024-04-23 PROCEDURE — 99024 POSTOP FOLLOW-UP VISIT: CPT | Performed by: STUDENT IN AN ORGANIZED HEALTH CARE EDUCATION/TRAINING PROGRAM

## 2024-04-23 PROCEDURE — 73552 X-RAY EXAM OF FEMUR 2/>: CPT

## 2024-04-23 NOTE — PROGRESS NOTES
Subjective:63-year-old female who presents to the office today 8 weeks s/p painful interlocking screw removal performed on 2/22/2024 from retrograde IM nail 1/6/2024 at OSH.  Patient seen and examined. Pain controlled. Progressing well. Incisions well healed.    Physical Exam:  Incision: well healed  ROM: full   5/5 IP/Q/HS/TA/GS, 2+ DP/PT, SILT DP/SP/S/S/TN    XR left femur: healed distal femoral fracture with stable hardware.    Assessment/Plan:  8 weeks s/p painful interlocking screw removal performed on 2/22/2024 from retrograde IM nail 1/6/2024 at OSH. The patient states she is doing well. She has been attending therapy and making progress. She notes intermittent achy pain that overall, is better than it was. She has returned to umpiring  . She has not been taking anything OTC for pain.     - continue multi-modal pain control   - Weight bearing status: weightbearing as tolerated  - DVT ppx: ambulation   - at this time, she may return to activities as tolerated  - no restrictions at this time  - PT/OT  - F/U as needed    Scribe Attestation      I,:  Silke Benton MA am acting as a scribe while in the presence of the attending physician.:       I,:  Viral Tanner DO personally performed the services described in this documentation    as scribed in my presence.:

## 2024-04-25 ENCOUNTER — OFFICE VISIT (OUTPATIENT)
Dept: PHYSICAL THERAPY | Facility: MEDICAL CENTER | Age: 64
End: 2024-04-25
Payer: MEDICARE

## 2024-04-25 DIAGNOSIS — M25.662 KNEE STIFFNESS, LEFT: Primary | ICD-10-CM

## 2024-04-25 DIAGNOSIS — M25.652 HIP STIFFNESS, LEFT: ICD-10-CM

## 2024-04-25 PROCEDURE — 97112 NEUROMUSCULAR REEDUCATION: CPT | Performed by: PHYSICAL THERAPIST

## 2024-04-25 PROCEDURE — 97110 THERAPEUTIC EXERCISES: CPT | Performed by: PHYSICAL THERAPIST

## 2024-04-25 NOTE — PROGRESS NOTES
"Daily Note     Today's date: 2024  Patient name: Nadia Mata  : 1960  MRN: [id number]  Referring provider: Althea Greene CRNP  Dx:   Encounter Diagnosis     ICD-10-CM    1. Knee stiffness, left  M25.662       2. Hip stiffness, left  M25.652       3. Acute post-operative pain  G89.18                      Subjective: Pt reports that she went to see the surgeon and had x-rays taken.  Her fracture is completely healed.        Objective: See treatment diary below      Assessment: Tolerated treatment well. Patient demonstrated fatigue post treatment, exhibited good technique with therapeutic exercises, and would benefit from continued PT      Plan: Continue per plan of care.      Precautions: s/p R distal femur ORIF      Ther Ex            Bike 10' 10'           Table knee flexion 5\"  x30 D/C           Heel slides 5\"  x30 5\"  x30           Prone knee/quad str 3x30\" 3x30\"           Prone knee flexion 3#  3x15 3#  3x15           QS 5\"  x30 5\"  x30           SLR flexion  3#  3x15 3#  3x15           HS and gastroc str 3x30\" 3x30\"           SLR add, abd, ext 3#  3x15  1#  Add  3x15 3#  3x15  1#  Add  3x15           LP 80#  3x10 100#  3x15           LAQ 5#  3x15 5#  3x15           Standing hip abd 3#  3x15 3#  3x15           Wall slides 3x10 3x10           Lateral lunges NP NP           Step downs L3  3x10 L2  AE  3x15           Manuals:             PROM L LE KO D/C                                                                                                                                                          Modalities             15'   15'                                                                     "

## 2024-04-29 ENCOUNTER — OFFICE VISIT (OUTPATIENT)
Dept: PHYSICAL THERAPY | Facility: MEDICAL CENTER | Age: 64
End: 2024-04-29
Payer: MEDICARE

## 2024-04-29 DIAGNOSIS — M25.652 HIP STIFFNESS, LEFT: ICD-10-CM

## 2024-04-29 DIAGNOSIS — G89.18 ACUTE POST-OPERATIVE PAIN: ICD-10-CM

## 2024-04-29 DIAGNOSIS — M25.662 KNEE STIFFNESS, LEFT: Primary | ICD-10-CM

## 2024-04-29 PROCEDURE — 97112 NEUROMUSCULAR REEDUCATION: CPT

## 2024-04-29 PROCEDURE — 97110 THERAPEUTIC EXERCISES: CPT

## 2024-04-29 NOTE — PROGRESS NOTES
"Daily Note     Today's date: 2024  Patient name: Nadia Mata  : 1960  MRN: [id number]  Referring provider: Althea Greene CRNP  Dx:   Encounter Diagnosis     ICD-10-CM    1. Knee stiffness, left  M25.662       2. Hip stiffness, left  M25.652       3. Acute post-operative pain  G89.18                      Subjective: Pt reports that her leg is feeling stronger and feels as though she is walking better.  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       Objective: See treatment diary below      Assessment: Tolerated treatment well. Patient demonstrated fatigue post treatment, exhibited good technique with therapeutic exercises, and would benefit from continued PT      Plan: Continue per plan of care.      Precautions: s/p R distal femur ORIF      Ther Ex           Bike 10' 10' 10'          Table knee flexion 5\"  x30 D/C D/C          Heel slides 5\"  x30 5\"  x30 5\"  x30          Prone knee/quad str 3x30\" 3x30\" 3x30\"          Prone knee flexion 3#  3x15 3#  3x15 3#  3x15          QS 5\"  x30 5\"  x30 5\"  x30          SLR flexion  3#  3x15 3#  3x15 3#  3x15          HS and gastroc str 3x30\" 3x30\" 3x30\"          SLR add, abd, ext 3#  3x15  1#  Add  3x15 3#  3x15  1#  Add  3x15 3#  3x15  1#  Add  3x15          LP 80#  3x10 100#  3x15 120#  3x10          LAQ 5#  3x15 5#  3x15 5#  3x15          Standing hip abd 3#  3x15 3#  3x15 3#  3x15          Wall slides 3x10 3x10 3x10          Lateral lunges NP NP NP          Step downs L3  3x10 L2  AE  3x15 L2  AE  3x15          Manuals:             PROM L LE KO D/C                                                                                                                                                          Modalities             15'   15'                                                                       "

## 2024-05-02 ENCOUNTER — OFFICE VISIT (OUTPATIENT)
Dept: PHYSICAL THERAPY | Facility: MEDICAL CENTER | Age: 64
End: 2024-05-02
Payer: MEDICARE

## 2024-05-02 DIAGNOSIS — M25.662 KNEE STIFFNESS, LEFT: Primary | ICD-10-CM

## 2024-05-02 DIAGNOSIS — M25.652 HIP STIFFNESS, LEFT: ICD-10-CM

## 2024-05-02 DIAGNOSIS — G89.18 ACUTE POST-OPERATIVE PAIN: ICD-10-CM

## 2024-05-02 PROCEDURE — 97110 THERAPEUTIC EXERCISES: CPT | Performed by: PHYSICAL THERAPIST

## 2024-05-02 PROCEDURE — 97112 NEUROMUSCULAR REEDUCATION: CPT | Performed by: PHYSICAL THERAPIST

## 2024-05-02 NOTE — PROGRESS NOTES
"Daily Note     Today's date: 2024  Patient name: Nadia Mata  : 1960  MRN: [id number]  Referring provider: Althea Greene CRNP  Dx:   Encounter Diagnosis     ICD-10-CM    1. Knee stiffness, left  M25.662       2. Acute post-operative pain  G89.18       3. Hip stiffness, left  M25.652                      Subjective: Pt reports continued progress.        Objective: See treatment diary below      Assessment: Tolerated treatment well. Patient demonstrated fatigue post treatment, exhibited good technique with therapeutic exercises, and would benefit from continued PT.  Pt is progressing nicely in all areas.        Plan: Continue per plan of care.      Precautions: s/p R distal femur ORIF      Ther Ex           Bike 10' 10' 10'          Table knee flexion 5\"  x30 D/C D/C          Heel slides 5\"  x30 5\"  x30 5\"  x30          Prone knee/quad str 3x30\" 3x30\" 3x30\"          Prone knee flexion 3#  3x15 3#  3x15 3#  3x15          QS 5\"  x30 5\"  x30 5\"  x30          SLR flexion  3#  3x15 3#  3x15 3#  3x15          HS and gastroc str 3x30\" 3x30\" 3x30\"          SLR add, abd, ext 3#  3x15  1#  Add  3x15 3#  3x15  1#  Add  3x15 3#  3x15  1#  Add  3x15          LP 80#  3x10 100#  3x15 120#  3x10          LAQ 5#  3x15 5#  3x15 5#  3x15          Standing hip abd 3#  3x15 3#  3x15 3#  3x15          Wall slides 3x10 3x10 3x10          Lateral lunges NP NP 2x10          Step downs L3  3x10 L2  AE  3x15 L2  AE  3x15          Manuals:             PROM L LE KO D/C                                                                                                                                                          Modalities  5           15'   15' 15'                                                                        "

## 2024-05-06 ENCOUNTER — OFFICE VISIT (OUTPATIENT)
Dept: PHYSICAL THERAPY | Facility: MEDICAL CENTER | Age: 64
End: 2024-05-06
Payer: MEDICARE

## 2024-05-06 DIAGNOSIS — G89.18 ACUTE POST-OPERATIVE PAIN: ICD-10-CM

## 2024-05-06 DIAGNOSIS — M25.652 HIP STIFFNESS, LEFT: ICD-10-CM

## 2024-05-06 DIAGNOSIS — M25.662 KNEE STIFFNESS, LEFT: Primary | ICD-10-CM

## 2024-05-06 PROCEDURE — 97112 NEUROMUSCULAR REEDUCATION: CPT

## 2024-05-06 PROCEDURE — 97110 THERAPEUTIC EXERCISES: CPT

## 2024-05-06 NOTE — PROGRESS NOTES
"Daily Note     Today's date: 2024  Patient name: Nadia Mata  : 1960  MRN: [id number]  Referring provider: Althea Greene CRNP  Dx:   Encounter Diagnosis     ICD-10-CM    1. Knee stiffness, left  M25.662       2. Acute post-operative pain  G89.18       3. Hip stiffness, left  M25.652                      Subjective: Pt reports that her leg is making progress and was able to ref all day yesterday     Objective: See treatment diary below      Assessment: Tolerated treatment well. Patient demonstrated fatigue post treatment, exhibited good technique with therapeutic exercises, and would benefit from continued PT  Pt arrived late to aptmt, but able to accommodate.        Plan: Continue per plan of care.      Precautions: s/p R distal femur ORIF      Ther Ex          Bike 10' 10' 10' 10'         Table knee flexion 5\"  x30 D/C D/C          Heel slides 5\"  x30 5\"  x30 5\"  x30 5\"  x30         Prone knee/quad str 3x30\" 3x30\" 3x30\" 3x30\"         Prone knee flexion 3#  3x15 3#  3x15 3#  3x15 3#  3x15         QS 5\"  x30 5\"  x30 5\"  x30 5\"  x30         SLR flexion  3#  3x15 3#  3x15 3#  3x15 3#  3x15         HS and gastroc str 3x30\" 3x30\" 3x30\" 3x30\"         SLR add, abd, ext 3#  3x15  1#  Add  3x15 3#  3x15  1#  Add  3x15 3#  3x15  1#  Add  3x15 3#  3x15  2#  Add  3x15         LP 80#  3x10 100#  3x15 120#  3x10 120#  3x10         LAQ 5#  3x15 5#  3x15 5#  3x15 5#  3x15         Standing hip abd 3#  3x15 3#  3x15 3#  3x15 3#  3x15         Wall slides 3x10 3x10 3x10 3x10         Lateral lunges NP NP 2x10 2x10         Step downs L3  3x10 L2  AE  3x15 L2  AE  3x15 L2  AE  3x15         Fwd lunges    x10         Manuals:             PROM L LE KO D/C                                                                                                                                                          Modalities  5/6         MH 15'   15' 15' 15'                    "

## 2024-05-09 ENCOUNTER — OFFICE VISIT (OUTPATIENT)
Dept: PHYSICAL THERAPY | Facility: MEDICAL CENTER | Age: 64
End: 2024-05-09
Payer: MEDICARE

## 2024-05-09 DIAGNOSIS — M25.652 HIP STIFFNESS, LEFT: ICD-10-CM

## 2024-05-09 DIAGNOSIS — G89.18 ACUTE POST-OPERATIVE PAIN: ICD-10-CM

## 2024-05-09 DIAGNOSIS — M25.662 KNEE STIFFNESS, LEFT: Primary | ICD-10-CM

## 2024-05-09 PROCEDURE — 97110 THERAPEUTIC EXERCISES: CPT | Performed by: PHYSICAL THERAPIST

## 2024-05-09 PROCEDURE — 97112 NEUROMUSCULAR REEDUCATION: CPT | Performed by: PHYSICAL THERAPIST

## 2024-05-09 NOTE — PROGRESS NOTES
"Daily Note     Today's date: 2024  Patient name: Nadia Mata  : 1960  MRN: [id number]  Referring provider: Althea Greene CRNP  Dx:   Encounter Diagnosis     ICD-10-CM    1. Knee stiffness, left  M25.662       2. Acute post-operative pain  G89.18       3. Hip stiffness, left  M25.652                      Subjective: Pt reports continued progression.  No new c/o to report.        Objective: See treatment diary below      Assessment: Tolerated treatment well. Patient demonstrated fatigue post treatment, exhibited good technique with therapeutic exercises, and would benefit from continued PT      Plan: Continue per plan of care.      Precautions: s/p R distal femur ORIF      Ther Ex         Bike 10' 10' 10' 10' 10'        Table knee flexion 5\"  x30 D/C D/C          Heel slides 5\"  x30 5\"  x30 5\"  x30 5\"  x30 x30        Prone knee/quad str 3x30\" 3x30\" 3x30\" 3x30\" 3x30\"        Prone knee flexion 3#  3x15 3#  3x15 3#  3x15 3#  3x15 4#  3x10          QS 5\"  x30 5\"  x30 5\"  x30 5\"  x30 5\"  x30        SLR flexion  3#  3x15 3#  3x15 3#  3x15 3#  3x15 4#  3x10        HS and gastroc str 3x30\" 3x30\" 3x30\" 3x30\" 3x30\"        SLR add, abd, ext 3#  3x15  1#  Add  3x15 3#  3x15  1#  Add  3x15 3#  3x15  1#  Add  3x15 3#  3x15  2#  Add  3x15 4#  3x10  2#  Add          LP 80#  3x10 100#  3x15 120#  3x10 120#  3x10 120#  3x10        LAQ 5#  3x15 5#  3x15 5#  3x15 5#  3x15 7#  3x10        Standing hip abd 3#  3x15 3#  3x15 3#  3x15 3#  3x15 4#  3x10        Wall slides 3x10 3x10 3x10 3x10 3x15        Lateral lunges NP NP 2x10 2x10 x20        Step downs L3  3x10 L2  AE  3x15 L2  AE  3x15 L2  AE  3x15 L2  AE  3x15        Fwd lunges    x10 x20        Manuals:             PROM REX ANTHONY D/C                                                                                                                                                          Modalities          15'   15' 15' 15' 15'   "

## 2024-05-13 ENCOUNTER — OFFICE VISIT (OUTPATIENT)
Dept: PHYSICAL THERAPY | Facility: MEDICAL CENTER | Age: 64
End: 2024-05-13
Payer: MEDICARE

## 2024-05-13 DIAGNOSIS — M25.652 HIP STIFFNESS, LEFT: ICD-10-CM

## 2024-05-13 DIAGNOSIS — M25.662 KNEE STIFFNESS, LEFT: Primary | ICD-10-CM

## 2024-05-13 DIAGNOSIS — G89.18 ACUTE POST-OPERATIVE PAIN: ICD-10-CM

## 2024-05-13 PROCEDURE — 97110 THERAPEUTIC EXERCISES: CPT

## 2024-05-13 PROCEDURE — 97112 NEUROMUSCULAR REEDUCATION: CPT

## 2024-05-13 NOTE — PROGRESS NOTES
"Daily Note     Today's date: 2024  Patient name: Nadia Mata  : 1960  MRN: [id number]  Referring provider: Althea Greene CRNP  Dx:   Encounter Diagnosis     ICD-10-CM    1. Knee stiffness, left  M25.662       2. Acute post-operative pain  G89.18       3. Hip stiffness, left  M25.652                      Subjective: Pt reports that she's been trying to walk faster and with a more normalized gait pattern.       Objective: See treatment diary below      Assessment: Tolerated treatment well. Patient demonstrated fatigue post treatment, exhibited good technique with therapeutic exercises, and would benefit from continued PT  Pt's strength and endurance continue to improve.       Plan: Continue per plan of care.      Precautions: s/p R distal femur ORIF      Ther Ex        Bike 10' 10' 10' 10' 10' 10'       Table knee flexion 5\"  x30 D/C D/C          Heel slides 5\"  x30 5\"  x30 5\"  x30 5\"  x30 x30 x30       Prone knee/quad str 3x30\" 3x30\" 3x30\" 3x30\" 3x30\" 3x30\"       Prone knee flexion 3#  3x15 3#  3x15 3#  3x15 3#  3x15 4#  3x10   4#  3x10       QS 5\"  x30 5\"  x30 5\"  x30 5\"  x30 5\"  x30 5\"  x30       SLR flexion  3#  3x15 3#  3x15 3#  3x15 3#  3x15 4#  3x10 4#  3x10       HS and gastroc str 3x30\" 3x30\" 3x30\" 3x30\" 3x30\" 3x30\"       SLR add, abd, ext 3#  3x15  1#  Add  3x15 3#  3x15  1#  Add  3x15 3#  3x15  1#  Add  3x15 3#  3x15  2#  Add  3x15 4#  3x10  2#  Add   4#  3x10  2#  add       LP 80#  3x10 100#  3x15 120#  3x10 120#  3x10 120#  3x10 120#  3x10       LAQ 5#  3x15 5#  3x15 5#  3x15 5#  3x15 7#  3x10 7#  3x12       Standing hip abd 3#  3x15 3#  3x15 3#  3x15 3#  3x15 4#  3x10 4#  3x10       Wall slides 3x10 3x10 3x10 3x10 3x15 3x15       Lateral lunges NP NP 2x10 2x10 x20 3x15       Step downs L3  3x10 L2  AE  3x15 L2  AE  3x15 L2  AE  3x15 L2  AE  3x15 L2  AE  3x15       Fwd lunges    x10 x20 2x10       Manuals:             PROM L EUSEBIO CÁRDENAS D/C                           "                                                                                                                                Modalities 4/22 4/25 5/2 5/6 5/9 5/13        15'   15' 15' 15' 15' 15'

## 2024-05-15 ENCOUNTER — APPOINTMENT (OUTPATIENT)
Dept: LAB | Facility: CLINIC | Age: 64
End: 2024-05-15
Payer: MEDICARE

## 2024-05-15 DIAGNOSIS — S72.492D OTHER CLOSED FRACTURE OF DISTAL END OF LEFT FEMUR WITH ROUTINE HEALING, SUBSEQUENT ENCOUNTER: ICD-10-CM

## 2024-05-15 DIAGNOSIS — Z13.29 SCREENING FOR THYROID DISORDER: ICD-10-CM

## 2024-05-15 DIAGNOSIS — E55.9 VITAMIN D DEFICIENCY: ICD-10-CM

## 2024-05-15 DIAGNOSIS — Z13.6 SCREENING FOR CARDIOVASCULAR CONDITION: ICD-10-CM

## 2024-05-15 DIAGNOSIS — M85.859 OSTEOPENIA OF NECK OF FEMUR, UNSPECIFIED LATERALITY: ICD-10-CM

## 2024-05-15 DIAGNOSIS — M04.8 OTHER AUTOINFLAMMATORY SYNDROMES (HCC): ICD-10-CM

## 2024-05-15 DIAGNOSIS — M85.88 OSTEOPENIA OF OTHER SITE: ICD-10-CM

## 2024-05-15 DIAGNOSIS — Z00.00 MEDICARE ANNUAL WELLNESS VISIT, INITIAL: ICD-10-CM

## 2024-05-15 LAB
25(OH)D3 SERPL-MCNC: 19.6 NG/ML (ref 30–100)
ALBUMIN SERPL BCP-MCNC: 3.9 G/DL (ref 3.5–5)
ALP SERPL-CCNC: 72 U/L (ref 34–104)
ALT SERPL W P-5'-P-CCNC: 25 U/L (ref 7–52)
ANION GAP SERPL CALCULATED.3IONS-SCNC: 7 MMOL/L (ref 4–13)
AST SERPL W P-5'-P-CCNC: 26 U/L (ref 13–39)
BASOPHILS # BLD AUTO: 0.05 THOUSANDS/ÂΜL (ref 0–0.1)
BASOPHILS NFR BLD AUTO: 1 % (ref 0–1)
BILIRUB SERPL-MCNC: 0.71 MG/DL (ref 0.2–1)
BUN SERPL-MCNC: 15 MG/DL (ref 5–25)
CALCIUM SERPL-MCNC: 9 MG/DL (ref 8.4–10.2)
CHLORIDE SERPL-SCNC: 105 MMOL/L (ref 96–108)
CHOLEST SERPL-MCNC: 162 MG/DL
CO2 SERPL-SCNC: 29 MMOL/L (ref 21–32)
CREAT SERPL-MCNC: 0.87 MG/DL (ref 0.6–1.3)
EOSINOPHIL # BLD AUTO: 0.32 THOUSAND/ÂΜL (ref 0–0.61)
EOSINOPHIL NFR BLD AUTO: 7 % (ref 0–6)
ERYTHROCYTE [DISTWIDTH] IN BLOOD BY AUTOMATED COUNT: 17.2 % (ref 11.6–15.1)
GFR SERPL CREATININE-BSD FRML MDRD: 70 ML/MIN/1.73SQ M
GLUCOSE P FAST SERPL-MCNC: 83 MG/DL (ref 65–99)
HCT VFR BLD AUTO: 34 % (ref 34.8–46.1)
HDLC SERPL-MCNC: 72 MG/DL
HGB BLD-MCNC: 9.9 G/DL (ref 11.5–15.4)
IMM GRANULOCYTES # BLD AUTO: 0.01 THOUSAND/UL (ref 0–0.2)
IMM GRANULOCYTES NFR BLD AUTO: 0 % (ref 0–2)
LDLC SERPL CALC-MCNC: 77 MG/DL (ref 0–100)
LYMPHOCYTES # BLD AUTO: 1.89 THOUSANDS/ÂΜL (ref 0.6–4.47)
LYMPHOCYTES NFR BLD AUTO: 43 % (ref 14–44)
MCH RBC QN AUTO: 18.6 PG (ref 26.8–34.3)
MCHC RBC AUTO-ENTMCNC: 29.1 G/DL (ref 31.4–37.4)
MCV RBC AUTO: 64 FL (ref 82–98)
MONOCYTES # BLD AUTO: 0.35 THOUSAND/ÂΜL (ref 0.17–1.22)
MONOCYTES NFR BLD AUTO: 8 % (ref 4–12)
NEUTROPHILS # BLD AUTO: 1.85 THOUSANDS/ÂΜL (ref 1.85–7.62)
NEUTS SEG NFR BLD AUTO: 41 % (ref 43–75)
NONHDLC SERPL-MCNC: 90 MG/DL
NRBC BLD AUTO-RTO: 0 /100 WBCS
PLATELET # BLD AUTO: 243 THOUSANDS/UL (ref 149–390)
POTASSIUM SERPL-SCNC: 4.4 MMOL/L (ref 3.5–5.3)
PROT SERPL-MCNC: 6.8 G/DL (ref 6.4–8.4)
RBC # BLD AUTO: 5.33 MILLION/UL (ref 3.81–5.12)
SODIUM SERPL-SCNC: 141 MMOL/L (ref 135–147)
TRIGL SERPL-MCNC: 65 MG/DL
TSH SERPL DL<=0.05 MIU/L-ACNC: 2.02 UIU/ML (ref 0.45–4.5)
WBC # BLD AUTO: 4.47 THOUSAND/UL (ref 4.31–10.16)

## 2024-05-15 PROCEDURE — 80053 COMPREHEN METABOLIC PANEL: CPT

## 2024-05-15 PROCEDURE — 84443 ASSAY THYROID STIM HORMONE: CPT

## 2024-05-15 PROCEDURE — 36415 COLL VENOUS BLD VENIPUNCTURE: CPT

## 2024-05-15 PROCEDURE — 80061 LIPID PANEL: CPT

## 2024-05-15 PROCEDURE — 82306 VITAMIN D 25 HYDROXY: CPT

## 2024-05-15 PROCEDURE — 85025 COMPLETE CBC W/AUTO DIFF WBC: CPT

## 2024-05-16 ENCOUNTER — APPOINTMENT (OUTPATIENT)
Dept: PHYSICAL THERAPY | Facility: MEDICAL CENTER | Age: 64
End: 2024-05-16
Payer: MEDICARE

## 2024-05-20 ENCOUNTER — APPOINTMENT (OUTPATIENT)
Dept: PHYSICAL THERAPY | Facility: MEDICAL CENTER | Age: 64
End: 2024-05-20
Payer: MEDICARE

## 2024-05-22 ENCOUNTER — TELEPHONE (OUTPATIENT)
Dept: FAMILY MEDICINE CLINIC | Facility: CLINIC | Age: 64
End: 2024-05-22

## 2024-05-22 NOTE — TELEPHONE ENCOUNTER
Spoke to patient she can  her disc whenever she would like.  IT working with Elin to obtain images she requested be added to her chart.

## 2024-05-23 ENCOUNTER — OFFICE VISIT (OUTPATIENT)
Dept: PHYSICAL THERAPY | Facility: MEDICAL CENTER | Age: 64
End: 2024-05-23
Payer: MEDICARE

## 2024-05-23 DIAGNOSIS — G89.18 ACUTE POST-OPERATIVE PAIN: ICD-10-CM

## 2024-05-23 DIAGNOSIS — M25.662 KNEE STIFFNESS, LEFT: Primary | ICD-10-CM

## 2024-05-23 DIAGNOSIS — M25.652 HIP STIFFNESS, LEFT: ICD-10-CM

## 2024-05-23 PROCEDURE — 97110 THERAPEUTIC EXERCISES: CPT

## 2024-05-23 PROCEDURE — 97112 NEUROMUSCULAR REEDUCATION: CPT

## 2024-05-23 NOTE — PROGRESS NOTES
"Daily Note     Today's date: 2024  Patient name: Nadia Mata  : 1960  MRN: [id number]  Referring provider: Althea Greene CRNP  Dx:   Encounter Diagnosis     ICD-10-CM    1. Knee stiffness, left  M25.662       2. Acute post-operative pain  G89.18       3. Hip stiffness, left  M25.652                      Subjective: Pt reports that she is doing okay today continuing to try and walk faster. Pt reports that she got a mole removed on her L leg 6 days ago with the dermatologist stating that she should not exercise for 14 days.       Objective: See treatment diary below      Assessment: Tolerated treatment well. Despite demonologists recommendations, pt still presented to clinic and gave verbal consent to perform exercises. Performed all exercises to tolerance today to avoid any pulling on the stitches as well as not using ankle weights over that area. Skipped the heat this session. Patient demonstrated fatigue post treatment, exhibited good technique with therapeutic exercises, and would benefit from continued PT  Pt's strength and endurance continue to improve.       Plan: Continue per plan of care.      Precautions: s/p R distal femur ORIF      Ther Ex  5      Bike 10' 10' 10' 10' 10' 10' 10'      Table knee flexion 5\"  x30 D/C D/C          Heel slides 5\"  x30 5\"  x30 5\"  x30 5\"  x30 x30 x30 x30      Prone knee/quad str 3x30\" 3x30\" 3x30\" 3x30\" 3x30\" 3x30\" nv      Prone knee flexion 3#  3x15 3#  3x15 3#  3x15 3#  3x15 4#  3x10   4#  3x10 3x10      QS 5\"  x30 5\"  x30 5\"  x30 5\"  x30 5\"  x30 5\"  x30 5\"x30      SLR flexion  3#  3x15 3#  3x15 3#  3x15 3#  3x15 4#  3x10 4#  3x10 3x10      HS and gastroc str 3x30\" 3x30\" 3x30\" 3x30\" 3x30\" 3x30\" 3x30\"      SLR add, abd, ext 3#  3x15  1#  Add  3x15 3#  3x15  1#  Add  3x15 3#  3x15  1#  Add  3x15 3#  3x15  2#  Add  3x15 4#  3x10  2#  Add   4#  3x10  2#  add 3x10      LP 80#  3x10 100#  3x15 120#  3x10 120#  3x10 120#  3x10 120#  3x10 " nv      LAQ 5#  3x15 5#  3x15 5#  3x15 5#  3x15 7#  3x10 7#  3x12 5# shoe 3x10      Standing hip abd 3#  3x15 3#  3x15 3#  3x15 3#  3x15 4#  3x10 4#  3x10 3x10      Wall slides 3x10 3x10 3x10 3x10 3x15 3x15 3x15      Lateral lunges NP NP 2x10 2x10 x20 3x15 nv      Step downs L3  3x10 L2  AE  3x15 L2  AE  3x15 L2  AE  3x15 L2  AE  3x15 L2  AE  3x15 nv      Fwd lunges    x10 x20 2x10 nv      Manuals: 4/22            PROM L EUSEBIO CÁRDENAS D/C                                                                                                                                                          Modalities 4/22 4/25 5/2 5/6 5/9 5/13 5/23       15'   15' 15' 15' 15' 15' nv

## 2024-05-28 ENCOUNTER — APPOINTMENT (OUTPATIENT)
Dept: PHYSICAL THERAPY | Facility: MEDICAL CENTER | Age: 64
End: 2024-05-28
Payer: MEDICARE

## 2024-05-30 ENCOUNTER — OFFICE VISIT (OUTPATIENT)
Dept: PHYSICAL THERAPY | Facility: MEDICAL CENTER | Age: 64
End: 2024-05-30
Payer: MEDICARE

## 2024-05-30 DIAGNOSIS — M25.652 HIP STIFFNESS, LEFT: ICD-10-CM

## 2024-05-30 DIAGNOSIS — M25.662 KNEE STIFFNESS, LEFT: Primary | ICD-10-CM

## 2024-05-30 DIAGNOSIS — G89.18 ACUTE POST-OPERATIVE PAIN: ICD-10-CM

## 2024-05-30 PROCEDURE — 97112 NEUROMUSCULAR REEDUCATION: CPT

## 2024-05-30 PROCEDURE — 97110 THERAPEUTIC EXERCISES: CPT

## 2024-05-30 NOTE — PROGRESS NOTES
"Daily Note     Today's date: 2024  Patient name: Nadai Mata  : 1960  MRN: [id number]  Referring provider: Althea Greene CRNP  Dx:   Encounter Diagnosis     ICD-10-CM    1. Knee stiffness, left  M25.662       2. Acute post-operative pain  G89.18       3. Hip stiffness, left  M25.652                      Subjective: Pt reports that she is having some discomfort in her L knee today when she bends it describing it as \"clicking\"       Objective: See treatment diary below      Assessment: Tolerated treatment well. Pt was able to perform more exercises this session all to tolerance. Patient demonstrated fatigue post treatment, exhibited good technique with therapeutic exercises, and would benefit from continued PT  PT, HK assessed her L knee and educated pt to contact her MD if she feels it is necessary to get further imaging although no real finding present when assessing, compliance present.  Pt's strength and endurance continue to improve. Heat to end to L knee.       Plan: Continue per plan of care.      Precautions: s/p R distal femur ORIF      Ther Ex      Bike 10' 10' 10' 10' 10' 10' 10' 10'     Table knee flexion 5\"  x30 D/C D/C          Heel slides 5\"  x30 5\"  x30 5\"  x30 5\"  x30 x30 x30 x30 x30     Prone knee/quad str 3x30\" 3x30\" 3x30\" 3x30\" 3x30\" 3x30\" nv 3x30\"     Prone knee flexion 3#  3x15 3#  3x15 3#  3x15 3#  3x15 4#  3x10   4#  3x10 3x10 4# 3x10     QS 5\"  x30 5\"  x30 5\"  x30 5\"  x30 5\"  x30 5\"  x30 5\"x30 5\"x30     SLR flexion  3#  3x15 3#  3x15 3#  3x15 3#  3x15 4#  3x10 4#  3x10 3x10 4# 3x10     HS and gastroc str 3x30\" 3x30\" 3x30\" 3x30\" 3x30\" 3x30\" 3x30\" 3x30\"     SLR add, abd, ext 3#  3x15  1#  Add  3x15 3#  3x15  1#  Add  3x15 3#  3x15  1#  Add  3x15 3#  3x15  2#  Add  3x15 4#  3x10  2#  Add   4#  3x10  2#  add 3x10 4# 3x10     LP 80#  3x10 100#  3x15 120#  3x10 120#  3x10 120#  3x10 120#  3x10 nv 100# 3x10     LAQ 5#  3x15 5#  3x15 5#  3x15 " 5#  3x15 7#  3x10 7#  3x12 5# shoe 3x10 4# shoe 3x10     Standing hip abd 3#  3x15 3#  3x15 3#  3x15 3#  3x15 4#  3x10 4#  3x10 3x10 4# 3x10     Wall slides 3x10 3x10 3x10 3x10 3x15 3x15 3x15 3x15     Lateral lunges NP NP 2x10 2x10 x20 3x15 nv 3x10     Step downs L3  3x10 L2  AE  3x15 L2  AE  3x15 L2  AE  3x15 L2  AE  3x15 L2  AE  3x15 nv L2 AE 3x10     Fwd lunges    x10 x20 2x10 nv 3x10     Manuals: 4/22            PROM L LE KO D/C                                                                                                                                                          Modalities 4/22 4/25 5/2 5/6 5/9 5/13 5/23 5/30      15'   15' 15' 15' 15' 15' nv 15'

## 2024-06-03 ENCOUNTER — OFFICE VISIT (OUTPATIENT)
Dept: PHYSICAL THERAPY | Facility: MEDICAL CENTER | Age: 64
End: 2024-06-03
Payer: MEDICARE

## 2024-06-03 DIAGNOSIS — G89.18 ACUTE POST-OPERATIVE PAIN: ICD-10-CM

## 2024-06-03 DIAGNOSIS — M25.652 HIP STIFFNESS, LEFT: ICD-10-CM

## 2024-06-03 DIAGNOSIS — M25.662 KNEE STIFFNESS, LEFT: Primary | ICD-10-CM

## 2024-06-03 NOTE — PROGRESS NOTES
"Daily Note     Today's date: 6/3/2024  Patient name: Nadia Mata  : 1960  MRN: [id number]  Referring provider: Althea Greene CRNP  Dx:   Encounter Diagnosis     ICD-10-CM    1. Knee stiffness, left  M25.662       2. Acute post-operative pain  G89.18       3. Hip stiffness, left  M25.652                      Subjective: ***      Objective: See treatment diary below      Assessment: Tolerated treatment {Tolerated treatment :[id number]}. Patient {assessment:6469124795}      Plan: {PLAN:[id number]}     Precautions: s/p L distal femur ORIF      Ther Ex 4/22 4/25 4/29 5/6 5/9 5/13 5/23 5/30 6/3    Bike 10' 10' 10' 10' 10' 10' 10' 10' 10'    Table knee flexion 5\"  x30 D/C D/C          Heel slides 5\"  x30 5\"  x30 5\"  x30 5\"  x30 x30 x30 x30 x30 x30    Prone knee/quad str 3x30\" 3x30\" 3x30\" 3x30\" 3x30\" 3x30\" nv 3x30\" 3x30\"    Prone knee flexion 3#  3x15 3#  3x15 3#  3x15 3#  3x15 4#  3x10   4#  3x10 3x10 4# 3x10 3#  shoe    QS 5\"  x30 5\"  x30 5\"  x30 5\"  x30 5\"  x30 5\"  x30 5\"x30 5\"x30 5\"  x30    SLR flexion  3#  3x15 3#  3x15 3#  3x15 3#  3x15 4#  3x10 4#  3x10 3x10 4# 3x10 3#  Shoe  3x10    HS and gastroc str 3x30\" 3x30\" 3x30\" 3x30\" 3x30\" 3x30\" 3x30\" 3x30\" 3x30\"    SLR add, abd, ext 3#  3x15  1#  Add  3x15 3#  3x15  1#  Add  3x15 3#  3x15  1#  Add  3x15 3#  3x15  2#  Add  3x15 4#  3x10  2#  Add   4#  3x10  2#  add 3x10 4# 3x10 3#  Shoe  3x10    LP 80#  3x10 100#  3x15 120#  3x10 120#  3x10 120#  3x10 120#  3x10 nv 100# 3x10 100#  3x10    LAQ 5#  3x15 5#  3x15 5#  3x15 5#  3x15 7#  3x10 7#  3x12 5# shoe 3x10 4# shoe 3x10 3#  Shoe  x30    Standing hip abd 3#  3x15 3#  3x15 3#  3x15 3#  3x15 4#  3x10 4#  3x10 3x10 4# 3x10 3#  Shoe  3x10    Wall slides 3x10 3x10 3x10 3x10 3x15 3x15 3x15 3x15 3x10    Lateral lunges NP NP 2x10 2x10 x20 3x15 nv 3x10 3x10    Step downs L3  3x10 L2  AE  3x15 L2  AE  3x15 L2  AE  3x15 L2  AE  3x15 L2  AE  3x15 nv L2 AE 3x10 L2  AE  3x10    Fwd lunges    x10 x20 2x10 nv 3x10     Manuals: " 4/22            PROM REX ANTHONY D/C                                                                                                                                                          Modalities 4/22 4/25 5/2 5/6 5/9 5/13 5/23 5/30 6/3    MH 15'   15' 15' 15' 15' 15' nv 15' 15'

## 2024-06-06 ENCOUNTER — OFFICE VISIT (OUTPATIENT)
Dept: PHYSICAL THERAPY | Facility: MEDICAL CENTER | Age: 64
End: 2024-06-06
Payer: MEDICARE

## 2024-06-06 DIAGNOSIS — M25.652 HIP STIFFNESS, LEFT: ICD-10-CM

## 2024-06-06 DIAGNOSIS — M25.662 KNEE STIFFNESS, LEFT: Primary | ICD-10-CM

## 2024-06-06 DIAGNOSIS — G89.18 ACUTE POST-OPERATIVE PAIN: ICD-10-CM

## 2024-06-06 PROCEDURE — 97112 NEUROMUSCULAR REEDUCATION: CPT | Performed by: PHYSICAL THERAPY ASSISTANT

## 2024-06-06 PROCEDURE — 97110 THERAPEUTIC EXERCISES: CPT | Performed by: PHYSICAL THERAPY ASSISTANT

## 2024-06-06 NOTE — PROGRESS NOTES
"Daily Note     Today's date: 2024  Patient name: Nadia Mata  : 1960  MRN: [id number]  Referring provider: Althea Greene CRNP  Dx:   Encounter Diagnosis     ICD-10-CM    1. Knee stiffness, left  M25.662       2. Acute post-operative pain  G89.18       3. Hip stiffness, left  M25.652                      Subjective: No new complaints.       Objective: See treatment diary below      Assessment: Tolerated treatment well. Patient demonstrated fatigue post treatment, exhibited good technique with therapeutic exercises, and would benefit from continued PT      Plan: Continue per plan of care.  Progress treatment as tolerated.       Precautions: s/p L distal femur ORIF      Ther Ex 4/22 4/25 4/29 5/6 5/9 5/13 5/23 5/30 6/3 6/6   Bike 10' 10' 10' 10' 10' 10' 10' 10' 10' 10'   Table knee flexion 5\"  x30 D/C D/C          Heel slides 5\"  x30 5\"  x30 5\"  x30 5\"  x30 x30 x30 x30 x30 x30 x30   Prone knee/quad str 3x30\" 3x30\" 3x30\" 3x30\" 3x30\" 3x30\" nv 3x30\" 3x30\" 3x30\"   Prone knee flexion 3#  3x15 3#  3x15 3#  3x15 3#  3x15 4#  3x10   4#  3x10 3x10 4# 3x10 3#  shoe 3# shoe 3x10   QS 5\"  x30 5\"  x30 5\"  x30 5\"  x30 5\"  x30 5\"  x30 5\"x30 5\"x30 5\"  x30 5\"x30   SLR flexion  3#  3x15 3#  3x15 3#  3x15 3#  3x15 4#  3x10 4#  3x10 3x10 4# 3x10 3#  Shoe  3x10 3# shoe 3x10   HS and gastroc str 3x30\" 3x30\" 3x30\" 3x30\" 3x30\" 3x30\" 3x30\" 3x30\" 3x30\" 3x30\"   SLR add, abd, ext 3#  3x15  1#  Add  3x15 3#  3x15  1#  Add  3x15 3#  3x15  1#  Add  3x15 3#  3x15  2#  Add  3x15 4#  3x10  2#  Add   4#  3x10  2#  add 3x10 4# 3x10 3#  Shoe  3x10 3# shoe 3x10   LP 80#  3x10 100#  3x15 120#  3x10 120#  3x10 120#  3x10 120#  3x10 nv 100# 3x10 100#  3x10 110# 3x10   LAQ 5#  3x15 5#  3x15 5#  3x15 5#  3x15 7#  3x10 7#  3x12 5# shoe 3x10 4# shoe 3x10 3#  Shoe  x30 3# shoe 3x10   Standing hip abd 3#  3x15 3#  3x15 3#  3x15 3#  3x15 4#  3x10 4#  3x10 3x10 4# 3x10 3#  Shoe  3x10 3# shoe 3x10   Wall slides 3x10 3x10 3x10 3x10 3x15 3x15 3x15 3x15 " 3x10 3x15   Lateral lunges NP NP 2x10 2x10 x20 3x15 nv 3x10 3x10    Step downs L3  3x10 L2  AE  3x15 L2  AE  3x15 L2  AE  3x15 L2  AE  3x15 L2  AE  3x15 nv L2 AE 3x10 L2  AE  3x10 L2 AE 3x10   Fwd lunges    x10 x20 2x10 nv 3x10     Manuals: 4/22            PROM L EUSEBIO CÁRDENAS D/C                                                                                                                                                          Modalities 4/22 4/25 5/2 5/6 5/9 5/13 5/23 5/30 6/3 6/6   MH 15'   15' 15' 15' 15' 15' nv 15' 15'

## 2024-06-10 ENCOUNTER — OFFICE VISIT (OUTPATIENT)
Dept: PHYSICAL THERAPY | Facility: MEDICAL CENTER | Age: 64
End: 2024-06-10
Payer: MEDICARE

## 2024-06-10 DIAGNOSIS — G89.18 ACUTE POST-OPERATIVE PAIN: ICD-10-CM

## 2024-06-10 DIAGNOSIS — M25.652 HIP STIFFNESS, LEFT: ICD-10-CM

## 2024-06-10 DIAGNOSIS — M25.662 KNEE STIFFNESS, LEFT: Primary | ICD-10-CM

## 2024-06-10 PROCEDURE — 97112 NEUROMUSCULAR REEDUCATION: CPT | Performed by: PHYSICAL THERAPIST

## 2024-06-10 PROCEDURE — 97110 THERAPEUTIC EXERCISES: CPT | Performed by: PHYSICAL THERAPIST

## 2024-06-10 NOTE — PROGRESS NOTES
"Daily Note     Today's date: 6/10/2024  Patient name: Nadia Mata  : 1960  MRN: [id number]  Referring provider: Althea Greene CRNP  Dx:   Encounter Diagnosis     ICD-10-CM    1. Knee stiffness, left  M25.662       2. Acute post-operative pain  G89.18       3. Hip stiffness, left  M25.652                      Subjective: Pt reports that the two week she had off due to her procedure impacted her strength.        Objective: See treatment diary below      Assessment: Tolerated treatment well. Patient demonstrated fatigue post treatment, exhibited good technique with therapeutic exercises, and would benefit from continued PT      Plan: Continue per plan of care.      Precautions: s/p L distal femur ORIF      Ther Ex 4/22 4/25 4/29 5/6 5/9 5/13 5/23 5/30 6/3 6/6 6/10   Bike 10' 10' 10' 10' 10' 10' 10' 10' 10' 10' 10'   Table knee flexion 5\"  x30 D/C D/C           Heel slides 5\"  x30 5\"  x30 5\"  x30 5\"  x30 x30 x30 x30 x30 x30 x30 x30   Prone knee/quad str 3x30\" 3x30\" 3x30\" 3x30\" 3x30\" 3x30\" nv 3x30\" 3x30\" 3x30\" 3x30\"   Prone knee flexion 3#  3x15 3#  3x15 3#  3x15 3#  3x15 4#  3x10   4#  3x10 3x10 4# 3x10 3#  shoe 3# shoe 3x10 4#  Shoe  3x10   QS 5\"  x30 5\"  x30 5\"  x30 5\"  x30 5\"  x30 5\"  x30 5\"x30 5\"x30 5\"  x30 5\"x30 5\"  x30   SLR flexion  3#  3x15 3#  3x15 3#  3x15 3#  3x15 4#  3x10 4#  3x10 3x10 4# 3x10 3#  Shoe  3x10 3# shoe 3x10 4#  Shoe  3x10   HS and gastroc str 3x30\" 3x30\" 3x30\" 3x30\" 3x30\" 3x30\" 3x30\" 3x30\" 3x30\" 3x30\" 3x30\"   SLR add, abd, ext 3#  3x15  1#  Add  3x15 3#  3x15  1#  Add  3x15 3#  3x15  1#  Add  3x15 3#  3x15  2#  Add  3x15 4#  3x10  2#  Add   4#  3x10  2#  add 3x10 4# 3x10 3#  Shoe  3x10 3# shoe 3x10 4#  Shoe  3x10   LP 80#  3x10 100#  3x15 120#  3x10 120#  3x10 120#  3x10 120#  3x10 nv 100# 3x10 100#  3x10 110# 3x10 110#  3x10   LAQ 5#  3x15 5#  3x15 5#  3x15 5#  3x15 7#  3x10 7#  3x12 5# shoe 3x10 4# shoe 3x10 3#  Shoe  x30 3# shoe 3x10 4#  3x10  shoe   Standing hip abd 3#  3x15 " 3#  3x15 3#  3x15 3#  3x15 4#  3x10 4#  3x10 3x10 4# 3x10 3#  Shoe  3x10 3# shoe 3x10 4#  3x10  Shoe    Wall slides 3x10 3x10 3x10 3x10 3x15 3x15 3x15 3x15 3x10 3x15 3x15   Lateral lunges NP NP 2x10 2x10 x20 3x15 nv 3x10 3x10 3x10 5#  DB  3x10   Step downs L3  3x10 L2  AE  3x15 L2  AE  3x15 L2  AE  3x15 L2  AE  3x15 L2  AE  3x15 nv L2 AE 3x10 L2  AE  3x10 L2 AE 3x10 L2  AE  3x10   Fwd lunges    x10 x20 2x10 nv 3x10   3x10   Manuals: 4/22             PROM L EUSEBIO CÁRDENAS D/C                                                                                                                                                                      Modalities 4/22 4/25 5/2 5/6 5/9 5/13 5/23 5/30 6/3 6/6     15'   15' 15' 15' 15' 15' nv 15' 15'

## 2024-06-17 ENCOUNTER — APPOINTMENT (OUTPATIENT)
Dept: PHYSICAL THERAPY | Facility: MEDICAL CENTER | Age: 64
End: 2024-06-17
Payer: MEDICARE

## 2024-06-20 ENCOUNTER — OFFICE VISIT (OUTPATIENT)
Dept: PHYSICAL THERAPY | Facility: MEDICAL CENTER | Age: 64
End: 2024-06-20
Payer: MEDICARE

## 2024-06-20 DIAGNOSIS — M25.652 HIP STIFFNESS, LEFT: ICD-10-CM

## 2024-06-20 DIAGNOSIS — M25.662 KNEE STIFFNESS, LEFT: Primary | ICD-10-CM

## 2024-06-20 DIAGNOSIS — G89.18 ACUTE POST-OPERATIVE PAIN: ICD-10-CM

## 2024-06-20 PROCEDURE — 97112 NEUROMUSCULAR REEDUCATION: CPT

## 2024-06-20 PROCEDURE — 97110 THERAPEUTIC EXERCISES: CPT

## 2024-06-20 NOTE — PROGRESS NOTES
"Daily Note     Today's date: 2024  Patient name: Nadia Mata  : 1960  MRN: [id number]  Referring provider: Althea Greene CRNP  Dx:   Encounter Diagnosis     ICD-10-CM    1. Knee stiffness, left  M25.662       2. Acute post-operative pain  G89.18       3. Hip stiffness, left  M25.652                      Subjective: Pt reports that she is still recovering from her setback from a few weeks ago.       Objective: See treatment diary below      Assessment: Tolerated treatment well. Patient arrived 20 min late, but was able to accommodate.  Pt demonstrated improved strength with ex's.  Pt was atigue post treatment, exhibited good technique with therapeutic exercises, and would benefit from continued PT      Plan: Continue per plan of care.      Precautions: s/p L distal femur ORIF      Ther Ex              Bike 10'                           Heel slides 5\"  x30             Prone knee/quad str 3x30\"             Prone knee flexion 4#  Shoe  3x10             QS 5\"  x30             SLR flexion  4#  Shoe  3x10             HS and gastroc str 3x30\"             SLR add, abd, ext 4#  Shoe  3x10             #  3x10             LAQ 4#  3x10  shoe             Standing hip abd 4#  3x10  shoe             Wall slides W/  10#  DB  3x10             Lateral lunges W/5#  DB  3x10             Step downs L2  AE  3x10             Fwd lunges W/5#  DB  3x10             Manuals:                                                                                                                                                                                      Modalities              MH                                                               "

## 2024-06-24 ENCOUNTER — OFFICE VISIT (OUTPATIENT)
Dept: PHYSICAL THERAPY | Facility: MEDICAL CENTER | Age: 64
End: 2024-06-24
Payer: MEDICARE

## 2024-06-24 DIAGNOSIS — M25.662 KNEE STIFFNESS, LEFT: Primary | ICD-10-CM

## 2024-06-24 DIAGNOSIS — G89.18 ACUTE POST-OPERATIVE PAIN: ICD-10-CM

## 2024-06-24 DIAGNOSIS — M25.652 HIP STIFFNESS, LEFT: ICD-10-CM

## 2024-06-24 PROCEDURE — 97112 NEUROMUSCULAR REEDUCATION: CPT | Performed by: PHYSICAL THERAPIST

## 2024-06-24 PROCEDURE — 97110 THERAPEUTIC EXERCISES: CPT | Performed by: PHYSICAL THERAPIST

## 2024-06-24 NOTE — PROGRESS NOTES
"Daily Note     Today's date: 2024  Patient name: Nadia Mata  : 1960  MRN: [id number]  Referring provider: Althea Greene CRNP  Dx:   Encounter Diagnosis     ICD-10-CM    1. Knee stiffness, left  M25.662       2. Acute post-operative pain  G89.18       3. Hip stiffness, left  M25.652                      Subjective: Pt reports continued stiffness in her knee, but no regular pain.        Objective: See treatment diary below      Assessment: Tolerated treatment well. Patient demonstrated fatigue post treatment, exhibited good technique with therapeutic exercises, and would benefit from continued PT.  Strength continues to gradually improve.       Plan: Continue per plan of care.      Precautions: s/p L distal femur ORIF      Ther Ex             Bike 10' 10'                          Heel slides 5\"  x30 x30            Prone knee/quad str 3x30\" 3x30\"            Prone knee flexion 4#  Shoe  3x10 5#  3x10            QS 5\"  x30 5\"  x30            SLR flexion  4#  Shoe  3x10 5#  3x10            HS and gastroc str 3x30\" 3x30\"            SLR add, abd, ext 4#  Shoe  3x10 5#  3x10            #  3x10 110#  3x10            LAQ 4#  3x10  shoe 5#  3x10            Standing hip abd 4#  3x10  shoe 5#  3x10            Wall slides W/  10#  DB  3x10 W/  2 10#  DB  3x10            Lateral lunges W/5#  DB  3x10 W/5#  DB  3x10            Step downs L2  AE  3x10 L2  AE  3x10            Fwd lunges W/5#  DB  3x10 W/5#  DB  3x10                                                                                                                                                                                                                                                                                 " No

## 2024-06-27 ENCOUNTER — OFFICE VISIT (OUTPATIENT)
Dept: PHYSICAL THERAPY | Facility: MEDICAL CENTER | Age: 64
End: 2024-06-27
Payer: MEDICARE

## 2024-06-27 DIAGNOSIS — G89.18 ACUTE POST-OPERATIVE PAIN: ICD-10-CM

## 2024-06-27 DIAGNOSIS — M25.652 HIP STIFFNESS, LEFT: ICD-10-CM

## 2024-06-27 DIAGNOSIS — M25.662 KNEE STIFFNESS, LEFT: Primary | ICD-10-CM

## 2024-06-27 PROCEDURE — 97112 NEUROMUSCULAR REEDUCATION: CPT

## 2024-06-27 PROCEDURE — 97110 THERAPEUTIC EXERCISES: CPT

## 2024-06-27 NOTE — PROGRESS NOTES
"Daily Note     Today's date: 2024  Patient name: Nadia Mata  : 1960  MRN: [id number]  Referring provider: Althea Greene CRNP  Dx:   Encounter Diagnosis     ICD-10-CM    1. Knee stiffness, left  M25.662       2. Acute post-operative pain  G89.18       3. Hip stiffness, left  M25.652                      Subjective: Pt reports that she is experiencing increased stiffness in her L knee after her procedure a few weeks ago.       Objective: See treatment diary below      Assessment: Tolerated treatment well. Patient demonstrated fatigue post treatment, exhibited good technique with therapeutic exercises, and would benefit from continued PT  Pt is making steady progress and demonstrates improved quad strength.       Plan: Continue per plan of care.      Precautions: s/p L distal femur ORIF      Ther Ex            Bike 10' 10' 10'                         Heel slides 5\"  x30 x30 x30           Prone knee/quad str 3x30\" 3x30\" 3x30\"           Prone knee flexion 4#  Shoe  3x10 5#  3x10 5#  3x10           QS 5\"  x30 5\"  x30 5\"  x30           SLR flexion  4#  Shoe  3x10 5#  3x10 5#  3x10           HS and gastroc str 3x30\" 3x30\" 3x30\"           SLR add, abd, ext 4#  Shoe  3x10 5#  3x10 5#  3x10           #  3x10 110#  3x10 120#  3x10           LAQ 4#  3x10  shoe 5#  3x10 5#  3x10           Standing hip abd 4#  3x10  shoe 5#  3x10 5#  3x15           Wall slides W/  10#  DB  3x10 W/  2 10#  DB  3x10 W/  10#  DB  3x10           Lateral lunges W/5#  DB  3x10 W/5#  DB  3x10 W/5#  DB  3x15           Step downs L2  AE  3x10 L2  AE  3x10 L2  AE  3x10           Fwd lunges W/5#  DB  3x10 W/5#  DB  3x10 W/5#  DB  3x15                                                                                                                                                                                                                                                                                  "

## 2024-07-01 ENCOUNTER — OFFICE VISIT (OUTPATIENT)
Dept: PHYSICAL THERAPY | Facility: MEDICAL CENTER | Age: 64
End: 2024-07-01
Payer: MEDICARE

## 2024-07-01 DIAGNOSIS — G89.18 ACUTE POST-OPERATIVE PAIN: ICD-10-CM

## 2024-07-01 DIAGNOSIS — M25.662 KNEE STIFFNESS, LEFT: Primary | ICD-10-CM

## 2024-07-01 DIAGNOSIS — M25.652 HIP STIFFNESS, LEFT: ICD-10-CM

## 2024-07-01 PROCEDURE — 97110 THERAPEUTIC EXERCISES: CPT | Performed by: PHYSICAL THERAPIST

## 2024-07-01 PROCEDURE — 97112 NEUROMUSCULAR REEDUCATION: CPT | Performed by: PHYSICAL THERAPIST

## 2024-07-01 NOTE — PROGRESS NOTES
"Daily Note     Today's date: 2024  Patient name: Nadia Mata  : 1960  MRN: [id number]  Referring provider: Althea Greene CRNP  Dx:   Encounter Diagnosis     ICD-10-CM    1. Knee stiffness, left  M25.662       2. Hip stiffness, left  M25.652       3. Acute post-operative pain  G89.18                      Subjective: Pts main c/o is knee stiffness and occasional clicking, she is not having regular pain.        Objective: See treatment diary below      Assessment: Tolerated treatment well. Patient demonstrated fatigue post treatment, exhibited good technique with therapeutic exercises, and would benefit from continued PT      Plan: Continue per plan of care.      Precautions: s/p L distal femur ORIF      Ther Ex           Bike 10' 10' 10' 10'                        Heel slides 5\"  x30 x30 x30 x30          Prone knee/quad str 3x30\" 3x30\" 3x30\" 3x30\"          Prone knee flexion 4#  Shoe  3x10 5#  3x10 5#  3x10 5#  3x12          QS 5\"  x30 5\"  x30 5\"  x30 5\"  x30          SLR flexion  4#  Shoe  3x10 5#  3x10 5#  3x10 5#  3x12          HS and gastroc str 3x30\" 3x30\" 3x30\" 3x30\"          SLR add, abd, ext 4#  Shoe  3x10 5#  3x10 5#  3x10 5#  3x12          #  3x10 110#  3x10 120#  3x10 125#  3x10          LAQ 4#  3x10  shoe 5#  3x10 5#  3x10 5#  3x12          Standing hip abd 4#  3x10  shoe 5#  3x10 5#  3x15 5#  3x15          Wall slides W/  10#  DB  3x10 W/  2 10#  DB  3x10 W/  10#  DB  3x10 10#  Dbs  3x10          Lateral lunges W/5#  DB  3x10 W/5#  DB  3x10 W/5#  DB  3x15 5#  Dbs  3x15          Step downs L2  AE  3x10 L2  AE  3x10 L2  AE  3x10 L2  AE          Fwd lunges W/5#  DB  3x10 W/5#  DB  3x10 W/5#  DB  3x15 5#  DBs  3x15                                                                                                                                                                                                                                        "

## 2024-07-05 ENCOUNTER — OFFICE VISIT (OUTPATIENT)
Dept: PHYSICAL THERAPY | Facility: MEDICAL CENTER | Age: 64
End: 2024-07-05
Payer: MEDICARE

## 2024-07-05 DIAGNOSIS — G89.18 ACUTE POST-OPERATIVE PAIN: ICD-10-CM

## 2024-07-05 DIAGNOSIS — M25.652 HIP STIFFNESS, LEFT: ICD-10-CM

## 2024-07-05 DIAGNOSIS — M25.662 KNEE STIFFNESS, LEFT: Primary | ICD-10-CM

## 2024-07-05 PROCEDURE — 97110 THERAPEUTIC EXERCISES: CPT

## 2024-07-05 PROCEDURE — 97112 NEUROMUSCULAR REEDUCATION: CPT

## 2024-07-05 NOTE — PROGRESS NOTES
"Daily Note     Today's date: 2024  Patient name: Nadia Mata  : 1960  MRN: [id number]  Referring provider: Althea Greene CRNP  Dx:   Encounter Diagnosis     ICD-10-CM    1. Knee stiffness, left  M25.662       2. Hip stiffness, left  M25.652       3. Acute post-operative pain  G89.18                      Subjective: Pt denies pain, but just struggles with stiffness in her L LE.       Objective: See treatment diary below      Assessment: Tolerated treatment well. Patient demonstrated fatigue post treatment, exhibited good technique with therapeutic exercises, and would benefit from continued PT      Plan: Continue per plan of care.      Precautions: s/p L distal femur ORIF      Ther Ex          Bike 10' 10' 10' 10' 10'                       Heel slides 5\"  x30 x30 x30 x30 x30         Prone knee/quad str 3x30\" 3x30\" 3x30\" 3x30\" 3x30\"         Prone knee flexion 4#  Shoe  3x10 5#  3x10 5#  3x10 5#  3x12 5#  3x15         QS 5\"  x30 5\"  x30 5\"  x30 5\"  x30 5\"  x30         SLR flexion  4#  Shoe  3x10 5#  3x10 5#  3x10 5#  3x12 5#  3x15         HS and gastroc str 3x30\" 3x30\" 3x30\" 3x30\" 3x30\"         SLR add, abd, ext 4#  Shoe  3x10 5#  3x10 5#  3x10 5#  3x12 5#  3x15         #  3x10 110#  3x10 120#  3x10 125#  3x10 125#  3x10         LAQ 4#  3x10  shoe 5#  3x10 5#  3x10 5#  3x12 5#  3x15         Standing hip abd 4#  3x10  shoe 5#  3x10 5#  3x15 5#  3x15 5#  3x15         Wall slides W/  10#  DB  3x10 W/  2 10#  DB  3x10 W/  10#  DB  3x10 10#  Dbs  3x10 10#  DB  3x15         Lateral lunges W/5#  DB  3x10 W/5#  DB  3x10 W/5#  DB  3x15 5#  Dbs  3x15 5#  DB  3x15         Step downs L2  AE  3x10 L2  AE  3x10 L2  AE  3x10 L2  AE L2  AE  3x10         Fwd lunges W/5#  DB  3x10 W/5#  DB  3x10 W/5#  DB  3x15 5#  DBs  3x15 5#  DB  3x15                                                                                                                                                                 "

## 2024-07-08 ENCOUNTER — OFFICE VISIT (OUTPATIENT)
Dept: PHYSICAL THERAPY | Facility: MEDICAL CENTER | Age: 64
End: 2024-07-08
Payer: MEDICARE

## 2024-07-08 DIAGNOSIS — M25.652 HIP STIFFNESS, LEFT: ICD-10-CM

## 2024-07-08 DIAGNOSIS — G89.18 ACUTE POST-OPERATIVE PAIN: ICD-10-CM

## 2024-07-08 DIAGNOSIS — M25.662 KNEE STIFFNESS, LEFT: Primary | ICD-10-CM

## 2024-07-08 PROCEDURE — 97110 THERAPEUTIC EXERCISES: CPT

## 2024-07-08 PROCEDURE — 97112 NEUROMUSCULAR REEDUCATION: CPT

## 2024-07-08 NOTE — PROGRESS NOTES
"Daily Note     Today's date: 2024  Patient name: Nadia Mata  : 1960  MRN: [id number]  Referring provider: Althea Greene CRNP  Dx:   Encounter Diagnosis     ICD-10-CM    1. Knee stiffness, left  M25.662       2. Hip stiffness, left  M25.652       3. Acute post-operative pain  G89.18                      Subjective: Pt reports that her L knee has full mobility, but it feels tight quite often.  Pt states that she is beginning to resume her activities and is going indoor skiing in a few days.        Objective: See treatment diary below      Assessment: Tolerated treatment well. Patient demonstrated fatigue post treatment, exhibited good technique with therapeutic exercises, and would benefit from continued PT  Pt is making steady progress towards her goals. Possible d/c after NV.        Plan: Continue per plan of care.      Precautions: s/p L distal femur ORIF      Ther Ex         Bike 10' 10' 10' 10' 10' 10'                      Heel slides 5\"  x30 x30 x30 x30 x30 x30        Prone knee/quad str 3x30\" 3x30\" 3x30\" 3x30\" 3x30\" 3x30\"        Prone knee flexion 4#  Shoe  3x10 5#  3x10 5#  3x10 5#  3x12 5#  3x15 5#  3x15        QS 5\"  x30 5\"  x30 5\"  x30 5\"  x30 5\"  x30 5\"  x30        SLR flexion  4#  Shoe  3x10 5#  3x10 5#  3x10 5#  3x12 5#  3x15 5#  3x15        HS and gastroc str 3x30\" 3x30\" 3x30\" 3x30\" 3x30\" 3x30\"        SLR add, abd, ext 4#  Shoe  3x10 5#  3x10 5#  3x10 5#  3x12 5#  3x15 5#  3x15        #  3x10 110#  3x10 120#  3x10 125#  3x10 125#  3x10 125#  3x12        LAQ 4#  3x10  shoe 5#  3x10 5#  3x10 5#  3x12 5#  3x15 5#  3x15        Standing hip abd 4#  3x10  shoe 5#  3x10 5#  3x15 5#  3x15 5#  3x15 5#  3x15        Wall slides W/  10#  DB  3x10 W/  2 10#  DB  3x10 W/  10#  DB  3x10 10#  Dbs  3x10 10#  DB  3x15 10#  DB  3x15        Lateral lunges W/5#  DB  3x10 W/5#  DB  3x10 W/5#  DB  3x15 5#  Dbs  3x15 5#  DB  3x15 5#  DB  3x15        Step downs L2  AE  3x10 " L2  AE  3x10 L2  AE  3x10 L2  AE L2  AE  3x10 L2  AE  3x10        Fwd lunges W/5#  DB  3x10 W/5#  DB  3x10 W/5#  DB  3x15 5#  DBs  3x15 5#  DB  3x15 5#  DB  3x15

## 2024-07-11 ENCOUNTER — OFFICE VISIT (OUTPATIENT)
Dept: PHYSICAL THERAPY | Facility: MEDICAL CENTER | Age: 64
End: 2024-07-11
Payer: MEDICARE

## 2024-07-11 DIAGNOSIS — G89.18 ACUTE POST-OPERATIVE PAIN: ICD-10-CM

## 2024-07-11 DIAGNOSIS — M25.652 HIP STIFFNESS, LEFT: ICD-10-CM

## 2024-07-11 DIAGNOSIS — M25.662 KNEE STIFFNESS, LEFT: Primary | ICD-10-CM

## 2024-07-11 PROCEDURE — 97110 THERAPEUTIC EXERCISES: CPT | Performed by: PHYSICAL THERAPIST

## 2024-07-11 PROCEDURE — 97112 NEUROMUSCULAR REEDUCATION: CPT | Performed by: PHYSICAL THERAPIST

## 2024-07-11 NOTE — PROGRESS NOTES
"Daily Note     Today's date: 2024  Patient name: Nadia Mata  : 1960  MRN: [id number]  Referring provider: Althea Greene CRNP  Dx:   Encounter Diagnosis     ICD-10-CM    1. Knee stiffness, left  M25.662       2. Acute post-operative pain  G89.18       3. Hip stiffness, left  M25.652                      Subjective: Pt reports that she was able to do some skiing at an indoor facility and it went well.        Objective: See treatment diary below      Assessment: Tolerated treatment well. Patient demonstrated fatigue post treatment, exhibited good technique with therapeutic exercises, and would benefit from continued PT      Plan: Continue per plan of care.      Precautions: s/p L distal femur ORIF      Ther Ex        Bike 10' 10' 10' 10' 10' 10' 10'                     Heel slides 5\"  x30 x30 x30 x30 x30 x30 x30       Prone knee/quad str 3x30\" 3x30\" 3x30\" 3x30\" 3x30\" 3x30\" 3x30\"       Prone knee flexion 4#  Shoe  3x10 5#  3x10 5#  3x10 5#  3x12 5#  3x15 5#  3x15 5#  3x15       QS 5\"  x30 5\"  x30 5\"  x30 5\"  x30 5\"  x30 5\"  x30 5\"  x30       SLR flexion  4#  Shoe  3x10 5#  3x10 5#  3x10 5#  3x12 5#  3x15 5#  3x15 5#  3x15       HS and gastroc str 3x30\" 3x30\" 3x30\" 3x30\" 3x30\" 3x30\" 3x30\"       SLR add, abd, ext 4#  Shoe  3x10 5#  3x10 5#  3x10 5#  3x12 5#  3x15 5#  3x15 5#  3x15       #  3x10 110#  3x10 120#  3x10 125#  3x10 125#  3x10 125#  3x12 125#  3x15       LAQ 4#  3x10  shoe 5#  3x10 5#  3x10 5#  3x12 5#  3x15 5#  3x15 5#  3x15       Standing hip abd 4#  3x10  shoe 5#  3x10 5#  3x15 5#  3x15 5#  3x15 5#  3x15 5#  3x15       Wall slides W/  10#  DB  3x10 W/  2 10#  DB  3x10 W/  10#  DB  3x10 10#  Dbs  3x10 10#  DB  3x15 10#  DB  3x15 10#  DB  3x15       Lateral lunges W/5#  DB  3x10 W/5#  DB  3x10 W/5#  DB  3x15 5#  Dbs  3x15 5#  DB  3x15 5#  DB  3x15 10#  KB  3x15       Step downs L2  AE  3x10 L2  AE  3x10 L2  AE  3x10 L2  AE L2  AE  3x10 L2  AE  3x10 " L2  AE  3x10       Fwd lunges W/5#  DB  3x10 W/5#  DB  3x10 W/5#  DB  3x15 5#  DBs  3x15 5#  DB  3x15 5#  DB  3x15 10#  DB  3x15

## 2024-07-18 ENCOUNTER — VBI (OUTPATIENT)
Dept: ADMINISTRATIVE | Facility: OTHER | Age: 64
End: 2024-07-18

## 2024-07-25 ENCOUNTER — OFFICE VISIT (OUTPATIENT)
Dept: PHYSICAL THERAPY | Facility: MEDICAL CENTER | Age: 64
End: 2024-07-25
Payer: MEDICARE

## 2024-07-25 DIAGNOSIS — M25.662 KNEE STIFFNESS, LEFT: Primary | ICD-10-CM

## 2024-07-25 DIAGNOSIS — G89.18 ACUTE POST-OPERATIVE PAIN: ICD-10-CM

## 2024-07-25 DIAGNOSIS — M25.652 HIP STIFFNESS, LEFT: ICD-10-CM

## 2024-07-25 PROCEDURE — 97112 NEUROMUSCULAR REEDUCATION: CPT | Performed by: PHYSICAL THERAPIST

## 2024-07-25 PROCEDURE — 97110 THERAPEUTIC EXERCISES: CPT | Performed by: PHYSICAL THERAPIST

## 2024-07-25 NOTE — PROGRESS NOTES
"Daily Note     Today's date: 2024  Patient name: Nadia Mata  : 1960  MRN: [id number]  Referring provider: Althea Greene CRNP  Dx:   Encounter Diagnosis     ICD-10-CM    1. Knee stiffness, left  M25.662       2. Acute post-operative pain  G89.18       3. Hip stiffness, left  M25.652                      Subjective: Pt reports that her knee is generally doing well.  She has some intermittent soreness/discomfort, but nothing that lingers.  She has returned to running, it is slow, but faster than a walk.       Objective: See treatment diary below      Assessment: Nadia Mata has been compliant with attending PT and home exercise program since initial eval.  Nadia  has made improvements in objective data since initial evalulation and has achieved all goals.  Patient reports having returned to their prior level or function.  It was mutually agreed to Discharge to home exercise program at this time.      Plan: D/C PT      Precautions: s/p L distal femur ORIF      Ther Ex       Bike 10' 10' 10' 10' 10' 10' 10' 10'                    Heel slides 5\"  x30 x30 x30 x30 x30 x30 x30 x30      Prone knee/quad str 3x30\" 3x30\" 3x30\" 3x30\" 3x30\" 3x30\" 3x30\" 3x30\"      Prone knee flexion 4#  Shoe  3x10 5#  3x10 5#  3x10 5#  3x12 5#  3x15 5#  3x15 5#  3x15 5\"  3x15      QS 5\"  x30 5\"  x30 5\"  x30 5\"  x30 5\"  x30 5\"  x30 5\"  x30 5\"  x30      SLR flexion  4#  Shoe  3x10 5#  3x10 5#  3x10 5#  3x12 5#  3x15 5#  3x15 5#  3x15 5#  3x15      HS and gastroc str 3x30\" 3x30\" 3x30\" 3x30\" 3x30\" 3x30\" 3x30\" 3x30\"      SLR add, abd, ext 4#  Shoe  3x10 5#  3x10 5#  3x10 5#  3x12 5#  3x15 5#  3x15 5#  3x15 5#  3x15      #  3x10 110#  3x10 120#  3x10 125#  3x10 125#  3x10 125#  3x12 125#  3x15 125#  3x15      LAQ 4#  3x10  shoe 5#  3x10 5#  3x10 5#  3x12 5#  3x15 5#  3x15 5#  3x15 5#  3x15      Standing hip abd 4#  3x10  shoe 5#  3x10 5#  3x15 5#  3x15 5#  3x15 5#  3x15 5#  3x15 5#  3x15      Wall " slides W/  10#  DB  3x10 W/  2 10#  DB  3x10 W/  10#  DB  3x10 10#  Dbs  3x10 10#  DB  3x15 10#  DB  3x15 10#  DB  3x15 10#  DB  3x15      Lateral lunges W/5#  DB  3x10 W/5#  DB  3x10 W/5#  DB  3x15 5#  Dbs  3x15 5#  DB  3x15 5#  DB  3x15 10#  KB  3x15 10#  3x15      Step downs L2  AE  3x10 L2  AE  3x10 L2  AE  3x10 L2  AE L2  AE  3x10 L2  AE  3x10 L2  AE  3x10 L2  AE  3x10      Fwd lunges W/5#  DB  3x10 W/5#  DB  3x10 W/5#  DB  3x15 5#  DBs  3x15 5#  DB  3x15 5#  DB  3x15 10#  DB  3x15 10#  DB   3x15

## 2024-07-29 ENCOUNTER — TELEPHONE (OUTPATIENT)
Age: 64
End: 2024-07-29

## 2024-07-29 NOTE — TELEPHONE ENCOUNTER
Patient called and stated that her appt on 4/16/24 was supposed to be her welcome to Medicare visit so they denied the claim.   They need this re coded.

## 2024-08-06 NOTE — TELEPHONE ENCOUNTER
I spoke to patient and she read the letter that she received from the insurance to me. I advised for her to bring it into the office we can make a copy of it and if we need help we can ask our management team. This way we can figure out better what would need to be changed.

## 2024-09-04 ENCOUNTER — HOSPITAL ENCOUNTER (OUTPATIENT)
Dept: BONE DENSITY | Facility: MEDICAL CENTER | Age: 64
Discharge: HOME | End: 2024-09-04
Payer: MEDICARE

## 2024-09-04 DIAGNOSIS — M85.88 OSTEOPENIA OF OTHER SITE: ICD-10-CM

## 2024-09-04 DIAGNOSIS — M85.859 OSTEOPENIA OF NECK OF FEMUR, UNSPECIFIED LATERALITY: ICD-10-CM

## 2024-09-04 PROCEDURE — 77080 DXA BONE DENSITY AXIAL: CPT

## 2024-10-09 ENCOUNTER — RA CDI HCC (OUTPATIENT)
Dept: OTHER | Facility: HOSPITAL | Age: 64
End: 2024-10-09

## 2024-10-15 ENCOUNTER — OFFICE VISIT (OUTPATIENT)
Dept: FAMILY MEDICINE CLINIC | Facility: CLINIC | Age: 64
End: 2024-10-15
Payer: MEDICARE

## 2024-10-15 VITALS
TEMPERATURE: 96.6 F | SYSTOLIC BLOOD PRESSURE: 90 MMHG | HEIGHT: 63 IN | OXYGEN SATURATION: 96 % | BODY MASS INDEX: 24.84 KG/M2 | HEART RATE: 67 BPM | WEIGHT: 140.2 LBS | DIASTOLIC BLOOD PRESSURE: 64 MMHG

## 2024-10-15 DIAGNOSIS — M25.512 CHRONIC LEFT SHOULDER PAIN: Primary | ICD-10-CM

## 2024-10-15 DIAGNOSIS — M85.88 OSTEOPENIA OF OTHER SITE: ICD-10-CM

## 2024-10-15 DIAGNOSIS — G89.29 CHRONIC LEFT SHOULDER PAIN: Primary | ICD-10-CM

## 2024-10-15 DIAGNOSIS — S42.292D CLOSED FRACTURE OF HEAD OF LEFT HUMERUS WITH ROUTINE HEALING, SUBSEQUENT ENCOUNTER: ICD-10-CM

## 2024-10-15 DIAGNOSIS — S72.492D OTHER CLOSED FRACTURE OF DISTAL END OF LEFT FEMUR WITH ROUTINE HEALING, SUBSEQUENT ENCOUNTER: ICD-10-CM

## 2024-10-15 DIAGNOSIS — E55.9 VITAMIN D DEFICIENCY: ICD-10-CM

## 2024-10-15 PROCEDURE — 99214 OFFICE O/P EST MOD 30 MIN: CPT | Performed by: FAMILY MEDICINE

## 2024-10-15 PROCEDURE — G2211 COMPLEX E/M VISIT ADD ON: HCPCS | Performed by: FAMILY MEDICINE

## 2024-10-15 NOTE — PROGRESS NOTES
Subjective:    HARPAL Zuniga is a 64 y.o. female here today for:  Chief Complaint   Patient presents with    Follow-up   .      ---Above per clinical staff & reviewed. ---  HPI:  [id number] here for follow up  Fractured femur in skiing accident last year  Has been on indoor slopes in [place]  Is working the season at [employer]  C/o left shoulder pain- history of humerus fracture  Recommended xray and PT, some rotator cuff muscle tenderness on exam and decreased rotation  Questioned vitamin D- taking over the counter supplement  Explained recommendations of vitamin D testing, would like rechecked         The following portions of the patient's history were reviewed and updated as appropriate: allergies, current medications, past family history, past medical history, past social history, past surgical history and problem list.    Past Medical History:   Diagnosis Date    DVT (deep venous thrombosis) (HCC) 1990    L. foot fx and was non-weight bearing    History of transfusion        Past Surgical History:   Procedure Laterality Date    EYE SURGERY      FINGER SURGERY      KNEE ARTHROSCOPY      ORIF FEMUR FRACTURE Left     NC REMOVAL IMPLANT DEEP Left 2/22/2024    Procedure: REMOVAL HARDWARE;  Surgeon: Viral Tanner DO;  Location:  MAIN OR;  Service: Orthopedics    WISDOM TOOTH EXTRACTION         Social History     Socioeconomic History    Marital status: /Civil Union     Spouse name: Adriel    Number of children: 2    Years of education: masters    Highest education level: Master's degree (e.g., MA, MS, Reed, MEd, MSW, NATHALY)   Occupational History    Occupation:      Employer: [employer]   Tobacco Use    Smoking status: Never    Smokeless tobacco: Never   Vaping Use    Vaping status: Never Used   Substance and Sexual Activity    Alcohol use: Yes     Comment: socially    Drug use: Never     Comment: No drug use - As per [employer]     Sexual activity: Yes     Partners: Male   Other  Topics Concern    None   Social History Narrative    None     Social Determinants of Health     Financial Resource Strain: Low Risk  (6/1/2021)    Overall Financial Resource Strain (CARDIA)     Difficulty of Paying Living Expenses: Not hard at all   Food Insecurity: No Food Insecurity (4/16/2024)    Hunger Vital Sign     Worried About Running Out of Food in the Last Year: Never true     Ran Out of Food in the Last Year: Never true   Transportation Needs: No Transportation Needs (4/16/2024)    PRAPARE - Transportation     Lack of Transportation (Medical): No     Lack of Transportation (Non-Medical): No   Physical Activity: Unknown (5/26/2020)    Exercise Vital Sign     Days of Exercise per Week: 4 days     Minutes of Exercise per Session: Not asked   Stress: No Stress Concern Present (5/26/2020)    [employer] - Occupational Stress Questionnaire     Feeling of Stress : Not at all   Social Connections: Unknown (5/26/2020)    Social Connection and Isolation Panel [NHANES]     Frequency of Communication with Friends and Family: Not on file     Frequency of Social Gatherings with Friends and Family: Not on file     Attends [employer]ism Services: Never     Active Member of Clubs or Organizations: Yes     Attends Club or Organization Meetings: Not on file     Marital Status:    Intimate Partner Violence: Not At Risk (5/26/2020)    Humiliation, Afraid, Rape, and Kick questionnaire     Fear of Current or Ex-Partner: No     Emotionally Abused: No     Physically Abused: No     Sexually Abused: No   Housing Stability: Unknown (4/16/2024)    Housing Stability Vital Sign     Unable to Pay for Housing in the Last Year: No     Number of Times Moved in the Last Year: Not on file     Homeless in the Last Year: No       Current Outpatient Medications   Medication Sig Dispense Refill    clobetasol (TEMOVATE) 0.05 % external solution clobetasol 0.05 % scalp solution      clobetasol (TEMOVATE) 0.05 %  "ointment Apply topically 2 (two) times a day 60 g 0    dextran 70-hypromellose (Artificial Tears) 0.1-0.3 % ophthalmic solution Apply 1 drop to eye      Nutritional Supplements (VITAMIN D BOOSTER PO) Take by mouth       No current facility-administered medications for this visit.        Review of Systems   Constitutional: Negative.  Negative for chills and fever.   HENT: Negative.  Negative for ear pain and sore throat.    Eyes:  Negative for pain and visual disturbance.   Respiratory: Negative.  Negative for cough and shortness of breath.    Cardiovascular: Negative.  Negative for chest pain and palpitations.   Gastrointestinal: Negative.  Negative for abdominal pain and vomiting.   Genitourinary: Negative.  Negative for dysuria and hematuria.   Musculoskeletal:  Negative for arthralgias and back pain.   Skin:  Negative for color change and rash.   Neurological: Negative.  Negative for seizures and syncope.   Psychiatric/Behavioral: Negative.     All other systems reviewed and are negative.       Objective:    BP 90/64 (BP Location: Left arm, Patient Position: Sitting, Cuff Size: Adult)   Pulse 67   Temp (!) 96.6 °F (35.9 °C) (Temporal)   Ht 5' 3\" (1.6 m)   Wt 63.6 kg (140 lb 3.2 oz)   SpO2 96%   BMI 24.84 kg/m²   Wt Readings from Last 3 Encounters:   10/15/24 63.6 kg (140 lb 3.2 oz)   04/23/24 62.6 kg (138 lb)   04/16/24 62.8 kg (138 lb 6.4 oz)     BP Readings from Last 3 Encounters:   10/15/24 90/64   04/23/24 112/75   04/16/24 118/80       Lab Results   Component Value Date    WBC 4.47 05/15/2024    HGB 9.9 (L) 05/15/2024    HCT 34.0 (L) 05/15/2024     05/15/2024    TRIG 65 05/15/2024    HDL 72 05/15/2024    ALT 25 05/15/2024    AST 26 05/15/2024    K 4.4 05/15/2024     05/15/2024    CREATININE 0.87 05/15/2024    BUN 15 05/15/2024    CO2 29 05/15/2024    GLUF 83 05/15/2024    HGBA1C 5.0 06/16/2022       Physical Exam  Vitals and nursing note reviewed.   Constitutional:       Appearance: Normal " appearance. She is well-developed.   HENT:      Head: Normocephalic and atraumatic.   Eyes:      Pupils: Pupils are equal, round, and reactive to light.   Cardiovascular:      Rate and Rhythm: Normal rate and regular rhythm.      Heart sounds: No murmur heard.  Pulmonary:      Effort: Pulmonary effort is normal. No respiratory distress.      Breath sounds: Normal breath sounds.   Musculoskeletal:         General: Tenderness present.      Cervical back: Normal range of motion and neck supple.      Comments: Normal ROM with mild crepitus  Decreased internal rotation  Tenderness with movement against resistance of rotator cuff   Skin:     General: Skin is warm.      Capillary Refill: Capillary refill takes less than 2 seconds.   Neurological:      Mental Status: She is alert and oriented to person, place, and time.      Cranial Nerves: No cranial nerve deficit.   Psychiatric:         Mood and Affect: Mood normal.         Thought Content: Thought content normal.                       Assessment/Plan:   Nadia was seen today for follow-up.    Diagnoses and all orders for this visit:    Chronic left shoulder pain  -     XR shoulder 2+ vw left; Future  -     Ambulatory Referral to Physical Therapy; Future    Vitamin D deficiency  -     Vitamin D 25 hydroxy; Future    Closed fracture of head of left humerus with routine healing, subsequent encounter    Other closed fracture of distal end of left femur with routine healing, subsequent encounter    Osteopenia of other site      Return in about 6 months (around 4/17/2025) for Annual physical.  There are no Patient Instructions on file for this visit.

## 2024-11-04 ENCOUNTER — APPOINTMENT (OUTPATIENT)
Dept: RADIOLOGY | Facility: MEDICAL CENTER | Age: 64
End: 2024-11-04
Payer: MEDICARE

## 2024-11-04 ENCOUNTER — APPOINTMENT (OUTPATIENT)
Dept: LAB | Facility: MEDICAL CENTER | Age: 64
End: 2024-11-04
Payer: MEDICARE

## 2024-11-04 DIAGNOSIS — M25.512 CHRONIC LEFT SHOULDER PAIN: ICD-10-CM

## 2024-11-04 DIAGNOSIS — E55.9 VITAMIN D DEFICIENCY: ICD-10-CM

## 2024-11-04 DIAGNOSIS — G89.29 CHRONIC LEFT SHOULDER PAIN: ICD-10-CM

## 2024-11-04 LAB — 25(OH)D3 SERPL-MCNC: 35.6 NG/ML (ref 30–100)

## 2024-11-04 PROCEDURE — 36415 COLL VENOUS BLD VENIPUNCTURE: CPT

## 2024-11-04 PROCEDURE — 73030 X-RAY EXAM OF SHOULDER: CPT

## 2024-11-04 PROCEDURE — 82306 VITAMIN D 25 HYDROXY: CPT

## 2024-11-19 ENCOUNTER — RESULTS FOLLOW-UP (OUTPATIENT)
Dept: FAMILY MEDICINE CLINIC | Facility: CLINIC | Age: 64
End: 2024-11-19

## 2025-03-07 ENCOUNTER — TELEPHONE (OUTPATIENT)
Age: 65
End: 2025-03-07

## 2025-03-07 NOTE — TELEPHONE ENCOUNTER
Patient called about a plan of care from  [facility]. Said they sent to us but never got back. The   Phone #  is [phone] I also gave the patient the correct fax # in case they had an incorrect one.

## 2025-04-11 ENCOUNTER — RA CDI HCC (OUTPATIENT)
Dept: OTHER | Facility: HOSPITAL | Age: 65
End: 2025-04-11

## 2025-04-24 ENCOUNTER — OFFICE VISIT (OUTPATIENT)
Dept: FAMILY MEDICINE CLINIC | Facility: CLINIC | Age: 65
End: 2025-04-24
Payer: MEDICARE

## 2025-04-24 VITALS
HEART RATE: 63 BPM | SYSTOLIC BLOOD PRESSURE: 102 MMHG | HEIGHT: 63 IN | BODY MASS INDEX: 24.98 KG/M2 | WEIGHT: 141 LBS | OXYGEN SATURATION: 98 % | DIASTOLIC BLOOD PRESSURE: 64 MMHG

## 2025-04-24 DIAGNOSIS — Z13.29 SCREENING FOR THYROID DISORDER: ICD-10-CM

## 2025-04-24 DIAGNOSIS — R32 URINARY INCONTINENCE, UNSPECIFIED TYPE: ICD-10-CM

## 2025-04-24 DIAGNOSIS — M85.88 OSTEOPENIA OF OTHER SITE: ICD-10-CM

## 2025-04-24 DIAGNOSIS — Z00.00 MEDICARE ANNUAL WELLNESS VISIT, SUBSEQUENT: Primary | ICD-10-CM

## 2025-04-24 DIAGNOSIS — Z13.6 SCREENING FOR CARDIOVASCULAR CONDITION: ICD-10-CM

## 2025-04-24 DIAGNOSIS — M04.8 OTHER AUTOINFLAMMATORY SYNDROMES (HCC): ICD-10-CM

## 2025-04-24 DIAGNOSIS — D56.1 BETA-THALASSEMIA (HCC): ICD-10-CM

## 2025-04-24 DIAGNOSIS — D64.89 ANEMIA DUE TO OTHER CAUSE, NOT CLASSIFIED: ICD-10-CM

## 2025-04-24 DIAGNOSIS — E55.9 VITAMIN D DEFICIENCY: ICD-10-CM

## 2025-04-24 PROCEDURE — G0439 PPPS, SUBSEQ VISIT: HCPCS | Performed by: FAMILY MEDICINE

## 2025-04-24 NOTE — ASSESSMENT & PLAN NOTE
Check CBC    Orders:    CBC and differential; Future    Comprehensive metabolic panel; Future    Vitamin D 25 hydroxy; Future    TSH, 3rd generation with Free T4 reflex; Future

## 2025-04-24 NOTE — PATIENT INSTRUCTIONS
Medicare Preventive Visit Patient Instructions  Thank you for completing your Welcome to Medicare Visit or Medicare Annual Wellness Visit today. Your next wellness visit will be due in one year (4/25/2026).  The screening/preventive services that you may require over the next 5-10 years are detailed below. Some tests may not apply to you based off risk factors and/or age. Screening tests ordered at today's visit but not completed yet may show as past due. Also, please note that scanned in results may not display below.  Preventive Screenings:  Service Recommendations Previous Testing/Comments   Colorectal Cancer Screening  * Colonoscopy    * Fecal Occult Blood Test (FOBT)/Fecal Immunochemical Test (FIT)  * Fecal DNA/Cologuard Test  * Flexible Sigmoidoscopy Age: 45-75 years old   Colonoscopy: every 10 years (may be performed more frequently if at higher risk)  OR  FOBT/FIT: every 1 year  OR  Cologuard: every 3 years  OR  Sigmoidoscopy: every 5 years  Screening may be recommended earlier than age 45 if at higher risk for colorectal cancer. Also, an individualized decision between you and your healthcare provider will decide whether screening between the ages of 76-85 would be appropriate. Colonoscopy: 11/11/2020  FOBT/FIT: Not on file  Cologuard: Not on file  Sigmoidoscopy: Not on file    Screening Current     Breast Cancer Screening Age: 40+ years old  Frequency: every 1-2 years  Not required if history of left and right mastectomy Mammogram: 07/17/2024    Screening Current   Cervical Cancer Screening Between the ages of 21-29, pap smear recommended once every 3 years.   Between the ages of 30-65, can perform pap smear with HPV co-testing every 5 years.   Recommendations may differ for women with a history of total hysterectomy, cervical cancer, or abnormal pap smears in past. Pap Smear: 01/08/2018    Screening Not Indicated   Hepatitis C Screening Once for adults born between 1945 and 1965  More frequently in  patients at high risk for Hepatitis C Hep C Antibody: Not on file    Screening Current   Diabetes Screening 1-2 times per year if you're at risk for diabetes or have pre-diabetes Fasting glucose: 83 mg/dL (5/15/2024)  A1C: 5.0 % (6/16/2022)  Screening Current   Cholesterol Screening Once every 5 years if you don't have a lipid disorder. May order more often based on risk factors. Lipid panel: 05/15/2024    Screening Current     Other Preventive Screenings Covered by Medicare:  Abdominal Aortic Aneurysm (AAA) Screening: covered once if your at risk. You're considered to be at risk if you have a family history of AAA.  Lung Cancer Screening: covers low dose CT scan once per year if you meet all of the following conditions: (1) Age 55-77; (2) No signs or symptoms of lung cancer; (3) Current smoker or have quit smoking within the last 15 years; (4) You have a tobacco smoking history of at least 20 pack years (packs per day multiplied by number of years you smoked); (5) You get a written order from a healthcare provider.  Glaucoma Screening: covered annually if you're considered high risk: (1) You have diabetes OR (2) Family history of glaucoma OR (3)  aged 50 and older OR (4)  American aged 65 and older  Osteoporosis Screening: covered every 2 years if you meet one of the following conditions: (1) You're estrogen deficient and at risk for osteoporosis based off medical history and other findings; (2) Have a vertebral abnormality; (3) On glucocorticoid therapy for more than 3 months; (4) Have primary hyperparathyroidism; (5) On osteoporosis medications and need to assess response to drug therapy.   Last bone density test (DXA Scan): 09/09/2024.  HIV Screening: covered annually if you're between the age of 15-65. Also covered annually if you are younger than 15 and older than 65 with risk factors for HIV infection. For pregnant patients, it is covered up to 3 times per  pregnancy.    Immunizations:  Immunization Recommendations   Influenza Vaccine Annual influenza vaccination during flu season is recommended for all persons aged >= 6 months who do not have contraindications   Pneumococcal Vaccine   * Pneumococcal conjugate vaccine = PCV13 (Prevnar 13), PCV15 (Vaxneuvance), PCV20 (Prevnar 20)  * Pneumococcal polysaccharide vaccine = PPSV23 (Pneumovax) Adults 19-65 yo with certain risk factors or if 65+ yo  If never received any pneumonia vaccine: recommend Prevnar 20 (PCV20)  Give PCV20 if previously received 1 dose of PCV13 or PPSV23   Hepatitis B Vaccine 3 dose series if at intermediate or high risk (ex: diabetes, end stage renal disease, liver disease)   Respiratory syncytial virus (RSV) Vaccine - COVERED BY MEDICARE PART D  * RSVPreF3 (Arexvy) CDC recommends that adults 60 years of age and older may receive a single dose of RSV vaccine using shared clinical decision-making (SCDM)   Tetanus (Td) Vaccine - COST NOT COVERED BY MEDICARE PART B Following completion of primary series, a booster dose should be given every 10 years to maintain immunity against tetanus. Td may also be given as tetanus wound prophylaxis.   Tdap Vaccine - COST NOT COVERED BY MEDICARE PART B Recommended at least once for all adults. For pregnant patients, recommended with each pregnancy.   Shingles Vaccine (Shingrix) - COST NOT COVERED BY MEDICARE PART B  2 shot series recommended in those 19 years and older who have or will have weakened immune systems or those 50 years and older     Health Maintenance Due:      Topic Date Due   • Breast Cancer Screening: Mammogram  07/17/2025   • Hepatitis C Screening  07/20/2025 (Originally 1960)   • Colorectal Cancer Screening  11/11/2030     Immunizations Due:  There are no preventive care reminders to display for this patient.  Advance Directives   What are advance directives?  Advance directives are legal documents that state your wishes and plans for medical  care. These plans are made ahead of time in case you lose your ability to make decisions for yourself. Advance directives can apply to any medical decision, such as the treatments you want, and if you want to donate organs.   What are the types of advance directives?  There are many types of advance directives, and each state has rules about how to use them. You may choose a combination of any of the following:  Living will:  This is a written record of the treatment you want. You can also choose which treatments you do not want, which to limit, and which to stop at a certain time. This includes surgery, medicine, IV fluid, and tube feedings.   Durable power of  for healthcare (DPAHC):  This is a written record that states who you want to make healthcare choices for you when you are unable to make them for yourself. This person, called a proxy, is usually a family member or a friend. You may choose more than 1 proxy.  Do not resuscitate (DNR) order:  A DNR order is used in case your heart stops beating or you stop breathing. It is a request not to have certain forms of treatment, such as CPR. A DNR order may be included in other types of advance directives.  Medical directive:  This covers the care that you want if you are in a coma, near death, or unable to make decisions for yourself. You can list the treatments you want for each condition. Treatment may include pain medicine, surgery, blood transfusions, dialysis, IV or tube feedings, and a ventilator (breathing machine).  Values history:  This document has questions about your views, beliefs, and how you feel and think about life. This information can help others choose the care that you would choose.  Why are advance directives important?  An advance directive helps you control your care. Although spoken wishes may be used, it is better to have your wishes written down. Spoken wishes can be misunderstood, or not followed. Treatments may be given even if  you do not want them. An advance directive may make it easier for your family to make difficult choices about your care.   Urinary Incontinence   Urinary incontinence (UI)  is when you lose control of your bladder. UI develops because your bladder cannot store or empty urine properly. The 3 most common types of UI are stress incontinence, urge incontinence, or both.  Medicines:   May be given to help strengthen your bladder control. Report any side effects of medication to your healthcare provider.  Do pelvic muscle exercises often:  Your pelvic muscles help you stop urinating. Squeeze these muscles tight for 5 seconds, then relax for 5 seconds. Gradually work up to squeezing for 10 seconds. Do 3 sets of 15 repetitions a day, or as directed. This will help strengthen your pelvic muscles and improve bladder control.  Train your bladder:  Go to the bathroom at set times, such as every 2 hours, even if you do not feel the urge to go. You can also try to hold your urine when you feel the urge to go. For example, hold your urine for 5 minutes when you feel the urge to go. As that becomes easier, hold your urine for 10 minutes.   Self-care:   Keep a UI record.  Write down how often you leak urine and how much you leak. Make a note of what you were doing when you leaked urine.  Drink liquids as directed. You may need to limit the amount of liquid you drink to help control your urine leakage. Do not drink any liquid right before you go to bed. Limit or do not have drinks that contain caffeine or alcohol.   Prevent constipation.  Eat a variety of high-fiber foods. Good examples are high-fiber cereals, beans, vegetables, and whole-grain breads. Walking is the best way to trigger your intestines to have a bowel movement.  Exercise regularly and maintain a healthy weight.  Weight loss and exercise will decrease pressure on your bladder and help you control your leakage.   Use a catheter as directed  to help empty your bladder.  "A catheter is a tiny, plastic tube that is put into your bladder to drain your urine.   Go to behavior therapy as directed.  Behavior therapy may be used to help you learn to control your urge to urinate.     © Copyright [employer] 2018 Information is for End User's use only and may not be sold, redistributed or otherwise used for commercial purposes. All illustrations and images included in CareNotes® are the copyrighted property of [employer]. or [employer]      Patient Education     Pelvic floor muscle exercises   The Basics   Written by the doctors and editors at [facility]   What is the pelvic floor? -- The \"pelvic floor\" is the name for the muscles that support the organs in the pelvis. These organs include the bladder and rectum. In the female pelvis, they also include the uterus (figure 1).  What are pelvic floor muscle exercises? -- These are exercises that can make your pelvic floor muscles stronger. They involve learning ways to tighten and relax specific muscles.  Pelvic floor muscle exercises can help keep you from leaking urine, gas, or bowel movements. They can also help with a condition called \"pelvic organ prolapse.\" This is when the organs in the lower belly drop down and press against or bulge into the vagina.  One way to strengthen your pelvic floor muscles is to do exercises. These are also known as \"Kegel\" exercises.  How do I do pelvic floor muscle exercises? -- If you want to try pelvic floor muscle exercises, start by talking to your doctor or nurse. They can talk to you about whether these exercises can help you. They can also teach you how to do them correctly.  You will need to learn which muscles to tighten and relax. It is sometimes hard to figure out the right muscles.  Some ways you can practice:   People with female or male anatomy - Squeeze the muscles you would use to avoid passing gas.   People with female anatomy - Put a finger inside your vagina and squeeze the " "muscles around your finger. Or you can imagine that you are sitting on a marble and have to pick it up using your vagina.   People with male anatomy - Squeeze the muscles that control the flow of urine. These exercises might help reduce urine leaks in people who have had surgery to treat prostate cancer or an enlarged prostate.  No matter how you learn to do pelvic floor muscle exercises, know that the muscles involved are not in your belly, thighs, or buttocks.  After you learn which muscles to tighten and relax, you can do the exercises in any position (standing, sitting, or lying down).  Should I see a physical therapist? -- Your doctor or nurse might suggest working with a physical therapist who has special training in pelvic floor issues. They can check your muscle strength and teach you specific exercises.  How often should I do the exercises? -- A common approach is to try to do a set of the exercises 3 times a day.  For each set, do the following about 10 times:   Squeeze your pelvic muscles.   Hold the muscles tight for about 10 seconds.   Relax the muscles completely.  Keep up this routine for at least a few months. You will probably notice results, but it might take a few weeks to several months.  How do pelvic floor muscle exercises help? -- Pelvic floor muscle exercises can help:   Prevent urine leaks in people who have \"stress incontinence\" - This means that they leak urine when they cough, laugh, sneeze, or strain.   Control sudden urges to urinate - These happen to people with \"urinary urgency\" or \"urge incontinence.\"   Control the release of gas or bowel movements   Improve symptoms caused by pelvic organ prolapse - These can include pressure or bulging in the vagina. If you have these symptoms, see your doctor or nurse to find out the cause.  It might take a few months of doing the exercises regularly before you notice them working. If you have been doing pelvic floor muscle exercises for several " "months and they don't seem to be making a difference, tell your doctor or nurse. They might suggest seeing a physical therapist or trying other treatments for your condition.  All topics are updated as new evidence becomes available and our peer review process is complete.  This topic retrieved from [email or web address] on: Feb 26, 2024.  Topic 82980 Version 15.0  Release: 32.2.4 - C32.56  © 2024 UpToDate, Inc. and/or its affiliates. All rights reserved.  figure 1: Pelvic floor muscles     The \"pelvic floor\" is a group of muscles that support the organs in the pelvis. In females, these organs include the uterus, bladder, and rectum.  Graphic 692000 Version 2.0  Consumer Information Use and Disclaimer   Disclaimer: This generalized information is a limited summary of diagnosis, treatment, and/or medication information. It is not meant to be comprehensive and should be used as a tool to help the user understand and/or assess potential diagnostic and treatment options. It does NOT include all information about conditions, treatments, medications, side effects, or risks that may apply to a specific patient. It is not intended to be medical advice or a substitute for the medical advice, diagnosis, or treatment of a health care provider based on the health care provider's examination and assessment of a patient's specific and unique circumstances. Patients must speak with a health care provider for complete information about their health, medical questions, and treatment options, including any risks or benefits regarding use of medications. This information does not endorse any treatments or medications as safe, effective, or approved for treating a specific patient. UpToDate, Inc. and its affiliates disclaim any warranty or liability relating to this information or the use thereof.The use of this information is governed by the Terms of Use, available at https://www.HiringSolveduwer.com/en/know/clinical-effectiveness-terms. 2024© " "UpToDate, Inc. and its affiliates and/or licensors. All rights reserved.  Copyright   © 2024 UpToDate, Inc. and/or its affiliates. All rights reserved.    Patient Education     Bladder training   The Basics   Written by the doctors and editors at [email or web address]   What is bladder training? -- Bladder training means changing your habits to better control your bladder (figure 1). It can help with urinary problems like:   Frequency - This means having to use the toilet very frequently.   Urgency - This means suddenly feeling the need to urinate right away.   Leakage - This is when urgency leads to actually leaking urine. It is also called \"urge incontinence.\"  Talk to your doctor or nurse before trying bladder training on your own. They will want to make sure that you do it correctly. They might also want to check for other problems, such as a urinary tract infection.  What is a bladder diary? -- Bladder training involves trying to increase the amount of time between trips to the toilet. A \"bladder diary\" is a way to keep track of how often you go (form 1). Doctors sometimes call this a \"voiding diary.\"  In the diary, write down:   The time you urinate   The amount of urine, if your doctor asked you to measure this   If you had any leaking, how much (small, medium, or large amount), and what you were doing when the leakage happened   The amounts and types of fluids you drink   Any other symptoms you have  How do I train my bladder? -- Once you have an idea of how often you are urinating, try to wait a little longer between trips to the toilet. This should be a gradual process:   First, slightly increase the amount of time between bathroom visits. For example, if you normally go every hour, add 15 minutes. This means trying to wait 1 hour and 15 minutes between trips to the bathroom.   Keep following this schedule for several days. If you can do this for 3 or 4 days without problems, increase the time again. For example, you " "can add 15 more minutes between trips to the bathroom.   Keep doing this until you can wait at least 2 to 3 hours between bathroom visits. It can take time to get to this point. Some people notice improvement within a few weeks of bladder training. But it can take longer.  Keep filling out your bladder diary as you work on bladder training. This will help you see improvement over time. The process might take several weeks.  What else can I do to help with urgency? -- Part of bladder training involves learning to control the urge to urinate and make your bladder wait. Some things you can do to help with this:   Squeeze your pelvic muscles - These include the muscles that control the flow of urine. Try squeezing the muscles and then relaxing them. Repeat this several times.   Change positions - It might help to cross your legs or sit on a firm surface.   Distract your mind - Try to think about something else until the urge passes.   Relax - Stay still when you get the urge to urinate. It might help to do deep breathing exercises. Do not rush to the toilet when it is time to go.  What else should I know? -- These tips might help with bladder training:   Try to only urinate when you actually need to go - For example, avoid going to the bathroom before leaving home \"just in case.\" This can train your brain to think that your bladder is full when it really isn't.   Drink fluids throughout the day - Make sure that you drink enough fluids to stay hydrated. This usually means about 8 cups (64 ounces) a day. Try to spread this throughout the day instead of drinking a lot all at once. If you usually drink a lot more than this, ask your doctor or nurse if it is OK for you to reduce the amount.   Avoid drinking fluids soon before bedtime - This can lower the chances that you will need to urinate during the night.   Limit or avoid alcohol and caffeine - Some people find that these things make them need to urinate more.  When " "should I call the doctor? -- Call your doctor or nurse if:   Your urinary symptoms are not getting better or are getting worse.   You are having trouble with your training schedule - Bladder training can be frustrating and take time. But don't give up. Your doctor or nurse can help you.   You have other problems with urinating, such as pain or burning, not being able to urinate, or seeing blood in your urine.  All topics are updated as new evidence becomes available and our peer review process is complete.  This topic retrieved from [employer] on: Mar 16, 2024.  Topic 468670 Version 3.0  Release: 32.2.4 - C32.74  © 2024 UpToDate, Inc. and/or its affiliates. All rights reserved.  figure 1: Anatomy of the urinary tract     Urine is made by the kidneys. It passes from the kidneys into the bladder through 2 tubes called the ureters. Then, it leaves the bladder through another tube called the urethra.  Graphic 20171 Version 8.0  form 1: Voiding diary     To use this diary, make a note of these things every time you urinate:  The time (for example, \"10:30 AM\")  The amount of urine, if your doctor asked you to measure this  Whether you leaked any urine, and about how much (for example, small/medium/large, or whether or not you needed to change your underwear)  When you went to bed and woke up, what you had to drink, and anything else that might have caused you to urinate (for example, \"just had coffee,\" \"coughed,\" \"was running to the bathroom,\" or \"just took my water pill\")  Start the record in the morning the first time you go to the bathroom after you get up.  Graphic 255032 Version 1.0  Consumer Information Use and Disclaimer   Disclaimer: This generalized information is a limited summary of diagnosis, treatment, and/or medication information. It is not meant to be comprehensive and should be used as a tool to help the user understand and/or assess potential diagnostic and treatment options. It does NOT include all " information about conditions, treatments, medications, side effects, or risks that may apply to a specific patient. It is not intended to be medical advice or a substitute for the medical advice, diagnosis, or treatment of a health care provider based on the health care provider's examination and assessment of a patient's specific and unique circumstances. Patients must speak with a health care provider for complete information about their health, medical questions, and treatment options, including any risks or benefits regarding use of medications. This information does not endorse any treatments or medications as safe, effective, or approved for treating a specific patient. UpToDate, Inc. and its affiliates disclaim any warranty or liability relating to this information or the use thereof.The use of this information is governed by the Terms of Use, available at https://www.woltersPostHelpersuwer.com/en/know/clinical-effectiveness-terms. 2024© UpToDate, Inc. and its affiliates and/or licensors. All rights reserved.  Copyright   © 2024 UpToDate, Inc. and/or its affiliates. All rights reserved.

## 2025-04-24 NOTE — PROGRESS NOTES
Name: Nadia Mata      : 1960      MRN: [id number]  Encounter Provider: Virginia Narayan MD  Encounter Date: 2025   Encounter department: [facility]  :  Assessment & Plan  Medicare annual wellness visit, subsequent  Health maintenance reviewed  Labs ordered  Orders:    CBC and differential; Future    Comprehensive metabolic panel; Future    Vitamin D 25 hydroxy; Future    Vitamin D deficiency  Taking 50,000 units daily  Orders:    CBC and differential; Future    Comprehensive metabolic panel; Future    Vitamin D 25 hydroxy; Future    TSH, 3rd generation with Free T4 reflex; Future    Screening for thyroid disorder    Orders:    CBC and differential; Future    Comprehensive metabolic panel; Future    Vitamin D 25 hydroxy; Future    TSH, 3rd generation with Free T4 reflex; Future    Beta-thalassemia (HCC)   Check CBC    Orders:    CBC and differential; Future    Comprehensive metabolic panel; Future    Vitamin D 25 hydroxy; Future    TSH, 3rd generation with Free T4 reflex; Future    Osteopenia of other site    Orders:    CBC and differential; Future    Comprehensive metabolic panel; Future    Vitamin D 25 hydroxy; Future    Other autoinflammatory syndromes (HCC)         Anemia due to other cause, not classified  Beta thalassemia    Orders:    TSH, 3rd generation with Free T4 reflex; Future    Screening for cardiovascular condition    Orders:    Lipid panel; Future    CBC and differential; Future    Comprehensive metabolic panel; Future    Vitamin D 25 hydroxy; Future    TSH, 3rd generation with Free T4 reflex; Future    Urinary incontinence, unspecified type  Mostly overflow, discussed timed emptying of bladder         Depression Screening and Follow-up Plan: Patient was screened for depression during today's encounter. They screened negative with a PHQ-2 score of 0.      Urinary Incontinence Plan of Care: counseling topics discussed: use restroom every 2 hours and limit alcohol,  caffeine, spicy foods, and acidic foods.       Preventive health issues were discussed with patient, and age appropriate screening tests were ordered as noted in patient's After Visit Summary. Personalized health advice and appropriate referrals for health education or preventive services given if needed, as noted in patient's After Visit Summary.    History of Present Illness     HPI   64yof here for well exam  Doing really good, had a good ski season  No complaints or concerns  Has some overflow incontinence, especially when traveling   Discussed emptying bladder very 2-3 hours  Vitamin D was low- took prescription and then 2000iu daily, then increased to 50,000iu daily  Will recheck  History of beta thalassemia  No complaints or concerns  Going to [facility] August 2026 to ski trip    PHQ-2/9 Depression Screening    Little interest or pleasure in doing things: 0 - not at all  Feeling down, depressed, or hopeless: 0 - not at all  PHQ-2 Score: 0  PHQ-2 Interpretation: Negative depression screen         Patient Care Team:  Virginia Narayan MD as PCP - General (Family Medicine)  Virginia Narayan MD as PCP - PCP-[facility] Attributed-Roster    Review of Systems   Constitutional: Negative.  Negative for chills and fever.   HENT: Negative.  Negative for ear pain and sore throat.    Eyes:  Negative for pain and visual disturbance.   Respiratory: Negative.  Negative for cough and shortness of breath.    Cardiovascular: Negative.  Negative for chest pain and palpitations.   Gastrointestinal: Negative.  Negative for abdominal pain and vomiting.   Genitourinary:  Positive for urgency. Negative for dysuria and hematuria.   Musculoskeletal:  Negative for arthralgias and back pain.   Skin:  Negative for color change and rash.   Neurological: Negative.  Negative for seizures and syncope.   Psychiatric/Behavioral: Negative.     All other systems reviewed and are negative.    Medical History Reviewed by provider this encounter:   Tobacco  Allergies  Meds  Problems  Med Hx  Surg Hx  Fam Hx       Annual Wellness Visit Questionnaire       Health Risk Assessment:   Patient rates overall health as excellent. Patient feels that their physical health rating is much better. Patient is very satisfied with their life. Eyesight was rated as same. Hearing was rated as same. Patient feels that their emotional and mental health rating is same. Patients states they are never, rarely angry. Patient states they are never, rarely unusually tired/fatigued. Pain experienced in the last 7 days has been none. Patient states that she has experienced no weight loss or gain in last 6 months.     Depression Screening:   PHQ-2 Score: 0      Fall Risk Screening:   In the past year, patient has experienced: no history of falling in past year      Urinary Incontinence Screening:   Patient has leaked urine accidently in the last six months.     Home Safety:  Patient does not have trouble with stairs inside or outside of their home. Patient has working smoke alarms and has no working carbon monoxide detector. Home safety hazards include: none.     Nutrition:   Current diet is Regular.     Medications:   Patient is currently taking over-the-counter supplements. OTC medications include: see medication list. Patient is able to manage medications.     Activities of Daily Living (ADLs)/Instrumental Activities of Daily Living (IADLs):   Walk and transfer into and out of bed and chair?: Yes  Dress and groom yourself?: Yes    Bathe or shower yourself?: Yes    Feed yourself? Yes  Do your laundry/housekeeping?: Yes  Manage your money, pay your bills and track your expenses?: Yes  Make your own meals?: Yes    Do your own shopping?: Yes    Previous Hospitalizations:   Any hospitalizations or ED visits within the last 12 months?: No      Preventive Screenings      Cardiovascular Screening:    General: Screening Current      Diabetes Screening:     General: Screening Current      " Colorectal Cancer Screening:     General: Screening Current      Breast Cancer Screening:     General: Screening Current      Cervical Cancer Screening:    General: Screening Not Indicated      Lung Cancer Screening:     General: Screening Not Indicated      Hepatitis C Screening:    General: Screening Current    Immunizations:  - Immunizations due: Influenza, Prevnar 20 and Zoster (Shingrix)    Screening, Brief Intervention, and Referral to Treatment (SBIRT)     Screening  Typical number of drinks in a day: 0  Typical number of drinks in a week: 0  Interpretation: Low risk drinking behavior.    Single Item Drug Screening:  How often have you used an illegal drug (including marijuana) or a prescription medication for non-medical reasons in the past year? never    Single Item Drug Screen Score: 0  Interpretation: Negative screen for possible drug use disorder    Social Drivers of Health     Financial Resource Strain: Low Risk  (6/1/2021)    Overall Financial Resource Strain (CARDIA)     Difficulty of Paying Living Expenses: Not hard at all   Food Insecurity: No Food Insecurity (4/24/2025)    Hunger Vital Sign     Worried About Running Out of Food in the Last Year: Never true     Ran Out of Food in the Last Year: Never true   Transportation Needs: No Transportation Needs (4/24/2025)    PRAPARE - Transportation     Lack of Transportation (Medical): No     Lack of Transportation (Non-Medical): No   Housing Stability: Low Risk  (4/24/2025)    Housing Stability Vital Sign     Unable to Pay for Housing in the Last Year: No     Number of Times Moved in the Last Year: 1     Homeless in the Last Year: No   Utilities: Not At Risk (4/24/2025)    [facility] Utilities     Threatened with loss of utilities: No     No results found.    Objective   /64 (BP Location: Left arm, Patient Position: Sitting, Cuff Size: Large)   Pulse 63   Ht 5' 3\" (1.6 m)   Wt 64 kg (141 lb)   SpO2 98%   BMI 24.98 kg/m²     Physical Exam  Vitals and " nursing note reviewed.   Constitutional:       General: She is not in acute distress.     Appearance: Normal appearance. She is well-developed.   HENT:      Head: Normocephalic and atraumatic.      Right Ear: Tympanic membrane normal.      Left Ear: Tympanic membrane normal.      Nose: Nose normal.      Mouth/Throat:      Mouth: Mucous membranes are moist.   Eyes:      Extraocular Movements: Extraocular movements intact.      Conjunctiva/sclera: Conjunctivae normal.      Pupils: Pupils are equal, round, and reactive to light.   Cardiovascular:      Rate and Rhythm: Normal rate and regular rhythm.      Pulses: Normal pulses.      Heart sounds: Normal heart sounds. No murmur heard.  Pulmonary:      Effort: Pulmonary effort is normal. No respiratory distress.      Breath sounds: Normal breath sounds.   Abdominal:      General: Bowel sounds are normal. There is no distension.      Palpations: Abdomen is soft.      Tenderness: There is no abdominal tenderness.   Musculoskeletal:         General: No swelling.      Cervical back: Normal range of motion and neck supple.   Skin:     General: Skin is warm and dry.      Capillary Refill: Capillary refill takes less than 2 seconds.   Neurological:      General: No focal deficit present.      Mental Status: She is alert and oriented to person, place, and time.   Psychiatric:         Mood and Affect: Mood normal.         Behavior: Behavior normal.

## 2025-05-22 ENCOUNTER — APPOINTMENT (OUTPATIENT)
Dept: LAB | Facility: CLINIC | Age: 65
End: 2025-05-22
Payer: MEDICARE

## 2025-05-22 DIAGNOSIS — E55.9 VITAMIN D DEFICIENCY: ICD-10-CM

## 2025-05-22 DIAGNOSIS — D64.89 ANEMIA DUE TO OTHER CAUSE, NOT CLASSIFIED: ICD-10-CM

## 2025-05-22 DIAGNOSIS — Z13.29 SCREENING FOR THYROID DISORDER: ICD-10-CM

## 2025-05-22 DIAGNOSIS — M85.88 OSTEOPENIA OF OTHER SITE: ICD-10-CM

## 2025-05-22 DIAGNOSIS — Z13.6 SCREENING FOR CARDIOVASCULAR CONDITION: ICD-10-CM

## 2025-05-22 DIAGNOSIS — D56.1 BETA-THALASSEMIA (HCC): ICD-10-CM

## 2025-05-22 DIAGNOSIS — Z00.00 MEDICARE ANNUAL WELLNESS VISIT, SUBSEQUENT: ICD-10-CM

## 2025-05-22 LAB
25(OH)D3 SERPL-MCNC: 30.4 NG/ML (ref 30–100)
ALBUMIN SERPL BCG-MCNC: 4.3 G/DL (ref 3.5–5)
ALP SERPL-CCNC: 47 U/L (ref 34–104)
ALT SERPL W P-5'-P-CCNC: 28 U/L (ref 7–52)
ANION GAP SERPL CALCULATED.3IONS-SCNC: 9 MMOL/L (ref 4–13)
AST SERPL W P-5'-P-CCNC: 29 U/L (ref 13–39)
BASOPHILS # BLD AUTO: 0.05 THOUSANDS/ÂΜL (ref 0–0.1)
BASOPHILS NFR BLD AUTO: 1 % (ref 0–1)
BILIRUB SERPL-MCNC: 0.85 MG/DL (ref 0.2–1)
BUN SERPL-MCNC: 16 MG/DL (ref 5–25)
CALCIUM SERPL-MCNC: 9.5 MG/DL (ref 8.4–10.2)
CHLORIDE SERPL-SCNC: 104 MMOL/L (ref 96–108)
CHOLEST SERPL-MCNC: 186 MG/DL (ref ?–200)
CO2 SERPL-SCNC: 30 MMOL/L (ref 21–32)
CREAT SERPL-MCNC: 0.8 MG/DL (ref 0.6–1.3)
EOSINOPHIL # BLD AUTO: 0.34 THOUSAND/ÂΜL (ref 0–0.61)
EOSINOPHIL NFR BLD AUTO: 8 % (ref 0–6)
ERYTHROCYTE [DISTWIDTH] IN BLOOD BY AUTOMATED COUNT: 17.8 % (ref 11.6–15.1)
GFR SERPL CREATININE-BSD FRML MDRD: 77 ML/MIN/1.73SQ M
GLUCOSE P FAST SERPL-MCNC: 86 MG/DL (ref 65–99)
HCT VFR BLD AUTO: 36.4 % (ref 34.8–46.1)
HDLC SERPL-MCNC: 89 MG/DL
HGB BLD-MCNC: 10.8 G/DL (ref 11.5–15.4)
IMM GRANULOCYTES # BLD AUTO: 0.01 THOUSAND/UL (ref 0–0.2)
IMM GRANULOCYTES NFR BLD AUTO: 0 % (ref 0–2)
LDLC SERPL CALC-MCNC: 83 MG/DL (ref 0–100)
LYMPHOCYTES # BLD AUTO: 1.99 THOUSANDS/ÂΜL (ref 0.6–4.47)
LYMPHOCYTES NFR BLD AUTO: 44 % (ref 14–44)
MCH RBC QN AUTO: 18.9 PG (ref 26.8–34.3)
MCHC RBC AUTO-ENTMCNC: 29.7 G/DL (ref 31.4–37.4)
MCV RBC AUTO: 64 FL (ref 82–98)
MONOCYTES # BLD AUTO: 0.39 THOUSAND/ÂΜL (ref 0.17–1.22)
MONOCYTES NFR BLD AUTO: 9 % (ref 4–12)
NEUTROPHILS # BLD AUTO: 1.68 THOUSANDS/ÂΜL (ref 1.85–7.62)
NEUTS SEG NFR BLD AUTO: 38 % (ref 43–75)
NONHDLC SERPL-MCNC: 97 MG/DL
NRBC BLD AUTO-RTO: 0 /100 WBCS
PLATELET # BLD AUTO: 239 THOUSANDS/UL (ref 149–390)
PMV BLD AUTO: 10.1 FL (ref 8.9–12.7)
POTASSIUM SERPL-SCNC: 4 MMOL/L (ref 3.5–5.3)
PROT SERPL-MCNC: 7.7 G/DL (ref 6.4–8.4)
RBC # BLD AUTO: 5.7 MILLION/UL (ref 3.81–5.12)
SODIUM SERPL-SCNC: 143 MMOL/L (ref 135–147)
TRIGL SERPL-MCNC: 68 MG/DL (ref ?–150)
TSH SERPL DL<=0.05 MIU/L-ACNC: 2 UIU/ML (ref 0.45–4.5)
WBC # BLD AUTO: 4.46 THOUSAND/UL (ref 4.31–10.16)

## 2025-05-22 PROCEDURE — 82306 VITAMIN D 25 HYDROXY: CPT

## 2025-05-22 PROCEDURE — 80061 LIPID PANEL: CPT

## 2025-05-22 PROCEDURE — 36415 COLL VENOUS BLD VENIPUNCTURE: CPT

## 2025-05-22 PROCEDURE — 80053 COMPREHEN METABOLIC PANEL: CPT

## 2025-05-22 PROCEDURE — 85025 COMPLETE CBC W/AUTO DIFF WBC: CPT

## 2025-05-22 PROCEDURE — 84443 ASSAY THYROID STIM HORMONE: CPT

## 2025-05-27 ENCOUNTER — RESULTS FOLLOW-UP (OUTPATIENT)
Dept: FAMILY MEDICINE CLINIC | Facility: CLINIC | Age: 65
End: 2025-05-27

## (undated) DEVICE — DRAPE C-ARMOUR

## (undated) DEVICE — ABDOMINAL PAD: Brand: DERMACEA

## (undated) DEVICE — GLOVE SRG BIOGEL 6.5

## (undated) DEVICE — WEBRIL 6 IN UNSTERILE

## (undated) DEVICE — PADDING CAST 6IN COTTON STRL

## (undated) DEVICE — SPONGE LAP 18 X 18 IN STRL RFD

## (undated) DEVICE — GLOVE SRG BIOGEL ORTHOPEDIC 8.5

## (undated) DEVICE — CHLORAPREP HI-LITE 26ML ORANGE

## (undated) DEVICE — SYRINGE 20ML LL

## (undated) DEVICE — SUT VICRYL 0 CT-1 36 IN J946H

## (undated) DEVICE — SCD SEQUENTIAL COMPRESSION COMFORT SLEEVE MEDIUM KNEE LENGTH: Brand: KENDALL SCD

## (undated) DEVICE — PADDING CAST 4 IN  COTTON STRL

## (undated) DEVICE — ASTOUND STANDARD SURGICAL GOWN, XL: Brand: CONVERTORS

## (undated) DEVICE — SUT VICRYL 2-0 CT-2 27 IN J269H

## (undated) DEVICE — ACE WRAP 6 IN UNSTERILE

## (undated) DEVICE — PLUMEPEN PRO 10FT

## (undated) DEVICE — COBAN 6 IN STERILE

## (undated) DEVICE — MEDI-VAC YANKAUER SUCTION HANDLE W/BULBOUS AND CONTROL VENT: Brand: CARDINAL HEALTH

## (undated) DEVICE — GLOVE INDICATOR PI UNDERGLOVE SZ 8.5 BLUE

## (undated) DEVICE — IMPERVIOUS STOCKINETTE: Brand: DEROYAL

## (undated) DEVICE — 3M™ STERI-DRAPE™ U-DRAPE 1015: Brand: STERI-DRAPE™

## (undated) DEVICE — GLOVE INDICATOR PI UNDERGLOVE SZ 6.5 BLUE

## (undated) DEVICE — NEEDLE 18 G X 1 1/2

## (undated) DEVICE — PROXIMATE SKIN STAPLERS (35 WIDE) CONTAINS 35 STAINLESS STEEL STAPLES (FIXED HEAD): Brand: PROXIMATE

## (undated) DEVICE — BETHLEHEM UNIV TOTAL KNEE, KIT: Brand: CARDINAL HEALTH

## (undated) DEVICE — DRAPE C-ARM X-RAY

## (undated) DEVICE — 3M™ IOBAN™ 2 ANTIMICROBIAL INCISE DRAPE 6648EZ: Brand: IOBAN™ 2